# Patient Record
Sex: MALE | Race: BLACK OR AFRICAN AMERICAN | Employment: FULL TIME | URBAN - METROPOLITAN AREA
[De-identification: names, ages, dates, MRNs, and addresses within clinical notes are randomized per-mention and may not be internally consistent; named-entity substitution may affect disease eponyms.]

---

## 2017-06-14 ENCOUNTER — HOSPITAL ENCOUNTER (OUTPATIENT)
Dept: LAB | Age: 42
Discharge: HOME | End: 2017-06-14

## 2017-06-14 PROCEDURE — 99001 SPECIMEN HANDLING PT-LAB: CPT | Performed by: INTERNAL MEDICINE

## 2017-06-20 ENCOUNTER — OFFICE VISIT (OUTPATIENT)
Dept: INTERNAL MEDICINE CLINIC | Age: 42
End: 2017-06-20

## 2017-06-20 VITALS
HEIGHT: 76 IN | RESPIRATION RATE: 20 BRPM | BODY MASS INDEX: 35.92 KG/M2 | HEART RATE: 85 BPM | OXYGEN SATURATION: 97 % | DIASTOLIC BLOOD PRESSURE: 95 MMHG | TEMPERATURE: 98.7 F | WEIGHT: 295 LBS | SYSTOLIC BLOOD PRESSURE: 146 MMHG

## 2017-06-20 DIAGNOSIS — B36.0 TINEA VERSICOLOR: ICD-10-CM

## 2017-06-20 DIAGNOSIS — L21.9 SEBORRHEIC DERMATITIS: ICD-10-CM

## 2017-06-20 DIAGNOSIS — Z12.5 SCREENING PSA (PROSTATE SPECIFIC ANTIGEN): ICD-10-CM

## 2017-06-20 DIAGNOSIS — Z30.09 VASECTOMY EVALUATION: ICD-10-CM

## 2017-06-20 DIAGNOSIS — I10 ESSENTIAL HYPERTENSION: Primary | ICD-10-CM

## 2017-06-20 DIAGNOSIS — E66.09 NON MORBID OBESITY DUE TO EXCESS CALORIES: ICD-10-CM

## 2017-06-20 RX ORDER — KETOCONAZOLE 20 MG/ML
SHAMPOO TOPICAL
Qty: 120 ML | Refills: 2 | Status: SHIPPED | OUTPATIENT
Start: 2017-06-20 | End: 2020-11-04 | Stop reason: SDUPTHER

## 2017-06-20 RX ORDER — TRIAMCINOLONE ACETONIDE 1 MG/G
CREAM TOPICAL 2 TIMES DAILY
Qty: 30 G | Refills: 2 | Status: SHIPPED | OUTPATIENT
Start: 2017-06-20

## 2017-06-20 RX ORDER — PHENTERMINE HYDROCHLORIDE 37.5 MG/1
37.5 TABLET ORAL
Qty: 30 TAB | Refills: 0 | Status: SHIPPED | OUTPATIENT
Start: 2017-06-20 | End: 2017-12-20 | Stop reason: SDUPTHER

## 2017-06-20 NOTE — PATIENT INSTRUCTIONS
Advance Care Planning: Care Instructions  Your Care Instructions  It can be hard to live with an illness that cannot be cured. But if your health is getting worse, you may want to make decisions about end-of-life care. Planning for the end of your life does not mean that you are giving up. It is a way to make sure that your wishes are met. Clearly stating your wishes can make it easier for your loved ones. Making plans while you are still able may also ease your mind and make your final days less stressful and more meaningful. Follow-up care is a key part of your treatment and safety. Be sure to make and go to all appointments, and call your doctor if you are having problems. It's also a good idea to know your test results and keep a list of the medicines you take. What can you do to plan for the end of life? · You can bring these issues up with your doctor. You do not need to wait until your doctor starts the conversation. You might start with \"I would not be willing to live with . Roshni Hart \" When you complete this sentence it helps your doctor understand your wishes. · Talk openly and honestly with your doctor. This is the best way to understand the decisions you will need to make as your health changes. Know that you can always change your mind. · Ask your doctor about commonly used life-support measures. These include tube feedings, breathing machines, and fluids given through a vein (IV). Understanding these treatments will help you decide whether you want them. · You may choose to have these life-supporting treatments for a limited time. This allows a trial period to see whether they will help you. You may also decide that you want your doctor to take only certain measures to keep you alive. It is important to spell out these conditions so that your doctor and family understand them. · Talk to your doctor about how long you are likely to live.  He or she may be able to give you an idea of what usually happens with your specific illness. · Think about preparing papers that state your wishes. This way there will not be any confusion about what you want. You can change your instructions at any time. Which papers should you prepare? Advance directives are legal papers that tell doctors how you want to be cared for at the end of your life. You do not need a  to write these papers. Ask your doctor or your state health department for information on how to write your advance directives. They may have the forms for each of these types of papers. Make sure your doctor has a copy of these on file, and give a copy to a family member or close friend. · Consider a do-not-resuscitate order (DNR). This order asks that no extra treatments be done if your heart stops or you stop breathing. Extra treatments may include cardiopulmonary resuscitation (CPR), electrical shock to restart your heart, or a machine to breathe for you. If you decide to have a DNR order, ask your doctor to explain and write it. Place the order in your home where everyone can easily see it. · Consider a living will. A living will explains your wishes about life support and other treatments at the end of your life if you become unable to speak for yourself. Living garcia tell doctors to use or not use treatments that would keep you alive. You must have one or two witnesses or a notary present when you sign this form. · Consider a durable power of  for health care. This allows you to name a person to make decisions about your care if you are not able to. Most people ask a close friend or family member. Talk to this person about the kinds of treatments you want and those that you do not want. Make sure this person understands your wishes. These legal papers are simple to change. Tell your doctor what you want to change, and ask him or her to make a note in your medical file. Give your family updated copies of the papers.   Where can you learn more?  Go to http://omar-ricci.info/. Enter P184 in the search box to learn more about \"Advance Care Planning: Care Instructions. \"  Current as of: November 17, 2016  Content Version: 11.3  © 6474-9859 [employer]. Care instructions adapted under license by [employer] (which disclaims liability or warranty for this information). If you have questions about a medical condition or this instruction, always ask your healthcare professional. [address] any warranty or liability for your use of this information.

## 2017-06-20 NOTE — PROGRESS NOTES
Patient is in the office today for a referral request.  Patient would like a referral to a Urology for a Vasectomy. 1. Have you been to the ER, urgent care clinic since your last visit? Hospitalized since your last visit? No    2. Have you seen or consulted any other health care providers outside of the [address] [place] [address] since your last visit? Include any pap smears or colon screening.  No

## 2017-06-20 NOTE — MR AVS SNAPSHOT
Visit Information Date & Time Provider Department Dept. Phone Encounter #  
 6/20/2017  9:15 AM Lajean Halsted, MD [facility] [phone] Follow-up Instructions Return in about 6 months (around 12/20/2017) for labs 1 week before. Your Appointments 6/21/2017  8:30 AM  
ROUTINE CARE with Lajean Halsted, MD  
[facility] (--) Appt Note: 6 mo f/u  
 [address] [place] [address] [phone]  
  
   
 [address] [id number] Upcoming Health Maintenance Date Due DTaP/Tdap/Td series (1 - Tdap) 7/2/1994 INFLUENZA AGE 9 TO ADULT 8/1/2017 Allergies as of 6/20/2017  Review Complete On: 6/20/2017 By: Lina Arizmendi LPN No Known Allergies Current Immunizations  Reviewed on 12/28/2016 Name Date Influenza Vaccine 10/31/2016, 10/28/2015 Not reviewed this visit You Were Diagnosed With   
  
 Codes Comments Essential hypertension    -  Primary ICD-10-CM: I10 
ICD-9-CM: 401.9 Non morbid obesity due to excess calories     ICD-10-CM: E66.09 
ICD-9-CM: 278.00 Tinea versicolor     ICD-10-CM: B36.0 ICD-9-CM: 111.0 Seborrheic dermatitis     ICD-10-CM: L21.9 ICD-9-CM: 690.10 Vasectomy evaluation     ICD-10-CM: Z30.09 
ICD-9-CM: V25.09 Vitals BP Pulse Temp Resp Height(growth percentile) Weight(growth percentile) (!) 146/95 (BP 1 Location: Left arm, BP Patient Position: Sitting) 85 98.7 °F (37.1 °C) (Oral) 20 6' 3.5\" (1.918 m) 295 lb (133.8 kg) SpO2 BMI Smoking Status 97% 36.39 kg/m2 Never Smoker Vitals History BMI and BSA Data Body Mass Index Body Surface Area  
 36.39 kg/m 2 2.67 m 2 Preferred Pharmacy Pharmacy Name Phone [facility] [place] Villegas, [address] [facility] [phone] Your Updated Medication List  
  
   
 This list is accurate as of: 6/20/17  9:36 AM.  Always use your most recent med list. amLODIPine-benazepril 10-20 mg per capsule Commonly known as:  LOTREL  
take 1 capsule by mouth once daily FLUARIX QUAD [id number] (PF) Syrg injection Generic drug:  influenza vaccine 2016-17 (36mos+)(PF)  
inject 0.5 milliliter intramuscularly  
  
 ibuprofen 800 mg tablet Commonly known as:  MOTRIN Take 1 Tab by mouth every eight (8) hours as needed for Pain.  
  
 ketoconazole 2 % shampoo Commonly known as:  NIZORAL Apply quarter size daily as needed  
  
 phentermine 37.5 mg tablet Commonly known as:  ADIPEX-P Take 1 Tab by mouth every morning. Max Daily Amount: 37.5 mg.  
  
 triamcinolone acetonide 0.1 % topical cream  
Commonly known as:  KENALOG Apply  to affected area two (2) times a day. Apply dime size to affected area daily for 1 week as needed Prescriptions Printed Refills  
 phentermine (ADIPEX-P) 37.5 mg tablet 0 Sig: Take 1 Tab by mouth every morning. Max Daily Amount: 37.5 mg.  
 Class: Print Route: Oral  
 ketoconazole (NIZORAL) 2 % shampoo 2 Sig: Apply quarter size daily as needed Class: Print  
 triamcinolone acetonide (KENALOG) 0.1 % topical cream 2 Sig: Apply  to affected area two (2) times a day. Apply dime size to affected area daily for 1 week as needed Class: Print Route: Topical  
  
We Performed the Following REFERRAL TO UROLOGY [NQR902 Custom] Comments:  
 Refer to Dr Helene Treviño for vasectomy Follow-up Instructions Return in about 6 months (around 12/20/2017) for labs 1 week before. Referral Information Referral ID Referred By Referred To  
  
 [id number], [address] [facility] [address] [place] [address], [address] Phone: [phone] Fax: [phone] Visits Status Start Date End Date 1 New Request 6/20/17 6/20/18 If your referral has a status of pending review or denied, additional information will be sent to support the outcome of this decision. Patient Instructions Advance Care Planning: Care Instructions Your Care Instructions It can be hard to live with an illness that cannot be cured. But if your health is getting worse, you may want to make decisions about end-of-life care. Planning for the end of your life does not mean that you are giving up. It is a way to make sure that your wishes are met. Clearly stating your wishes can make it easier for your loved ones. Making plans while you are still able may also ease your mind and make your final days less stressful and more meaningful. Follow-up care is a key part of your treatment and safety. Be sure to make and go to all appointments, and call your doctor if you are having problems. It's also a good idea to know your test results and keep a list of the medicines you take. What can you do to plan for the end of life? · You can bring these issues up with your doctor. You do not need to wait until your doctor starts the conversation. You might start with \"I would not be willing to live with . Jia Rodriguez \" When you complete this sentence it helps your doctor understand your wishes. · Talk openly and honestly with your doctor. This is the best way to understand the decisions you will need to make as your health changes. Know that you can always change your mind. · Ask your doctor about commonly used life-support measures. These include tube feedings, breathing machines, and fluids given through a vein (IV). Understanding these treatments will help you decide whether you want them. · You may choose to have these life-supporting treatments for a limited time. This allows a trial period to see whether they will help you.  You may also decide that you want your doctor to take only certain measures to keep you alive. It is important to spell out these conditions so that your doctor and family understand them. · Talk to your doctor about how long you are likely to live. He or she may be able to give you an idea of what usually happens with your specific illness. · Think about preparing papers that state your wishes. This way there will not be any confusion about what you want. You can change your instructions at any time. Which papers should you prepare? Advance directives are legal papers that tell doctors how you want to be cared for at the end of your life. You do not need a  to write these papers. Ask your doctor or your [facility] department for information on how to write your advance directives. They may have the forms for each of these types of papers. Make sure your doctor has a copy of these on file, and give a copy to a family member or close friend. · Consider a do-not-resuscitate order (DNR). This order asks that no extra treatments be done if your heart stops or you stop breathing. Extra treatments may include cardiopulmonary resuscitation (CPR), electrical shock to restart your heart, or a machine to breathe for you. If you decide to have a DNR order, ask your doctor to explain and write it. Place the order in your home where everyone can easily see it. · Consider a living will. A living will explains your wishes about life support and other treatments at the end of your life if you become unable to speak for yourself. Living garcia tell doctors to use or not use treatments that would keep you alive. You must have one or two witnesses or a notary present when you sign this form. · Consider a durable power of  for health care. This allows you to name a person to make decisions about your care if you are not able to. Most people ask a close friend or family member. Talk to this person about the kinds of treatments you want and those that you do not want.  Make sure this person understands your wishes. These legal papers are simple to change. Tell your doctor what you want to change, and ask him or her to make a note in your medical file. Give your family updated copies of the papers. Where can you learn more? Go to http://omar-ricci.info/. Enter P184 in the search box to learn more about \"Advance Care Planning: Care Instructions. \" Current as of: November 17, 2016 Content Version: 11.3 © 7331-7969 [employer]. Care instructions adapted under license by [employer] (which disclaims liability or warranty for this information). If you have questions about a medical condition or this instruction, always ask your healthcare professional. [address] any warranty or liability for your use of this information. Introducing [facility]! Stephy So introduces [employer] patient portal. Now you can access parts of your medical record, email your doctor's office, and request medication refills online. 1. In your internet browser, go to [email or web address] 2. Click on the First Time User? Click Here link in the Sign In box. You will see the New Member Sign Up page. 3. Enter your [employer] Access Code exactly as it appears below. You will not need to use this code after youve completed the sign-up process. If you do not sign up before the expiration date, you must request a new code. · [employer] Access Code: [email or web address] Expires: 9/18/2017  9:23 AM 
 
4. Enter the last four digits of your Social Security Number (xxxx) and Date of Birth (mm/dd/yyyy) as indicated and click Submit. You will be taken to the next sign-up page. 5. Create a [employer] ID. This will be your [employer] login ID and cannot be changed, so think of one that is secure and easy to remember. 6. Create a [employer] password. You can change your password at any time. 7. Enter your Password Reset Question and Answer. This can be used at a later time if you forget your password. 8. Enter your e-mail address. You will receive e-mail notification when new information is available in [address] 9. Click Sign Up. You can now view and download portions of your medical record. 10. Click the Download Summary menu link to download a portable copy of your medical information. If you have questions, please visit the Frequently Asked Questions section of the [employer] website. Remember, [employer] is NOT to be used for urgent needs. For medical emergencies, dial 911. Now available from your iPhone and Android! Please provide this summary of care documentation to your next provider. Your primary care clinician is listed as KRISTIN ALBA. If you have any questions after today's visit, please call [phone].

## 2017-06-20 NOTE — PROGRESS NOTES
Sandy Elmore is a 35 y.o.  male and presents with Other (Referral to Urology for vasectomy); Hypertension (misses doses ); and Obesity      SUBJECTIVE:    Cardiovascular Review:  The patient has hypertension and obesity. Diet and Lifestyle: generally follows a low fat low cholesterol diet, exercises sporadically  Home BP Monitoring: is not measured at home. Pertinent ROS: not taking medications as instructed, no medication side effects noted, no TIA's, no chest pain on exertion, no dyspnea on exertion, noting swelling of ankles. Pt continues to struggle with losing weight. He has used Adipex in the past but he has not been able to keep the weight off. Contrave did not work for him. Pt would like to try Adipex again with diet and exercise. Respiratory ROS: negative for - shortness of breath  Cardiovascular ROS: negative for - chest pain    Current Outpatient Prescriptions   Medication Sig    phentermine (ADIPEX-P) 37.5 mg tablet Take 1 Tab by mouth every morning. Max Daily Amount: 37.5 mg.    ketoconazole (NIZORAL) 2 % shampoo Apply quarter size daily as needed    triamcinolone acetonide (KENALOG) 0.1 % topical cream Apply  to affected area two (2) times a day. Apply dime size to affected area daily for 1 week as needed    amLODIPine-benazepril (LOTREL) 10-20 mg per capsule take 1 capsule by mouth once daily    FLUARIX QUAD [id number], PF, syrg injection inject 0.5 milliliter intramuscularly    ibuprofen (MOTRIN) 800 mg tablet Take 1 Tab by mouth every eight (8) hours as needed for Pain. No current facility-administered medications for this visit.           OBJECTIVE:  alert, well appearing, and in no distress  Visit Vitals    BP (!) 146/95 (BP 1 Location: Left arm, BP Patient Position: Sitting)    Pulse 85    Temp 98.7 °F (37.1 °C) (Oral)    Resp 20    Ht 6' 3.5\" (1.918 m)    Wt 295 lb (133.8 kg)    SpO2 97%    BMI 36.39 kg/m2      well developed and well nourished  Chest - clear to auscultation, no wheezes, rales or rhonchi, symmetric air entry  Heart - normal rate, regular rhythm, normal S1, S2, no murmurs, rubs, clicks or gallops  Extremities - no edema     Labs: ordered     Discussed the patient's BMI with him. The BMI follow up plan is as follows: BMI is out of normal parameters and plan is as follows: I have counseled this patient on diet and exercise regimens. Assessment/Plan      ICD-10-CM ICD-9-CM    1. Essential hypertension I10 401.9 Uncontrolled due to poor compliance. Pt will try to be more compliant with Lotrel METABOLIC PANEL, COMPREHENSIVE   2. Non morbid obesity due to excess calories E66.09 278.00 Will continue phentermine (ADIPEX-P) 37.5 mg tablet along with diet and exercise. 3. Tinea versicolor B36.0 111.0 ketoconazole (NIZORAL) 2 % shampoo   4. Seborrheic dermatitis L21.9 690.10 triamcinolone acetonide (KENALOG) 0.1 % topical cream   5. Vasectomy evaluation Z30.09 V25.09 REFERRAL TO UROLOGY   6. Screening PSA (prostate specific antigen) Z12.5 V76.44 PROSTATE SPECIFIC AG (PSA)       Follow-up Disposition:  Return in about 6 months (around 12/20/2017) for labs 1 week before. Reviewed plan of care. Patient has provided input and agrees with goals.

## 2017-10-08 RX ORDER — AMLODIPINE AND BENAZEPRIL HYDROCHLORIDE 10; 20 MG/1; MG/1
CAPSULE ORAL
Qty: 30 CAP | Refills: 5 | Status: SHIPPED | OUTPATIENT
Start: 2017-10-08 | End: 2018-03-09 | Stop reason: SDUPTHER

## 2017-10-12 ENCOUNTER — TELEPHONE (OUTPATIENT)
Dept: FAMILY MEDICINE CLINIC | Age: 42
End: 2017-10-12
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
## 2017-12-15 ENCOUNTER — HOSPITAL ENCOUNTER (OUTPATIENT)
Dept: LAB | Age: 42
Discharge: HOME | End: 2017-12-15

## 2017-12-15 PROCEDURE — 99001 SPECIMEN HANDLING PT-LAB: CPT | Performed by: INTERNAL MEDICINE

## 2017-12-16 LAB
ALBUMIN SERPL-MCNC: 4.2 G/DL (ref 3.5–5.5)
ALBUMIN/GLOB SERPL: 1.4 {RATIO} (ref 1.2–2.2)
ALP SERPL-CCNC: 57 IU/L (ref 39–117)
ALT SERPL-CCNC: 27 IU/L (ref 0–44)
AST SERPL-CCNC: 17 IU/L (ref 0–40)
BILIRUB SERPL-MCNC: 0.4 MG/DL (ref 0–1.2)
BUN SERPL-MCNC: 17 MG/DL (ref 6–24)
BUN/CREAT SERPL: 18 (ref 9–20)
CALCIUM SERPL-MCNC: 9.3 MG/DL (ref 8.7–10.2)
CHLORIDE SERPL-SCNC: 104 MMOL/L (ref 96–106)
CO2 SERPL-SCNC: 23 MMOL/L (ref 18–29)
CREAT SERPL-MCNC: 0.97 MG/DL (ref 0.76–1.27)
GFR SERPLBLD CREATININE-BSD FMLA CKD-EPI: 109 ML/MIN/1.73
GFR SERPLBLD CREATININE-BSD FMLA CKD-EPI: 95 ML/MIN/1.73
GLOBULIN SER CALC-MCNC: 3 G/DL (ref 1.5–4.5)
GLUCOSE SERPL-MCNC: 91 MG/DL (ref 65–99)
POTASSIUM SERPL-SCNC: 4.4 MMOL/L (ref 3.5–5.2)
PROT SERPL-MCNC: 7.2 G/DL (ref 6–8.5)
PSA SERPL-MCNC: 0.6 NG/ML (ref 0–4)
SODIUM SERPL-SCNC: 143 MMOL/L (ref 134–144)

## 2017-12-18 DIAGNOSIS — I10 ESSENTIAL HYPERTENSION: ICD-10-CM

## 2017-12-20 ENCOUNTER — OFFICE VISIT (OUTPATIENT)
Dept: FAMILY MEDICINE CLINIC | Age: 42
End: 2017-12-20

## 2017-12-20 VITALS
SYSTOLIC BLOOD PRESSURE: 160 MMHG | BODY MASS INDEX: 35.56 KG/M2 | HEIGHT: 76 IN | OXYGEN SATURATION: 96 % | TEMPERATURE: 98.6 F | WEIGHT: 292 LBS | DIASTOLIC BLOOD PRESSURE: 89 MMHG | RESPIRATION RATE: 18 BRPM | HEART RATE: 91 BPM

## 2017-12-20 DIAGNOSIS — I10 ESSENTIAL HYPERTENSION: Primary | ICD-10-CM

## 2017-12-20 DIAGNOSIS — E66.09 NON MORBID OBESITY DUE TO EXCESS CALORIES: ICD-10-CM

## 2017-12-20 RX ORDER — PHENTERMINE HYDROCHLORIDE 37.5 MG/1
37.5 TABLET ORAL
Qty: 30 TAB | Refills: 0 | Status: SHIPPED | OUTPATIENT
Start: 2017-12-20 | End: 2018-06-01 | Stop reason: SDUPTHER

## 2017-12-20 NOTE — PROGRESS NOTES
Camille Orozco is a 38 y.o.  male and presents with Hypertension and Obesity      SUBJECTIVE:    Cardiovascular Review:  The patient has hypertension and obesity. Diet and Lifestyle: generally follows a low fat low cholesterol diet, exercises sporadically  Home BP Monitoring: is borderline at home. Pt will monitor on regular basis to see if it is controlled at home with him being compliant with his Lotrel     Pertinent ROS: taking medications as instructed, no medication side effects noted, no TIA's, no chest pain on exertion, no dyspnea on exertion, noting swelling of ankles. Pt continues to struggle with losing weight. He has used Adipex in the past but he has not been able to keep the weight off. Contrave did not work for him. Respiratory ROS: negative for - shortness of breath  Cardiovascular ROS: negative for - chest pain    Current Outpatient Prescriptions   Medication Sig    phentermine (ADIPEX-P) 37.5 mg tablet Take 1 Tab by mouth every morning. Max Daily Amount: 37.5 mg.    amLODIPine-benazepril (LOTREL) 10-20 mg per capsule take 1 capsule by mouth once daily    amLODIPine-benazepril (LOTREL) 10-20 mg per capsule     diazePAM (VALIUM) 2 mg tablet Take 2.5 Tabs by mouth every six (6) hours as needed for Anxiety. Max Daily Amount: 20 mg.    oxyCODONE-acetaminophen (PERCOCET) 5-325 mg per tablet Take 1 Tab by mouth every four (4) hours as needed for Pain. Max Daily Amount: 6 Tabs.  ketoconazole (NIZORAL) 2 % shampoo Apply quarter size daily as needed    triamcinolone acetonide (KENALOG) 0.1 % topical cream Apply  to affected area two (2) times a day. Apply dime size to affected area daily for 1 week as needed    FLUARIX QUAD [id number], PF, syrg injection inject 0.5 milliliter intramuscularly    ibuprofen (MOTRIN) 800 mg tablet Take 1 Tab by mouth every eight (8) hours as needed for Pain. No current facility-administered medications for this visit.           OBJECTIVE:  alert, well appearing, and in no distress  Visit Vitals    /89 (BP 1 Location: Left arm, BP Patient Position: Sitting)    Pulse 91    Temp 98.6 °F (37 °C) (Oral)    Resp 18    Ht 6' 4\" (1.93 m)    Wt 292 lb (132.5 kg)    SpO2 96%    BMI 35.54 kg/m2      well developed and well nourished  Chest - clear to auscultation, no wheezes, rales or rhonchi, symmetric air entry  Heart - normal rate, regular rhythm, normal S1, S2, no murmurs, rubs, clicks or gallops  Musculoskeletal - abnormal exam of left lateral ankle with pinpoint tenderness and swelling as well as worsening pain with eversion of the left foot   Extremities - no edema     Labs:   Lab Results   Component Value Date/Time    Cholesterol, total 194 06/14/2017 08:27 AM    HDL Cholesterol 45 06/14/2017 08:27 AM    LDL, calculated 123 06/14/2017 08:27 AM    Triglyceride 130 06/14/2017 08:27 AM     Lab Results   Component Value Date/Time    Prostate Specific Ag 0.6 12/15/2017 08:24 AM    Prostate Specific Ag 0.6 07/07/2016 09:23 AM    Prostate Specific Ag 0.5 12/31/2015 10:16 AM     Lab Results   Component Value Date/Time    Sodium 143 12/15/2017 08:24 AM    Potassium 4.4 12/15/2017 08:24 AM    Chloride 104 12/15/2017 08:24 AM    CO2 23 12/15/2017 08:24 AM    Glucose 91 12/15/2017 08:24 AM    BUN 17 12/15/2017 08:24 AM    Creatinine 0.97 12/15/2017 08:24 AM    BUN/Creatinine ratio 18 12/15/2017 08:24 AM    GFR est  12/15/2017 08:24 AM    GFR est non-AA 95 12/15/2017 08:24 AM    Calcium 9.3 12/15/2017 08:24 AM    Bilirubin, total 0.4 12/15/2017 08:24 AM    ALT (SGPT) 27 12/15/2017 08:24 AM    AST (SGOT) 17 12/15/2017 08:24 AM    Alk. phosphatase 57 12/15/2017 08:24 AM    Protein, total 7.2 12/15/2017 08:24 AM    Albumin 4.2 12/15/2017 08:24 AM    A-G Ratio 1.4 12/15/2017 08:24 AM          Discussed the patient's BMI with him.   The BMI follow up plan is as follows: BMI is out of normal parameters and plan is as follows: I have counseled this patient on diet and exercise regimens. Assessment/Plan      ICD-10-CM ICD-9-CM    1. Essential hypertension I10 401.9 Borderline controlled in this office. pt will monitor at home and be compliant with Lotrel METABOLIC PANEL, COMPREHENSIVE   2. Non morbid obesity due to excess calories E66.09 278.00 Pt will try to exercise and watch diet along with phentermine (ADIPEX-P) 37.5 mg tablet       Follow-up Disposition:  Return in about 6 months (around 6/20/2018) for labs 1 week before. Reviewed plan of care. Patient has provided input and agrees with goals.

## 2017-12-20 NOTE — PATIENT INSTRUCTIONS
High Blood Pressure: Care Instructions  Your Care Instructions    If your blood pressure is usually above 140/90, you have high blood pressure, or hypertension. That means the top number is 140 or higher or the bottom number is 90 or higher, or both. Despite what a lot of people think, high blood pressure usually doesn't cause headaches or make you feel dizzy or lightheaded. It usually has no symptoms. But it does increase your risk for heart attack, stroke, and kidney or eye damage. The higher your blood pressure, the more your risk increases. Your doctor will give you a goal for your blood pressure. Your goal will be based on your health and your age. An example of a goal is to keep your blood pressure below 140/90. Lifestyle changes, such as eating healthy and being active, are always important to help lower blood pressure. You might also take medicine to reach your blood pressure goal.  Follow-up care is a key part of your treatment and safety. Be sure to make and go to all appointments, and call your doctor if you are having problems. It's also a good idea to know your test results and keep a list of the medicines you take. How can you care for yourself at home? Medical treatment  · If you stop taking your medicine, your blood pressure will go back up. You may take one or more types of medicine to lower your blood pressure. Be safe with medicines. Take your medicine exactly as prescribed. Call your doctor if you think you are having a problem with your medicine. · Talk to your doctor before you start taking aspirin every day. Aspirin can help certain people lower their risk of a heart attack or stroke. But taking aspirin isn't right for everyone, because it can cause serious bleeding. · See your doctor regularly. You may need to see the doctor more often at first or until your blood pressure comes down.   · If you are taking blood pressure medicine, talk to your doctor before you take decongestants or anti-inflammatory medicine, such as ibuprofen. Some of these medicines can raise blood pressure. · Learn how to check your blood pressure at home. Lifestyle changes  · Stay at a healthy weight. This is especially important if you put on weight around the waist. Losing even 10 pounds can help you lower your blood pressure. · If your doctor recommends it, get more exercise. Walking is a good choice. Bit by bit, increase the amount you walk every day. Try for at least 30 minutes on most days of the week. You also may want to swim, bike, or do other activities. · Avoid or limit alcohol. Talk to your doctor about whether you can drink any alcohol. · Try to limit how much sodium you eat to less than 2,300 milligrams (mg) a day. Your doctor may ask you to try to eat less than 1,500 mg a day. · Eat plenty of fruits (such as bananas and oranges), vegetables, legumes, whole grains, and low-fat dairy products. · Lower the amount of saturated fat in your diet. Saturated fat is found in animal products such as milk, cheese, and meat. Limiting these foods may help you lose weight and also lower your risk for heart disease. · Do not smoke. Smoking increases your risk for heart attack and stroke. If you need help quitting, talk to your doctor about stop-smoking programs and medicines. These can increase your chances of quitting for good. When should you call for help? Call 911 anytime you think you may need emergency care. This may mean having symptoms that suggest that your blood pressure is causing a serious heart or blood vessel problem. Your blood pressure may be over 180/110. ? For example, call 911 if:  ? · You have symptoms of a heart attack. These may include:  ¨ Chest pain or pressure, or a strange feeling in the chest.  ¨ Sweating. ¨ Shortness of breath. ¨ Nausea or vomiting.   ¨ Pain, pressure, or a strange feeling in the back, neck, jaw, or upper belly or in one or both shoulders or arms.  ¨ Lightheadedness or sudden weakness. ¨ A fast or irregular heartbeat. ? · You have symptoms of a stroke. These may include:  ¨ Sudden numbness, tingling, weakness, or loss of movement in your face, arm, or leg, especially on only one side of your body. ¨ Sudden vision changes. ¨ Sudden trouble speaking. ¨ Sudden confusion or trouble understanding simple statements. ¨ Sudden problems with walking or balance. ¨ A sudden, severe headache that is different from past headaches. ? · You have severe back or belly pain. ?Do not wait until your blood pressure comes down on its own. Get help right away. ?Call your doctor now or seek immediate care if:  ? · Your blood pressure is much higher than normal (such as 180/110 or higher), but you don't have symptoms. ? · You think high blood pressure is causing symptoms, such as:  ¨ Severe headache. ¨ Blurry vision. ? Watch closely for changes in your health, and be sure to contact your doctor if:  ? · Your blood pressure measures 140/90 or higher at least 2 times. That means the top number is 140 or higher or the bottom number is 90 or higher, or both. ? · You think you may be having side effects from your blood pressure medicine. ? · Your blood pressure is usually normal, but it goes above normal at least 2 times. Where can you learn more? Go to http://omar-ricci.info/. Enter C656 in the search box to learn more about \"High Blood Pressure: Care Instructions. \"  Current as of: September 21, 2016  Content Version: 11.4  © 4579-0646 [employer]. Care instructions adapted under license by [employer] (which disclaims liability or warranty for this information). If you have questions about a medical condition or this instruction, always ask your healthcare professional. [place] [address] any warranty or liability for your use of this information.        DASH Diet: Care Instructions  Your Care Instructions    The DASH diet is an eating plan that can help lower your blood pressure. DASH stands for Dietary Approaches to Stop Hypertension. Hypertension is high blood pressure. The DASH diet focuses on eating foods that are high in calcium, potassium, and magnesium. These nutrients can lower blood pressure. The foods that are highest in these nutrients are fruits, vegetables, low-fat dairy products, nuts, seeds, and legumes. But taking calcium, potassium, and magnesium supplements instead of eating foods that are high in those nutrients does not have the same effect. The DASH diet also includes whole grains, fish, and poultry. The DASH diet is one of several lifestyle changes your doctor may recommend to lower your high blood pressure. Your doctor may also want you to decrease the amount of sodium in your diet. Lowering sodium while following the DASH diet can lower blood pressure even further than just the DASH diet alone. Follow-up care is a key part of your treatment and safety. Be sure to make and go to all appointments, and call your doctor if you are having problems. It's also a good idea to know your test results and keep a list of the medicines you take. How can you care for yourself at home? Following the DASH diet  · Eat 4 to 5 servings of fruit each day. A serving is 1 medium-sized piece of fruit, ½ cup chopped or canned fruit, 1/4 cup dried fruit, or 4 ounces (½ cup) of fruit juice. Choose fruit more often than fruit juice. · Eat 4 to 5 servings of vegetables each day. A serving is 1 cup of lettuce or raw leafy vegetables, ½ cup of chopped or cooked vegetables, or 4 ounces (½ cup) of vegetable juice. Choose vegetables more often than vegetable juice. · Get 2 to 3 servings of low-fat and fat-free dairy each day. A serving is 8 ounces of milk, 1 cup of yogurt, or 1 ½ ounces of cheese. · Eat 6 to 8 servings of grains each day.  A serving is 1 slice of bread, 1 ounce of dry cereal, or ½ cup of cooked rice, pasta, or cooked cereal. Try to choose whole-grain products as much as possible. · Limit lean meat, poultry, and fish to 2 servings each day. A serving is 3 ounces, about the size of a deck of cards. · Eat 4 to 5 servings of nuts, seeds, and legumes (cooked dried beans, lentils, and split peas) each week. A serving is 1/3 cup of nuts, 2 tablespoons of seeds, or ½ cup of cooked beans or peas. · Limit fats and oils to 2 to 3 servings each day. A serving is 1 teaspoon of vegetable oil or 2 tablespoons of salad dressing. · Limit sweets and added sugars to 5 servings or less a week. A serving is 1 tablespoon jelly or jam, ½ cup sorbet, or 1 cup of lemonade. · Eat less than 2,300 milligrams (mg) of sodium a day. If you limit your sodium to 1,500 mg a day, you can lower your blood pressure even more. Tips for success  · Start small. Do not try to make dramatic changes to your diet all at once. You might feel that you are missing out on your favorite foods and then be more likely to not follow the plan. Make small changes, and stick with them. Once those changes become habit, add a few more changes. · Try some of the following:  ¨ Make it a goal to eat a fruit or vegetable at every meal and at snacks. This will make it easy to get the recommended amount of fruits and vegetables each day. ¨ Try yogurt topped with fruit and nuts for a snack or healthy dessert. ¨ Add lettuce, tomato, cucumber, and onion to sandwiches. ¨ Combine a ready-made pizza crust with low-fat mozzarella cheese and lots of vegetable toppings. Try using tomatoes, squash, spinach, broccoli, carrots, cauliflower, and onions. ¨ Have a variety of cut-up vegetables with a low-fat dip as an appetizer instead of chips and dip. ¨ Sprinkle sunflower seeds or chopped almonds over salads. Or try adding chopped walnuts or almonds to cooked vegetables. ¨ Try some vegetarian meals using beans and peas. Add garbanzo or kidney beans to salads. Make burritos and tacos with mashed carney beans or black beans. Where can you learn more? Go to http://omar-ricci.info/. Enter D336 in the search box to learn more about \"DASH Diet: Care Instructions. \"  Current as of: September 21, 2016  Content Version: 11.4  © 7572-8885 [employer]. Care instructions adapted under license by [employer] (which disclaims liability or warranty for this information). If you have questions about a medical condition or this instruction, always ask your healthcare professional. [place] [address] any warranty or liability for your use of this information.

## 2017-12-20 NOTE — PROGRESS NOTES
Joseph Vásquez is a  38 y.o. male presents today for office visit for routine follow up. 1. Have you been to the ER, urgent care clinic or hospitalized since your last visit? NO      2. Have you seen or consulted any other health care providers outside of the [address] [place] [address] since your last visit (Include any pap smears or colon screening)?  NO

## 2017-12-20 NOTE — MR AVS SNAPSHOT
Visit Information Date & Time Provider Department Dept. Phone Encounter #  
 12/20/2017  9:15 AM Corey Ashley, [address] [place] [address] [place] [address] [facility] [id number] Follow-up Instructions Return in about 6 months (around 6/20/2018) for labs 1 week before. Upcoming Health Maintenance Date Due DTaP/Tdap/Td series (1 - Tdap) 7/2/1994 Influenza Age 5 to Adult 8/1/2017 Allergies as of 12/20/2017  Review Complete On: 12/20/2017 By: Corey Ashley MD  
 No Known Allergies Current Immunizations  Reviewed on 12/28/2016 Name Date Influenza Vaccine 10/31/2016, 10/28/2015 Not reviewed this visit You Were Diagnosed With   
  
 Codes Comments Essential hypertension    -  Primary ICD-10-CM: I10 
ICD-9-CM: 401.9 Non morbid obesity due to excess calories     ICD-10-CM: E66.09 
ICD-9-CM: 278.00 Vitals BP Pulse Temp Resp Height(growth percentile) Weight(growth percentile) 160/89 (BP 1 Location: Left arm, BP Patient Position: Sitting) 91 98.6 °F (37 °C) (Oral) 18 6' 4\" (1.93 m) 292 lb (132.5 kg) SpO2 BMI Smoking Status 96% 35.54 kg/m2 Never Smoker BMI and BSA Data Body Mass Index Body Surface Area 35.54 kg/m 2 2.67 m 2 Preferred Pharmacy Pharmacy Name Phone [id number] [address] [phone] Your Updated Medication List  
  
   
This list is accurate as of: 12/20/17 10:11 AM.  Always use your most recent med list.  
  
  
  
  
 * amLODIPine-benazepril 10-20 mg per capsule Commonly known as:  Maria C Hausen * amLODIPine-benazepril 10-20 mg per capsule Commonly known as:  LOTREL  
take 1 capsule by mouth once daily  
  
 diazePAM 2 mg tablet Commonly known as:  VALIUM Take 2.5 Tabs by mouth every six (6) hours as needed for Anxiety. Max Daily Amount: 20 mg. FLUARIX QUAD [id number] (PF) Syrg injection Generic drug:  influenza vaccine 2016-17 (36mos+)(PF)  
inject 0.5 milliliter intramuscularly  
  
 ibuprofen 800 mg tablet Commonly known as:  MOTRIN Take 1 Tab by mouth every eight (8) hours as needed for Pain.  
  
 ketoconazole 2 % shampoo Commonly known as:  NIZORAL Apply quarter size daily as needed  
  
 oxyCODONE-acetaminophen 5-325 mg per tablet Commonly known as:  PERCOCET Take 1 Tab by mouth every four (4) hours as needed for Pain. Max Daily Amount: 6 Tabs. phentermine 37.5 mg tablet Commonly known as:  ADIPEX-P Take 1 Tab by mouth every morning. Max Daily Amount: 37.5 mg.  
  
 triamcinolone acetonide 0.1 % topical cream  
Commonly known as:  KENALOG Apply  to affected area two (2) times a day. Apply dime size to affected area daily for 1 week as needed * Notice: This list has 2 medication(s) that are the same as other medications prescribed for you. Read the directions carefully, and ask your doctor or other care provider to review them with you. Prescriptions Printed Refills  
 phentermine (ADIPEX-P) 37.5 mg tablet 0 Sig: Take 1 Tab by mouth every morning. Max Daily Amount: 37.5 mg.  
 Class: Print Route: Oral  
  
Follow-up Instructions Return in about 6 months (around 6/20/2018) for labs 1 week before. Patient Instructions High Blood Pressure: Care Instructions Your Care Instructions If your blood pressure is usually above 140/90, you have high blood pressure, or hypertension. That means the top number is 140 or higher or the bottom number is 90 or higher, or both. Despite what a lot of people think, high blood pressure usually doesn't cause headaches or make you feel dizzy or lightheaded. It usually has no symptoms. But it does increase your risk for heart attack, stroke, and kidney or eye damage. The higher your blood pressure, the more your risk increases. Your doctor will give you a goal for your blood pressure. Your goal will be based on your health and your age. An example of a goal is to keep your blood pressure below 140/90. Lifestyle changes, such as eating healthy and being active, are always important to help lower blood pressure. You might also take medicine to reach your blood pressure goal. 
Follow-up care is a key part of your treatment and safety. Be sure to make and go to all appointments, and call your doctor if you are having problems. It's also a good idea to know your test results and keep a list of the medicines you take. How can you care for yourself at home? Medical treatment · If you stop taking your medicine, your blood pressure will go back up. You may take one or more types of medicine to lower your blood pressure. Be safe with medicines. Take your medicine exactly as prescribed. Call your doctor if you think you are having a problem with your medicine. · Talk to your doctor before you start taking aspirin every day. Aspirin can help certain people lower their risk of a heart attack or stroke. But taking aspirin isn't right for everyone, because it can cause serious bleeding. · See your doctor regularly. You may need to see the doctor more often at first or until your blood pressure comes down. · If you are taking blood pressure medicine, talk to your doctor before you take decongestants or anti-inflammatory medicine, such as ibuprofen. Some of these medicines can raise blood pressure. · Learn how to check your blood pressure at home. Lifestyle changes · Stay at a healthy weight. This is especially important if you put on weight around the waist. Losing even 10 pounds can help you lower your blood pressure. · If your doctor recommends it, get more exercise. Walking is a good choice. Bit by bit, increase the amount you walk every day. Try for at least 30 minutes on most days of the week.  You also may want to swim, bike, or do other activities. · Avoid or limit alcohol. Talk to your doctor about whether you can drink any alcohol. · Try to limit how much sodium you eat to less than 2,300 milligrams (mg) a day. Your doctor may ask you to try to eat less than 1,500 mg a day. · Eat plenty of fruits (such as bananas and oranges), vegetables, legumes, whole grains, and low-fat dairy products. · Lower the amount of saturated fat in your diet. Saturated fat is found in animal products such as milk, cheese, and meat. Limiting these foods may help you lose weight and also lower your risk for heart disease. · Do not smoke. Smoking increases your risk for heart attack and stroke. If you need help quitting, talk to your doctor about stop-smoking programs and medicines. These can increase your chances of quitting for good. When should you call for help? Call 911 anytime you think you may need emergency care. This may mean having symptoms that suggest that your blood pressure is causing a serious heart or blood vessel problem. Your blood pressure may be over 180/110. ? For example, call 911 if: 
? · You have symptoms of a heart attack. These may include: ¨ Chest pain or pressure, or a strange feeling in the chest. 
¨ Sweating. ¨ Shortness of breath. ¨ Nausea or vomiting. ¨ Pain, pressure, or a strange feeling in the back, neck, jaw, or upper belly or in one or both shoulders or arms. ¨ Lightheadedness or sudden weakness. ¨ A fast or irregular heartbeat. ? · You have symptoms of a stroke. These may include: 
¨ Sudden numbness, tingling, weakness, or loss of movement in your face, arm, or leg, especially on only one side of your body. ¨ Sudden vision changes. ¨ Sudden trouble speaking. ¨ Sudden confusion or trouble understanding simple statements. ¨ Sudden problems with walking or balance. ¨ A sudden, severe headache that is different from past headaches. ? · You have severe back or belly pain. ?Do not wait until your blood pressure comes down on its own. Get help right away. ?Call your doctor now or seek immediate care if: 
? · Your blood pressure is much higher than normal (such as 180/110 or higher), but you don't have symptoms. ? · You think high blood pressure is causing symptoms, such as: ¨ Severe headache. ¨ Blurry vision. ? Watch closely for changes in your health, and be sure to contact your doctor if: 
? · Your blood pressure measures 140/90 or higher at least 2 times. That means the top number is 140 or higher or the bottom number is 90 or higher, or both. ? · You think you may be having side effects from your blood pressure medicine. ? · Your blood pressure is usually normal, but it goes above normal at least 2 times. Where can you learn more? Go to http://omar-ricci.info/. Enter X105 in the search box to learn more about \"High Blood Pressure: Care Instructions. \" Current as of: September 21, 2016 Content Version: 11.4 © 5210-1166 [employer]. Care instructions adapted under license by [employer] (which disclaims liability or warranty for this information). If you have questions about a medical condition or this instruction, always ask your healthcare professional. [address] any warranty or liability for your use of this information. DASH Diet: Care Instructions Your Care Instructions The DASH diet is an eating plan that can help lower your blood pressure. DASH stands for Dietary Approaches to Stop Hypertension. Hypertension is high blood pressure. The DASH diet focuses on eating foods that are high in calcium, potassium, and magnesium. These nutrients can lower blood pressure. The foods that are highest in these nutrients are fruits, vegetables, low-fat dairy products, nuts, seeds, and legumes.  But taking calcium, potassium, and magnesium supplements instead of eating foods that are high in those nutrients does not have the same effect. The DASH diet also includes whole grains, fish, and poultry. The DASH diet is one of several lifestyle changes your doctor may recommend to lower your high blood pressure. Your doctor may also want you to decrease the amount of sodium in your diet. Lowering sodium while following the DASH diet can lower blood pressure even further than just the DASH diet alone. Follow-up care is a key part of your treatment and safety. Be sure to make and go to all appointments, and call your doctor if you are having problems. It's also a good idea to know your test results and keep a list of the medicines you take. How can you care for yourself at home? Following the DASH diet · Eat 4 to 5 servings of fruit each day. A serving is 1 medium-sized piece of fruit, ½ cup chopped or canned fruit, 1/4 cup dried fruit, or 4 ounces (½ cup) of fruit juice. Choose fruit more often than fruit juice. · Eat 4 to 5 servings of vegetables each day. A serving is 1 cup of lettuce or raw leafy vegetables, ½ cup of chopped or cooked vegetables, or 4 ounces (½ cup) of vegetable juice. Choose vegetables more often than vegetable juice. · Get 2 to 3 servings of low-fat and fat-free dairy each day. A serving is 8 ounces of milk, 1 cup of yogurt, or 1 ½ ounces of cheese. · Eat 6 to 8 servings of grains each day. A serving is 1 slice of bread, 1 ounce of dry cereal, or ½ cup of cooked rice, pasta, or cooked cereal. Try to choose whole-grain products as much as possible. · Limit lean meat, poultry, and fish to 2 servings each day. A serving is 3 ounces, about the size of a deck of cards. · Eat 4 to 5 servings of nuts, seeds, and legumes (cooked dried beans, lentils, and split peas) each week. A serving is 1/3 cup of nuts, 2 tablespoons of seeds, or ½ cup of cooked beans or peas. · Limit fats and oils to 2 to 3 servings each day. A serving is 1 teaspoon of vegetable oil or 2 tablespoons of salad dressing. · Limit sweets and added sugars to 5 servings or less a week. A serving is 1 tablespoon jelly or jam, ½ cup sorbet, or 1 cup of lemonade. · Eat less than 2,300 milligrams (mg) of sodium a day. If you limit your sodium to 1,500 mg a day, you can lower your blood pressure even more. Tips for success · Start small. Do not try to make dramatic changes to your diet all at once. You might feel that you are missing out on your favorite foods and then be more likely to not follow the plan. Make small changes, and stick with them. Once those changes become habit, add a few more changes. · Try some of the following: ¨ Make it a goal to eat a fruit or vegetable at every meal and at snacks. This will make it easy to get the recommended amount of fruits and vegetables each day. ¨ Try yogurt topped with fruit and nuts for a snack or healthy dessert. ¨ Add lettuce, tomato, cucumber, and onion to sandwiches. ¨ Combine a ready-made pizza crust with low-fat mozzarella cheese and lots of vegetable toppings. Try using tomatoes, squash, spinach, broccoli, carrots, cauliflower, and onions. ¨ Have a variety of cut-up vegetables with a low-fat dip as an appetizer instead of chips and dip. ¨ Sprinkle sunflower seeds or chopped almonds over salads. Or try adding chopped walnuts or almonds to cooked vegetables. ¨ Try some vegetarian meals using beans and peas. Add garbanzo or kidney beans to salads. Make burritos and tacos with mashed carney beans or black beans. Where can you learn more? Go to http://omar-ricci.info/. Enter F374 in the search box to learn more about \"DASH Diet: Care Instructions. \" Current as of: September 21, 2016 Content Version: 11.4 © 9460-9657 [employer]. Care instructions adapted under license by [employer] (which disclaims liability or warranty for this information).  If you have questions about a medical condition or this instruction, always ask your healthcare professional. [place] [address] any warranty or liability for your use of this information. Introducing [facility]! [employer] introduces [employer] patient portal. Now you can access parts of your medical record, email your doctor's office, and request medication refills online. 1. In your internet browser, go to [email or web address]. 2. Click on the First Time User? Click Here link in the Sign In box. You will see the New Member Sign Up page. 3. Enter your [employer] Access Code exactly as it appears below. You will not need to use this code after youve completed the sign-up process. If you do not sign up before the expiration date, you must request a new code. · [employer] Access Code: [email or web address] Expires: 3/20/2018  9:16 AM 
 
4. Enter the last four digits of your Social Security Number (xxxx) and Date of Birth (mm/dd/yyyy) as indicated and click Submit. You will be taken to the next sign-up page. 5. Create a [employer] ID. This will be your [employer] login ID and cannot be changed, so think of one that is secure and easy to remember. 6. Create a [employer] password. You can change your password at any time. 7. Enter your Password Reset Question and Answer. This can be used at a later time if you forget your password. 8. Enter your e-mail address. You will receive e-mail notification when new information is available in [address] 9. Click Sign Up. You can now view and download portions of your medical record. 10. Click the Download Summary menu link to download a portable copy of your medical information. If you have questions, please visit the Frequently Asked Questions section of the [employer] website. Remember, [employer] is NOT to be used for urgent needs. For medical emergencies, dial 911. Now available from your iPhone and Android! Please provide this summary of care documentation to your next provider. Your primary care clinician is listed as KRISTIN ALBA. If you have any questions after today's visit, please call [phone].

## 2018-01-15 DIAGNOSIS — L21.9 SEBORRHEIC DERMATITIS: ICD-10-CM

## 2018-01-15 RX ORDER — TRIAMCINOLONE ACETONIDE 1 MG/G
CREAM TOPICAL
Qty: 30 G | Refills: 2 | Status: SHIPPED | OUTPATIENT
Start: 2018-01-15 | End: 2018-06-01 | Stop reason: SDUPTHER

## 2018-03-09 RX ORDER — AMLODIPINE AND BENAZEPRIL HYDROCHLORIDE 10; 20 MG/1; MG/1
CAPSULE ORAL
Qty: 30 CAP | Refills: 5 | Status: SHIPPED | OUTPATIENT
Start: 2018-03-09 | End: 2018-05-22 | Stop reason: SDUPTHER

## 2018-05-22 RX ORDER — AMLODIPINE AND BENAZEPRIL HYDROCHLORIDE 10; 20 MG/1; MG/1
CAPSULE ORAL
Qty: 90 CAP | Refills: 2 | Status: SHIPPED | OUTPATIENT
Start: 2018-05-22 | End: 2018-06-01 | Stop reason: ALTCHOICE

## 2018-05-31 ENCOUNTER — HOSPITAL ENCOUNTER (OUTPATIENT)
Dept: LAB | Age: 43
Discharge: HOME | End: 2018-05-31

## 2018-05-31 PROCEDURE — 99001 SPECIMEN HANDLING PT-LAB: CPT | Performed by: INTERNAL MEDICINE

## 2018-06-01 ENCOUNTER — OFFICE VISIT (OUTPATIENT)
Dept: FAMILY MEDICINE CLINIC | Age: 43
End: 2018-06-01

## 2018-06-01 VITALS
HEART RATE: 89 BPM | TEMPERATURE: 98.2 F | WEIGHT: 298 LBS | RESPIRATION RATE: 20 BRPM | HEIGHT: 76 IN | DIASTOLIC BLOOD PRESSURE: 97 MMHG | OXYGEN SATURATION: 95 % | SYSTOLIC BLOOD PRESSURE: 148 MMHG | BODY MASS INDEX: 36.29 KG/M2

## 2018-06-01 DIAGNOSIS — L21.9 SEBORRHEIC DERMATITIS: ICD-10-CM

## 2018-06-01 DIAGNOSIS — N50.89 TESTICLE SWELLING: ICD-10-CM

## 2018-06-01 DIAGNOSIS — Z12.5 SCREENING PSA (PROSTATE SPECIFIC ANTIGEN): ICD-10-CM

## 2018-06-01 DIAGNOSIS — I10 ESSENTIAL HYPERTENSION: Primary | ICD-10-CM

## 2018-06-01 DIAGNOSIS — E66.01 CLASS 2 SEVERE OBESITY DUE TO EXCESS CALORIES WITH SERIOUS COMORBIDITY AND BODY MASS INDEX (BMI) OF 36.0 TO 36.9 IN ADULT (HCC): ICD-10-CM

## 2018-06-01 RX ORDER — AMLODIPINE AND BENAZEPRIL HYDROCHLORIDE 10; 20 MG/1; MG/1
CAPSULE ORAL
COMMUNITY
End: 2018-06-01 | Stop reason: ALTCHOICE

## 2018-06-01 RX ORDER — AMLODIPINE AND VALSARTAN 10; 320 MG/1; MG/1
1 TABLET ORAL DAILY
Qty: 90 TAB | Refills: 2 | Status: SHIPPED | OUTPATIENT
Start: 2018-06-01 | End: 2019-03-18 | Stop reason: SDUPTHER

## 2018-06-01 RX ORDER — IBUPROFEN 800 MG/1
800 TABLET ORAL
COMMUNITY
Start: 2010-10-22 | End: 2021-06-21

## 2018-06-01 RX ORDER — TRIAMCINOLONE ACETONIDE 1 MG/G
CREAM TOPICAL
Qty: 30 G | Refills: 2 | Status: SHIPPED | OUTPATIENT
Start: 2018-06-01 | End: 2019-11-08 | Stop reason: SDUPTHER

## 2018-06-01 RX ORDER — PHENTERMINE HYDROCHLORIDE 37.5 MG/1
37.5 TABLET ORAL
Qty: 30 TAB | Refills: 0 | Status: SHIPPED | OUTPATIENT
Start: 2018-06-01 | End: 2020-03-09 | Stop reason: SDUPTHER

## 2018-06-01 NOTE — PATIENT INSTRUCTIONS
High Blood Pressure: Care Instructions  Your Care Instructions    If your blood pressure is usually above 140/90, you have high blood pressure, or hypertension. That means the top number is 140 or higher or the bottom number is 90 or higher, or both. Despite what a lot of people think, high blood pressure usually doesn't cause headaches or make you feel dizzy or lightheaded. It usually has no symptoms. But it does increase your risk for heart attack, stroke, and kidney or eye damage. The higher your blood pressure, the more your risk increases. Your doctor will give you a goal for your blood pressure. Your goal will be based on your health and your age. An example of a goal is to keep your blood pressure below 140/90. Lifestyle changes, such as eating healthy and being active, are always important to help lower blood pressure. You might also take medicine to reach your blood pressure goal.  Follow-up care is a key part of your treatment and safety. Be sure to make and go to all appointments, and call your doctor if you are having problems. It's also a good idea to know your test results and keep a list of the medicines you take. How can you care for yourself at home? Medical treatment  · If you stop taking your medicine, your blood pressure will go back up. You may take one or more types of medicine to lower your blood pressure. Be safe with medicines. Take your medicine exactly as prescribed. Call your doctor if you think you are having a problem with your medicine. · Talk to your doctor before you start taking aspirin every day. Aspirin can help certain people lower their risk of a heart attack or stroke. But taking aspirin isn't right for everyone, because it can cause serious bleeding. · See your doctor regularly. You may need to see the doctor more often at first or until your blood pressure comes down.   · If you are taking blood pressure medicine, talk to your doctor before you take decongestants or anti-inflammatory medicine, such as ibuprofen. Some of these medicines can raise blood pressure. · Learn how to check your blood pressure at home. Lifestyle changes  · Stay at a healthy weight. This is especially important if you put on weight around the waist. Losing even 10 pounds can help you lower your blood pressure. · If your doctor recommends it, get more exercise. Walking is a good choice. Bit by bit, increase the amount you walk every day. Try for at least 30 minutes on most days of the week. You also may want to swim, bike, or do other activities. · Avoid or limit alcohol. Talk to your doctor about whether you can drink any alcohol. · Try to limit how much sodium you eat to less than 2,300 milligrams (mg) a day. Your doctor may ask you to try to eat less than 1,500 mg a day. · Eat plenty of fruits (such as bananas and oranges), vegetables, legumes, whole grains, and low-fat dairy products. · Lower the amount of saturated fat in your diet. Saturated fat is found in animal products such as milk, cheese, and meat. Limiting these foods may help you lose weight and also lower your risk for heart disease. · Do not smoke. Smoking increases your risk for heart attack and stroke. If you need help quitting, talk to your doctor about stop-smoking programs and medicines. These can increase your chances of quitting for good. When should you call for help? Call 911 anytime you think you may need emergency care. This may mean having symptoms that suggest that your blood pressure is causing a serious heart or blood vessel problem. Your blood pressure may be over 180/110. ? For example, call 911 if:  ? · You have symptoms of a heart attack. These may include:  ¨ Chest pain or pressure, or a strange feeling in the chest.  ¨ Sweating. ¨ Shortness of breath. ¨ Nausea or vomiting.   ¨ Pain, pressure, or a strange feeling in the back, neck, jaw, or upper belly or in one or both shoulders or arms.  ¨ Lightheadedness or sudden weakness. ¨ A fast or irregular heartbeat. ? · You have symptoms of a stroke. These may include:  ¨ Sudden numbness, tingling, weakness, or loss of movement in your face, arm, or leg, especially on only one side of your body. ¨ Sudden vision changes. ¨ Sudden trouble speaking. ¨ Sudden confusion or trouble understanding simple statements. ¨ Sudden problems with walking or balance. ¨ A sudden, severe headache that is different from past headaches. ? · You have severe back or belly pain. ?Do not wait until your blood pressure comes down on its own. Get help right away. ?Call your doctor now or seek immediate care if:  ? · Your blood pressure is much higher than normal (such as 180/110 or higher), but you don't have symptoms. ? · You think high blood pressure is causing symptoms, such as:  ¨ Severe headache. ¨ Blurry vision. ? Watch closely for changes in your health, and be sure to contact your doctor if:  ? · Your blood pressure measures 140/90 or higher at least 2 times. That means the top number is 140 or higher or the bottom number is 90 or higher, or both. ? · You think you may be having side effects from your blood pressure medicine. ? · Your blood pressure is usually normal, but it goes above normal at least 2 times. Where can you learn more? Go to http://omar-ricci.info/. Enter X012 in the search box to learn more about \"High Blood Pressure: Care Instructions. \"  Current as of: September 21, 2016  Content Version: 11.4  © 5861-3274 [employer]. Care instructions adapted under license by [employer] (which disclaims liability or warranty for this information). If you have questions about a medical condition or this instruction, always ask your healthcare professional. [place] [address] any warranty or liability for your use of this information.

## 2018-06-01 NOTE — PROGRESS NOTES
Patient is in the office today for  HTN follow up. 1. Have you been to the ER, urgent care clinic since your last visit? Hospitalized since your last visit? No    2. Have you seen or consulted any other health care providers outside of the [address] [place] [address] since your last visit? Include any pap smears or colon screening.  No

## 2018-06-01 NOTE — MR AVS SNAPSHOT
[facility] 
 
 
 [address] [address] [place] [address] 
[phone] Patient: Cherylene Abu MRN: [id number] YXV:[birth year] Visit Information Date & Time Provider Department Dept. Phone Encounter #  
 6/1/2018  9:15 AM Satnam Martin, [address] [place] [address] [phone] [id number] Follow-up Instructions Return in about 6 weeks (around 7/13/2018) for labs 1 week before, BP check. Upcoming Health Maintenance Date Due DTaP/Tdap/Td series (1 - Tdap) 7/2/1994 Influenza Age 5 to Adult 8/1/2018 Allergies as of 6/1/2018  Review Complete On: 6/1/2018 By: Satnam Martin MD  
 No Known Allergies Current Immunizations  Reviewed on 12/28/2016 Name Date Influenza Vaccine 10/31/2016, 10/28/2015 Not reviewed this visit You Were Diagnosed With   
  
 Codes Comments Essential hypertension    -  Primary ICD-10-CM: I10 
ICD-9-CM: 401.9 Testicle swelling     ICD-10-CM: N50.89 ICD-9-CM: 610.38 Class 2 severe obesity due to excess calories with serious comorbidity and body mass index (BMI) of 36.0 to 36.9 in adult [facility])     ICD-10-CM: E66.01, Z68.36 
ICD-9-CM: 278.01, V85.36 Non morbid obesity due to excess calories     ICD-10-CM: E66.09 
ICD-9-CM: 278.00 Vitals BP Pulse Temp Resp Height(growth percentile) Weight(growth percentile) (!) 148/97 (BP 1 Location: Left arm, BP Patient Position: Sitting) 89 98.2 °F (36.8 °C) (Oral) 20 6' 4\" (1.93 m) 298 lb (135.2 kg) SpO2 BMI Smoking Status 95% 36.27 kg/m2 Never Smoker BMI and BSA Data Body Mass Index Body Surface Area  
 36.27 kg/m 2 2.69 m 2 Preferred Pharmacy Pharmacy Name Phone [id number] [place] [address] [facility] [phone] Your Updated Medication List  
  
   
This list is accurate as of 6/1/18  9:45 AM.  Always use your most recent med list.  
  
  
  
  
 amLODIPine-valsartan  mg per tablet Commonly known as:  [place] Tianna Take 1 Tab by mouth daily. diazePAM 2 mg tablet Commonly known as:  VALIUM Take 2.5 Tabs by mouth every six (6) hours as needed for Anxiety. Max Daily Amount: 20 mg. FLUARIX QUAD [id number] (PF) Syrg injection Generic drug:  influenza vaccine 2016-17 (36mos+)(PF)  
inject 0.5 milliliter intramuscularly * ibuprofen 800 mg tablet Commonly known as:  MOTRIN  
800 mg.  
  
 * ibuprofen 800 mg tablet Commonly known as:  MOTRIN Take 1 Tab by mouth every eight (8) hours as needed for Pain.  
  
 ketoconazole 2 % shampoo Commonly known as:  NIZORAL Apply quarter size daily as needed  
  
 oxyCODONE-acetaminophen 5-325 mg per tablet Commonly known as:  PERCOCET Take 1 Tab by mouth every four (4) hours as needed for Pain. Max Daily Amount: 6 Tabs. phentermine 37.5 mg tablet Commonly known as:  ADIPEX-P Take 1 Tab by mouth every morning. Max Daily Amount: 37.5 mg.  
  
 * triamcinolone acetonide 0.1 % topical cream  
Commonly known as:  KENALOG Apply  to affected area two (2) times a day. Apply dime size to affected area daily for 1 week as needed * triamcinolone acetonide 0.1 % topical cream  
Commonly known as:  KENALOG  
apply A DIME SIZED AMOUNT to affected area twice a day for 1 WEEK AS NEEDED * Notice: This list has 4 medication(s) that are the same as other medications prescribed for you. Read the directions carefully, and ask your doctor or other care provider to review them with you. Prescriptions Printed Refills  
 phentermine (ADIPEX-P) 37.5 mg tablet 0 Sig: Take 1 Tab by mouth every morning. Max Daily Amount: 37.5 mg.  
 Class: Print Route: Oral  
  
Prescriptions Sent to Pharmacy Refills  
 amLODIPine-valsartan (EXFORGE)  mg per tablet 2 Sig: Take 1 Tab by mouth daily.   
 Class: Normal  
 [facility]  #: [phone] Route: Oral  
  
We Performed the Following REFERRAL TO UROLOGY [XFW863 Custom] Comments:  
 Refer for testicular swelling Follow-up Instructions Return in about 6 weeks (around 7/13/2018) for labs 1 week before, BP check. Referral Information Referral ID Referred By Referred To  
  
 [id number] [place], [address] Phone: [phone] Fax: [phone] Visits Status Start Date End Date 1 New Request 6/1/18 6/1/19 If your referral has a status of pending review or denied, additional information will be sent to support the outcome of this decision. Patient Instructions High Blood Pressure: Care Instructions Your Care Instructions If your blood pressure is usually above 140/90, you have high blood pressure, or hypertension. That means the top number is 140 or higher or the bottom number is 90 or higher, or both. Despite what a lot of people think, high blood pressure usually doesn't cause headaches or make you feel dizzy or lightheaded. It usually has no symptoms. But it does increase your risk for heart attack, stroke, and kidney or eye damage. The higher your blood pressure, the more your risk increases. Your doctor will give you a goal for your blood pressure. Your goal will be based on your health and your age. An example of a goal is to keep your blood pressure below 140/90. Lifestyle changes, such as eating healthy and being active, are always important to help lower blood pressure. You might also take medicine to reach your blood pressure goal. 
Follow-up care is a key part of your treatment and safety. Be sure to make and go to all appointments, and call your doctor if you are having problems. It's also a good idea to know your test results and keep a list of the medicines you take. How can you care for yourself at home? Medical treatment · If you stop taking your medicine, your blood pressure will go back up. You may take one or more types of medicine to lower your blood pressure. Be safe with medicines. Take your medicine exactly as prescribed. Call your doctor if you think you are having a problem with your medicine. · Talk to your doctor before you start taking aspirin every day. Aspirin can help certain people lower their risk of a heart attack or stroke. But taking aspirin isn't right for everyone, because it can cause serious bleeding. · See your doctor regularly. You may need to see the doctor more often at first or until your blood pressure comes down. · If you are taking blood pressure medicine, talk to your doctor before you take decongestants or anti-inflammatory medicine, such as ibuprofen. Some of these medicines can raise blood pressure. · Learn how to check your blood pressure at home. Lifestyle changes · Stay at a healthy weight. This is especially important if you put on weight around the waist. Losing even 10 pounds can help you lower your blood pressure. · If your doctor recommends it, get more exercise. Walking is a good choice. Bit by bit, increase the amount you walk every day. Try for at least 30 minutes on most days of the week. You also may want to swim, bike, or do other activities. · Avoid or limit alcohol. Talk to your doctor about whether you can drink any alcohol. · Try to limit how much sodium you eat to less than 2,300 milligrams (mg) a day. Your doctor may ask you to try to eat less than 1,500 mg a day. · Eat plenty of fruits (such as bananas and oranges), vegetables, legumes, whole grains, and low-fat dairy products. · Lower the amount of saturated fat in your diet. Saturated fat is found in animal products such as milk, cheese, and meat. Limiting these foods may help you lose weight and also lower your risk for heart disease. · Do not smoke. Smoking increases your risk for heart attack and stroke. If you need help quitting, talk to your doctor about stop-smoking programs and medicines. These can increase your chances of quitting for good. When should you call for help? Call 911 anytime you think you may need emergency care. This may mean having symptoms that suggest that your blood pressure is causing a serious heart or blood vessel problem. Your blood pressure may be over 180/110. ? For example, call 911 if: 
? · You have symptoms of a heart attack. These may include: ¨ Chest pain or pressure, or a strange feeling in the chest. 
¨ Sweating. ¨ Shortness of breath. ¨ Nausea or vomiting. ¨ Pain, pressure, or a strange feeling in the back, neck, jaw, or upper belly or in one or both shoulders or arms. ¨ Lightheadedness or sudden weakness. ¨ A fast or irregular heartbeat. ? · You have symptoms of a stroke. These may include: 
¨ Sudden numbness, tingling, weakness, or loss of movement in your face, arm, or leg, especially on only one side of your body. ¨ Sudden vision changes. ¨ Sudden trouble speaking. ¨ Sudden confusion or trouble understanding simple statements. ¨ Sudden problems with walking or balance. ¨ A sudden, severe headache that is different from past headaches. ? · You have severe back or belly pain. ?Do not wait until your blood pressure comes down on its own. Get help right away. ?Call your doctor now or seek immediate care if: 
? · Your blood pressure is much higher than normal (such as 180/110 or higher), but you don't have symptoms. ? · You think high blood pressure is causing symptoms, such as: ¨ Severe headache. ¨ Blurry vision. ? Watch closely for changes in your health, and be sure to contact your doctor if: 
? · Your blood pressure measures 140/90 or higher at least 2 times. That means the top number is 140 or higher or the bottom number is 90 or higher, or both. ? · You think you may be having side effects from your blood pressure medicine. ? · Your blood pressure is usually normal, but it goes above normal at least 2 times. Where can you learn more? Go to http://omar-ricci.info/. Enter D772 in the search box to learn more about \"High Blood Pressure: Care Instructions. \" Current as of: September 21, 2016 Content Version: 11.4 © 0838-8160 [employer]. Care instructions adapted under license by [employer] (which disclaims liability or warranty for this information). If you have questions about a medical condition or this instruction, always ask your healthcare professional. [address] any warranty or liability for your use of this information. Introducing [facility] & HEALTH SERVICES! María Elena Weinstein introduces [employer] patient portal. Now you can access parts of your medical record, email your doctor's office, and request medication refills online. 1. In your internet browser, go to [email or web address] 2. Click on the First Time User? Click Here link in the Sign In box. You will see the New Member Sign Up page. 3. Enter your [employer] Access Code exactly as it appears below. You will not need to use this code after youve completed the sign-up process. If you do not sign up before the expiration date, you must request a new code. · [employer] Access Code: [email or web address] Expires: 8/30/2018  9:45 AM 
 
4. Enter the last four digits of your Social Security Number (xxxx) and Date of Birth (mm/dd/yyyy) as indicated and click Submit. You will be taken to the next sign-up page. 5. Create a [employer] ID. This will be your [employer] login ID and cannot be changed, so think of one that is secure and easy to remember. 6. Create a [employer] password. You can change your password at any time. 7. Enter your Password Reset Question and Answer.  This can be used at a later time if you forget your password. 8. Enter your e-mail address. You will receive e-mail notification when new information is available in [address] 9. Click Sign Up. You can now view and download portions of your medical record. 10. Click the Download Summary menu link to download a portable copy of your medical information. If you have questions, please visit the Frequently Asked Questions section of the [employer] website. Remember, [employer] is NOT to be used for urgent needs. For medical emergencies, dial 911. Now available from your iPhone and Android! Please provide this summary of care documentation to your next provider. Your primary care clinician is listed as KRISTIN ALBA. If you have any questions after today's visit, please call [phone].

## 2018-06-01 NOTE — PROGRESS NOTES
Catracho Mon is a 38 y.o.  male and presents with Hypertension (f/u); Obesity; and Testicle Swelling      SUBJECTIVE:    Cardiovascular Review:  The patient has hypertension and obesity. Diet and Lifestyle: generally follows a low fat low cholesterol diet, exercises sporadically  Home BP Monitoring: is borderline at home. Pt will monitor on regular basis to see if it is controlled at home with him being compliant with his Lotrel     Pertinent ROS: taking medications as instructed, no medication side effects noted, no TIA's, no chest pain on exertion, no dyspnea on exertion, noting swelling of ankles. Pt continues to struggle with losing weight. He has used Adipex in the past but he has not been able to keep the weight off. Contrave did not work for him. Patient is complaining of testicle swelling. Patient has a history of vasectomy. Will refer back to urology to evaluate for hydrocele. Respiratory ROS: negative for - shortness of breath  Cardiovascular ROS: negative for - chest pain    Current Outpatient Prescriptions   Medication Sig    amLODIPine-valsartan (EXFORGE)  mg per tablet Take 1 Tab by mouth daily.  phentermine (ADIPEX-P) 37.5 mg tablet Take 1 Tab by mouth every morning. Max Daily Amount: 37.5 mg.    triamcinolone acetonide (KENALOG) 0.1 % topical cream apply A DIME SIZED AMOUNT to affected area twice a day for 1 WEEK AS NEEDED    triamcinolone acetonide (KENALOG) 0.1 % topical cream Apply  to affected area two (2) times a day. Apply dime size to affected area daily for 1 week as needed    ibuprofen (MOTRIN) 800 mg tablet 800 mg.  ketoconazole (NIZORAL) 2 % shampoo Apply quarter size daily as needed     No current facility-administered medications for this visit.           OBJECTIVE:  alert, well appearing, and in no distress  Visit Vitals    BP (!) 148/97 (BP 1 Location: Left arm, BP Patient Position: Sitting)    Pulse 89    Temp 98.2 °F (36.8 °C) (Oral)    Resp 20  Ht 6' 4\" (1.93 m)    Wt 298 lb (135.2 kg)    SpO2 95%    BMI 36.27 kg/m2      well developed and well nourished  Chest - clear to auscultation, no wheezes, rales or rhonchi, symmetric air entry  Heart - normal rate, regular rhythm, normal S1, S2, no murmurs, rubs, clicks or gallops  Musculoskeletal - abnormal exam of left lateral ankle with pinpoint tenderness and swelling as well as worsening pain with eversion of the left foot   Extremities - no edema     Labs:   Lab Results   Component Value Date/Time    Cholesterol, total 194 06/14/2017 08:27 AM    HDL Cholesterol 45 06/14/2017 08:27 AM    LDL, calculated 123 (H) 06/14/2017 08:27 AM    Triglyceride 130 06/14/2017 08:27 AM     Lab Results   Component Value Date/Time    Prostate Specific Ag 0.6 12/15/2017 08:24 AM    Prostate Specific Ag 0.6 07/07/2016 09:23 AM    Prostate Specific Ag 0.5 12/31/2015 10:16 AM     Lab Results   Component Value Date/Time    Sodium 140 05/31/2018 09:35 AM    Potassium 4.5 05/31/2018 09:35 AM    Chloride 105 05/31/2018 09:35 AM    CO2 18 05/31/2018 09:35 AM    Glucose 98 05/31/2018 09:35 AM    BUN 15 05/31/2018 09:35 AM    Creatinine 0.97 05/31/2018 09:35 AM    BUN/Creatinine ratio 15 05/31/2018 09:35 AM    GFR est  05/31/2018 09:35 AM    GFR est non-AA 95 05/31/2018 09:35 AM    Calcium 9.7 05/31/2018 09:35 AM    Bilirubin, total 0.5 05/31/2018 09:35 AM    ALT (SGPT) 27 05/31/2018 09:35 AM    AST (SGOT) 23 05/31/2018 09:35 AM    Alk. phosphatase 52 05/31/2018 09:35 AM    Protein, total 7.4 05/31/2018 09:35 AM    Albumin 4.5 05/31/2018 09:35 AM    A-G Ratio 1.6 05/31/2018 09:35 AM          Discussed the patient's BMI with him. The BMI follow up plan is as follows: BMI is out of normal parameters and plan is as follows: I have counseled this patient on diet and exercise regimens. Assessment/Plan      ICD-10-CM ICD-9-CM    1. Essential hypertension I10 401.9  uncontrolled in the office.   Patient says that he is compliant with Lotrel. 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 METABOLIC PANEL, COMPREHENSIVE      LIPID PANEL   2. Class 2 severe obesity due to excess calories with serious comorbidity and body mass index (BMI) of 36.0 to 36.9 in adult [facility]) E66.01 278.01  will continue diet and exercise along with phentermine (ADIPEX-P) 37.5 mg tablet    Z68.36 V85.36    3. Testicle swelling N50.89 608.86 REFERRAL TO UROLOGY   4. Seborrheic dermatitis L21.9 690.10 triamcinolone acetonide (KENALOG) 0.1 % topical cream   5. Screening PSA (prostate specific antigen) Z12.5 V76.44 PSA, DIAGNOSTIC (PROSTATE SPECIFIC AG)       Follow-up Disposition:  Return in about 6 weeks (around 7/13/2018) for labs 1 week before, BP check. Reviewed plan of care. Patient has provided input and agrees with goals.

## 2018-06-06 PROBLEM — E66.01 SEVERE OBESITY (BMI 35.0-39.9): Status: ACTIVE | Noted: 2018-06-06

## 2018-06-07 DIAGNOSIS — Z12.5 SCREENING PSA (PROSTATE SPECIFIC ANTIGEN): ICD-10-CM

## 2018-08-16 ENCOUNTER — OFFICE VISIT (OUTPATIENT)
Dept: FAMILY MEDICINE CLINIC | Age: 43
End: 2018-08-16

## 2018-08-16 VITALS
HEIGHT: 76 IN | OXYGEN SATURATION: 96 % | BODY MASS INDEX: 34.83 KG/M2 | TEMPERATURE: 98 F | WEIGHT: 286 LBS | DIASTOLIC BLOOD PRESSURE: 81 MMHG | SYSTOLIC BLOOD PRESSURE: 128 MMHG | RESPIRATION RATE: 16 BRPM | HEART RATE: 79 BPM

## 2018-08-16 DIAGNOSIS — J02.0 STREP PHARYNGITIS: Primary | ICD-10-CM

## 2018-08-16 DIAGNOSIS — J02.9 SORE THROAT: ICD-10-CM

## 2018-08-16 LAB
S PYO AG THROAT QL: POSITIVE
VALID INTERNAL CONTROL?: YES

## 2018-08-16 RX ORDER — AMOXICILLIN 875 MG/1
875 TABLET, FILM COATED ORAL 2 TIMES DAILY
Qty: 20 TAB | Refills: 0 | Status: SHIPPED | OUTPATIENT
Start: 2018-08-16 | End: 2018-08-26

## 2018-08-16 NOTE — PROGRESS NOTES
Estelita Rucker is a 37 y.o. male presented in clinic today with   Chief Complaint   Patient presents with    Sore Throat     Patient stated he had a sore throat since 08/14/18   1. Have you been to the ER, urgent care clinic since your last visit? Hospitalized since your last visit? No    2. Have you seen or consulted any other health care providers outside of the [address] [place] [address] since your last visit? Include any pap smears or colon screening.  No

## 2018-08-16 NOTE — PROGRESS NOTES
HISTORY OF PRESENT ILLNESS  Lorene Guzman is a 37 y.o. male. Sore Throat    The history is provided by the patient. This is a new problem. The current episode started more than 2 days ago. Associated symptoms include trouble swallowing and cough. Treatments tried: NYquil. The treatment provided no relief. Review of Systems   Constitutional: Positive for chills and fever. HENT: Positive for sore throat and trouble swallowing. Respiratory: Positive for cough. Musculoskeletal: Positive for myalgias. Physical Exam   Constitutional: He appears well-developed and well-nourished. HENT:   Nose: Nose normal.   Mouth/Throat: Posterior oropharyngeal erythema present. Cardiovascular: Normal rate, regular rhythm and normal heart sounds. Pulmonary/Chest: Effort normal and breath sounds normal.   Vitals reviewed. ASSESSMENT and PLAN    ICD-10-CM ICD-9-CM    1. Strep pharyngitis J02.0 034.0 Will treat with amoxicillin (AMOXIL) 875 mg tablet BID for 10 days    2. Sore throat J02.9 462 AMB POC RAPID STREP A positive      Reviewed plan of care. Patient has provided input and agrees with goals.

## 2018-08-16 NOTE — MR AVS SNAPSHOT
[facility] 
 
 
 [address] [address] [address] [place] [address] 
[phone] Patient: Korey Osborn MRN: [id number] Visit Information Date & Time Provider Department Dept. Phone Encounter #  
 8/16/2018  2:45 PM [address] [phone] [id number] Follow-up Instructions Return if symptoms worsen or fail to improve. Upcoming Health Maintenance Date Due DTaP/Tdap/Td series (1 - Tdap) 7/2/1994 Influenza Age 5 to Adult 8/1/2018 Allergies as of 8/16/2018  Review Complete On: 8/16/2018 By: Bogdan Arthur No Known Allergies Current Immunizations  Reviewed on 12/28/2016 Name Date Influenza Vaccine 10/31/2016, 10/28/2015 Not reviewed this visit You Were Diagnosed With   
  
 Codes Comments Strep pharyngitis    -  Primary ICD-10-CM: J02.0 ICD-9-CM: 034.0 Sore throat     ICD-10-CM: J02.9 ICD-9-CM: 098 Vitals BP Pulse Temp Resp Height(growth percentile) Weight(growth percentile) 128/81 (BP 1 Location: Right arm, BP Patient Position: Sitting) 79 98 °F (36.7 °C) (Oral) 16 6' 4\" (1.93 m) 286 lb (129.7 kg) SpO2 BMI Smoking Status 96% 34.81 kg/m2 Never Smoker BMI and BSA Data Body Mass Index Body Surface Area 34.81 kg/m 2 2.64 m 2 Preferred Pharmacy Pharmacy Name Phone [id number] [place] [address] [place] [phone] Your Updated Medication List  
  
   
This list is accurate as of 8/16/18  3:28 PM.  Always use your most recent med list. amLODIPine-valsartan  mg per tablet Commonly known as:  Bettina Moapa Take 1 Tab by mouth daily. amoxicillin 875 mg tablet Commonly known as:  AMOXIL Take 1 Tab by mouth two (2) times a day for 10 days. ibuprofen 800 mg tablet Commonly known as:  MOTRIN  
800 mg.  
  
 ketoconazole 2 % shampoo Commonly known as:  NIZORAL  
 Apply quarter size daily as needed  
  
 phentermine 37.5 mg tablet Commonly known as:  ADIPEX-P Take 1 Tab by mouth every morning. Max Daily Amount: 37.5 mg.  
  
 * triamcinolone acetonide 0.1 % topical cream  
Commonly known as:  KENALOG Apply  to affected area two (2) times a day. Apply dime size to affected area daily for 1 week as needed * triamcinolone acetonide 0.1 % topical cream  
Commonly known as:  KENALOG  
apply A DIME SIZED AMOUNT to affected area twice a day for 1 WEEK AS NEEDED * Notice: This list has 2 medication(s) that are the same as other medications prescribed for you. Read the directions carefully, and ask your doctor or other care provider to review them with you. Prescriptions Printed Refills  
 amoxicillin (AMOXIL) 875 mg tablet 0 Sig: Take 1 Tab by mouth two (2) times a day for 10 days. Class: Print Route: Oral  
  
We Performed the Following AMB POC RAPID STREP A [54104 CPT(R)] Follow-up Instructions Return if symptoms worsen or fail to improve. Introducing [facility] & HEALTH SERVICES! [employer] introduces [employer] patient portal. Now you can access parts of your medical record, email your doctor's office, and request medication refills online. 1. In your internet browser, go to [email or web address] 2. Click on the First Time User? Click Here link in the Sign In box. You will see the New Member Sign Up page. 3. Enter your [employer] Access Code exactly as it appears below. You will not need to use this code after youve completed the sign-up process. If you do not sign up before the expiration date, you must request a new code. · [employer] Access Code: [email or web address] Expires: 8/30/2018  9:45 AM 
 
4. Enter the last four digits of your Social Security Number (xxxx) and Date of Birth (mm/dd/yyyy) as indicated and click Submit. You will be taken to the next sign-up page. 5. Create a [employer] ID. This will be your [employer] login ID and cannot be changed, so think of one that is secure and easy to remember. 6. Create a [employer] password. You can change your password at any time. 7. Enter your Password Reset Question and Answer. This can be used at a later time if you forget your password. 8. Enter your e-mail address. You will receive e-mail notification when new information is available in [address] 9. Click Sign Up. You can now view and download portions of your medical record. 10. Click the Download Summary menu link to download a portable copy of your medical information. If you have questions, please visit the Frequently Asked Questions section of the [employer] website. Remember, [employer] is NOT to be used for urgent needs. For medical emergencies, dial 911. Now available from your iPhone and Android! Please provide this summary of care documentation to your next provider. Your primary care clinician is listed as KRISTIN ALBA. If you have any questions after today's visit, please call [phone].

## 2018-08-16 NOTE — LETTER
NOTIFICATION RETURN TO WORK / SCHOOL 
 
8/16/2018 3:31 PM 
 
Mr. Bradley Hansen 
[address] [facility] [address] To Whom It May Concern: 
 
Bradley Hansen is [facility] the [facility] He will return to work/school on: 8/19/18 If there are questions or concerns please have the patient contact our office. Sincerely, Yu Howard MD

## 2018-12-05 DIAGNOSIS — I10 ESSENTIAL HYPERTENSION: ICD-10-CM

## 2019-03-18 RX ORDER — AMLODIPINE AND VALSARTAN 10; 320 MG/1; MG/1
TABLET ORAL
Qty: 90 TAB | Refills: 0 | Status: SHIPPED | OUTPATIENT
Start: 2019-03-18 | End: 2019-07-04 | Stop reason: SDUPTHER

## 2019-03-29 ENCOUNTER — HOSPITAL ENCOUNTER (OUTPATIENT)
Dept: ULTRASOUND IMAGING | Age: 44
Discharge: HOME | End: 2019-03-29
Attending: INTERNAL MEDICINE
Payer: COMMERCIAL

## 2019-03-29 ENCOUNTER — OFFICE VISIT (OUTPATIENT)
Dept: FAMILY MEDICINE CLINIC | Age: 44
End: 2019-03-29

## 2019-03-29 VITALS
HEART RATE: 82 BPM | OXYGEN SATURATION: 96 % | HEIGHT: 76 IN | SYSTOLIC BLOOD PRESSURE: 154 MMHG | BODY MASS INDEX: 36.04 KG/M2 | DIASTOLIC BLOOD PRESSURE: 98 MMHG | RESPIRATION RATE: 20 BRPM | TEMPERATURE: 97.7 F | WEIGHT: 296 LBS

## 2019-03-29 DIAGNOSIS — E66.01 SEVERE OBESITY WITH BODY MASS INDEX (BMI) OF 35.0 TO 39.9 WITH SERIOUS COMORBIDITY (HCC): ICD-10-CM

## 2019-03-29 DIAGNOSIS — Z12.5 SCREENING PSA (PROSTATE SPECIFIC ANTIGEN): ICD-10-CM

## 2019-03-29 DIAGNOSIS — M19.90 ARTHRITIS: ICD-10-CM

## 2019-03-29 DIAGNOSIS — N50.89 SCROTAL SWELLING: ICD-10-CM

## 2019-03-29 DIAGNOSIS — R53.82 CHRONIC FATIGUE: ICD-10-CM

## 2019-03-29 DIAGNOSIS — I10 ESSENTIAL HYPERTENSION: Primary | ICD-10-CM

## 2019-03-29 PROCEDURE — 76870 US EXAM SCROTUM: CPT

## 2019-03-29 RX ORDER — SPIRONOLACTONE 25 MG/1
25 TABLET ORAL DAILY
Qty: 90 TAB | Refills: 3 | Status: SHIPPED | OUTPATIENT
Start: 2019-03-29 | End: 2020-03-09

## 2019-03-29 NOTE — PATIENT INSTRUCTIONS
High Blood Pressure: Care Instructions Overview It's normal for blood pressure to go up and down throughout the day. But if it stays up, you have high blood pressure. Another name for high blood pressure is hypertension. Despite what a lot of people think, high blood pressure usually doesn't cause headaches or make you feel dizzy or lightheaded. It usually has no symptoms. But it does increase your risk of stroke, heart attack, and other problems. You and your doctor will talk about your risks of these problems based on your blood pressure. Your doctor will give you a goal for your blood pressure. Your goal will be based on your health and your age. Lifestyle changes, such as eating healthy and being active, are always important to help lower blood pressure. You might also take medicine to reach your blood pressure goal. 
Follow-up care is a key part of your treatment and safety. Be sure to make and go to all appointments, and call your doctor if you are having problems. It's also a good idea to know your test results and keep a list of the medicines you take. How can you care for yourself at home? Medical treatment · If you stop taking your medicine, your blood pressure will go back up. You may take one or more types of medicine to lower your blood pressure. Be safe with medicines. Take your medicine exactly as prescribed. Call your doctor if you think you are having a problem with your medicine. · Talk to your doctor before you start taking aspirin every day. Aspirin can help certain people lower their risk of a heart attack or stroke. But taking aspirin isn't right for everyone, because it can cause serious bleeding. · See your doctor regularly. You may need to see the doctor more often at first or until your blood pressure comes down. · If you are taking blood pressure medicine, talk to your doctor before you take decongestants or anti-inflammatory medicine, such as ibuprofen. Some of these medicines can raise blood pressure. · Learn how to check your blood pressure at home. Lifestyle changes · Stay at a healthy weight. This is especially important if you put on weight around the waist. Losing even 10 pounds can help you lower your blood pressure. · If your doctor recommends it, get more exercise. Walking is a good choice. Bit by bit, increase the amount you walk every day. Try for at least 30 minutes on most days of the week. You also may want to swim, bike, or do other activities. · Avoid or limit alcohol. Talk to your doctor about whether you can drink any alcohol. · Try to limit how much sodium you eat to less than 2,300 milligrams (mg) a day. Your doctor may ask you to try to eat less than 1,500 mg a day. · Eat plenty of fruits (such as bananas and oranges), vegetables, legumes, whole grains, and low-fat dairy products. · Lower the amount of saturated fat in your diet. Saturated fat is found in animal products such as milk, cheese, and meat. Limiting these foods may help you lose weight and also lower your risk for heart disease. · Do not smoke. Smoking increases your risk for heart attack and stroke. If you need help quitting, talk to your doctor about stop-smoking programs and medicines. These can increase your chances of quitting for good. When should you call for help? Call 911 anytime you think you may need emergency care. This may mean having symptoms that suggest that your blood pressure is causing a serious heart or blood vessel problem. Your blood pressure may be over 180/120. 
 For example, call 911 if: 
  · You have symptoms of a heart attack. These may include: 
? Chest pain or pressure, or a strange feeling in the chest. 
? Sweating. ? Shortness of breath. ? Nausea or vomiting. ? Pain, pressure, or a strange feeling in the back, neck, jaw, or upper belly or in one or both shoulders or arms. ? Lightheadedness or sudden weakness. ? A fast or irregular heartbeat.  
  · You have symptoms of a stroke. These may include: 
? Sudden numbness, tingling, weakness, or loss of movement in your face, arm, or leg, especially on only one side of your body. ? Sudden vision changes. ? Sudden trouble speaking. ? Sudden confusion or trouble understanding simple statements. ? Sudden problems with walking or balance. ? A sudden, severe headache that is different from past headaches.  
  · You have severe back or belly pain.  
 Do not wait until your blood pressure comes down on its own. Get help right away. 
 Call your doctor now or seek immediate care if: 
  · Your blood pressure is much higher than normal (such as 180/120 or higher), but you don't have symptoms.  
  · You think high blood pressure is causing symptoms, such as: 
? Severe headache. 
? Blurry vision.  
 Watch closely for changes in your health, and be sure to contact your doctor if: 
  · Your blood pressure measures higher than your doctor recommends at least 2 times. That means the top number is higher or the bottom number is higher, or both.  
  · You think you may be having side effects from your blood pressure medicine. Where can you learn more? Go to http://omar-ricci.info/. Enter T522 in the search box to learn more about \"High Blood Pressure: Care Instructions. \" Current as of: July 22, 2018 Content Version: 11.9 © 2877-7346 [employer]. Care instructions adapted under license by [employer] (which disclaims liability or warranty for this information). If you have questions about a medical condition or this instruction, always ask your healthcare professional. [place] [address] any warranty or liability for your use of this information.

## 2019-03-29 NOTE — PROGRESS NOTES
Julieth Bravo is a 37 y.o.  male and presents with Hypertension; Obesity; Sleep Problem; and Testicle Swelling SUBJECTIVE: 
 
Cardiovascular Review: 
The patient has hypertension and obesity. Diet and Lifestyle: not attempting to follow a low fat, low cholesterol diet, exercises sporadically Home BP Monitoring: is poorly controlled at home. Pt says he is compliant with taking his BP meds Pertinent ROS: taking medications as instructed, no medication side effects noted, no TIA's, no chest pain on exertion, no dyspnea on exertion, no swelling of ankles. Pt continues to struggle with losing weight. He has used Adipex in the past but he has not been able to keep the weight off. Contrave did not work for him. Pt was made aware of intermittent fasting and see if it is an option for him. Pt also feels tired in the AM and does not rest well at night. He has been told he snores and his neck size is 18. Pt is a good candidate for sleep apnea evaluation. Untreated sleep apnea could also be affecting his BP. Pt has also noticed a lot of joint pains in his knees and hips. Will check for any metabolic cause such as RA. Pt also has had left scrotal swelling for the last  several months with it slowly increasing over the last few months. Respiratory ROS: negative for - shortness of breath Cardiovascular ROS: negative for - chest pain Current Outpatient Medications Medication Sig  
 spironolactone (ALDACTONE) 25 mg tablet Take 1 Tab by mouth daily.  amLODIPine-valsartan (EXFORGE)  mg per tablet TAKE 1 TABLET BY MOUTH EVERY DAY  ibuprofen (MOTRIN) 800 mg tablet 800 mg.  triamcinolone acetonide (KENALOG) 0.1 % topical cream apply A DIME SIZED AMOUNT to affected area twice a day for 1 WEEK AS NEEDED  
 ketoconazole (NIZORAL) 2 % shampoo Apply quarter size daily as needed  triamcinolone acetonide (KENALOG) 0.1 % topical cream Apply  to affected area two (2) times a day. Apply dime size to affected area daily for 1 week as needed  phentermine (ADIPEX-P) 37.5 mg tablet Take 1 Tab by mouth every morning. Max Daily Amount: 37.5 mg. No current facility-administered medications for this visit. OBJECTIVE: 
alert, well appearing, and in no distress Visit Vitals BP (!) 154/98 (BP 1 Location: Left arm, BP Patient Position: Sitting) Pulse 82 Temp 97.7 °F (36.5 °C) (Oral) Resp 20 Ht 6' 4\" (1.93 m) Wt 296 lb (134.3 kg) SpO2 96% BMI 36.03 kg/m²  
  
well developed and well nourished Chest - clear to auscultation, no wheezes, rales or rhonchi, symmetric air entry Heart - normal rate, regular rhythm, normal S1, S2, no murmurs, rubs, clicks or gallops Extremities - no edema Labs:  
Lab Results Component Value Date/Time Cholesterol, total 194 06/14/2017 08:27 AM  
 HDL Cholesterol 45 06/14/2017 08:27 AM  
 LDL, calculated 123 (H) 06/14/2017 08:27 AM  
 Triglyceride 130 06/14/2017 08:27 AM  
 
Lab Results Component Value Date/Time  
 Prostate Specific Ag 0.6 12/15/2017 08:24 AM  
 Prostate Specific Ag 0.6 07/07/2016 09:23 AM  
 Prostate Specific Ag 0.5 12/31/2015 10:16 AM  
 
Lab Results Component Value Date/Time Sodium 140 05/31/2018 09:35 AM  
 Potassium 4.5 05/31/2018 09:35 AM  
 Chloride 105 05/31/2018 09:35 AM  
 CO2 18 05/31/2018 09:35 AM  
 Glucose 98 05/31/2018 09:35 AM  
 BUN 15 05/31/2018 09:35 AM  
 Creatinine 0.97 05/31/2018 09:35 AM  
 BUN/Creatinine ratio 15 05/31/2018 09:35 AM  
 GFR est  05/31/2018 09:35 AM  
 GFR est non-AA 95 05/31/2018 09:35 AM  
 Calcium 9.7 05/31/2018 09:35 AM  
 Bilirubin, total 0.5 05/31/2018 09:35 AM  
 ALT (SGPT) 27 05/31/2018 09:35 AM  
 AST (SGOT) 23 05/31/2018 09:35 AM  
 Alk.  phosphatase 52 05/31/2018 09:35 AM  
 Protein, total 7.4 05/31/2018 09:35 AM  
 Albumin 4.5 05/31/2018 09:35 AM  
 A-G Ratio 1.6 05/31/2018 09:35 AM  
  
 
 
 Discussed the patient's BMI with him. The BMI follow up plan is as follows: BMI is out of normal parameters and plan is as follows: I have counseled this patient on diet and exercise regimens. Assessment/Plan ICD-10-CM ICD-9-CM 1. Essential hypertension I10 401.9 Uncontrolled on Exforge 10/320 mg. will add  spironolactone (ALDACTONE) 25 mg tablet LIPID PANEL  
   METABOLIC PANEL, COMPREHENSIVE 2. Severe obesity with body mass index (BMI) of 35.0 to 39.9 with serious comorbidity (HCC) E66.01 278.01 Pt will try to lose weight with diet and exercise and try  intermittent fasting if he thinks it is an option for him. 3. Chronic fatigue R53.82 780.79 REFERRAL TO PULMONARY DISEASE for sleep apnea evaluation. 4. Screening PSA (prostate specific antigen) Z12.5 V76.44 PSA, DIAGNOSTIC (PROSTATE SPECIFIC AG) 5. Scrotal swelling N50.89 608.86 US SCROTUM/TESTICLES  
   SED RATE (ESR) 6. Arthritis M19.90 716.90 Will check SED RATE (ESR)  
   RA + CCP ABS  
   [facility] COMPREHENSIVE PANEL Follow-up and Dispositions · Return in about 3 months (around 6/29/2019) for BP check. Reviewed plan of care. Patient has provided input and agrees with goals.

## 2019-03-29 NOTE — PROGRESS NOTES
Patient is in the office today for 6 week follow up. 1. Have you been to the ER, urgent care clinic since your last visit? Hospitalized since your last visit? No 
 
2. Have you seen or consulted any other health care providers outside of the [address] [place] [address] since your last visit? Include any pap smears or colon screening.  No

## 2019-04-01 LAB
ALBUMIN SERPL-MCNC: 4.2 G/DL (ref 3.5–5.5)
ALBUMIN/GLOB SERPL: 1.3 {RATIO} (ref 1.2–2.2)
ALP SERPL-CCNC: 60 IU/L (ref 39–117)
ALT SERPL-CCNC: 25 IU/L (ref 0–44)
AST SERPL-CCNC: 17 IU/L (ref 0–40)
BILIRUB SERPL-MCNC: 0.5 MG/DL (ref 0–1.2)
BUN SERPL-MCNC: 14 MG/DL (ref 6–24)
BUN/CREAT SERPL: 14 (ref 9–20)
CALCIUM SERPL-MCNC: 9.5 MG/DL (ref 8.7–10.2)
CCP IGA+IGG SERPL IA-ACNC: 7 UNITS (ref 0–19)
CENTROMERE B AB SER-ACNC: 0.3 AI (ref 0–0.9)
CHLORIDE SERPL-SCNC: 105 MMOL/L (ref 96–106)
CHOLEST SERPL-MCNC: 195 MG/DL (ref 100–199)
CHROMATIN AB SERPL-ACNC: 0.2 AI (ref 0–0.9)
CO2 SERPL-SCNC: 22 MMOL/L (ref 20–29)
CREAT SERPL-MCNC: 0.99 MG/DL (ref 0.76–1.27)
DSDNA AB SER-ACNC: <1 IU/ML (ref 0–9)
ENA JO1 AB SER-ACNC: <0.2 AI (ref 0–0.9)
ENA RNP AB SER-ACNC: <0.2 AI (ref 0–0.9)
ENA SCL70 AB SER-ACNC: <0.2 AI (ref 0–0.9)
ENA SM AB SER-ACNC: <0.2 AI (ref 0–0.9)
ENA SS-A AB SER-ACNC: <0.2 AI (ref 0–0.9)
ENA SS-B AB SER-ACNC: <0.2 AI (ref 0–0.9)
ERYTHROCYTE [SEDIMENTATION RATE] IN BLOOD BY WESTERGREN METHOD: 24 MM/HR (ref 0–15)
GLOBULIN SER CALC-MCNC: 3.3 G/DL (ref 1.5–4.5)
GLUCOSE SERPL-MCNC: 93 MG/DL (ref 65–99)
HDLC SERPL-MCNC: 43 MG/DL
LDLC SERPL CALC-MCNC: 131 MG/DL (ref 0–99)
POTASSIUM SERPL-SCNC: 4.5 MMOL/L (ref 3.5–5.2)
PROT SERPL-MCNC: 7.5 G/DL (ref 6–8.5)
PSA SERPL-MCNC: 0.6 NG/ML (ref 0–4)
RHEUMATOID FACT SERPL-ACNC: <10 IU/ML (ref 0–13.9)
SEE BELOW, 164869: NORMAL
SODIUM SERPL-SCNC: 142 MMOL/L (ref 134–144)
TRIGL SERPL-MCNC: 107 MG/DL (ref 0–149)
VLDLC SERPL CALC-MCNC: 21 MG/DL (ref 5–40)

## 2019-04-04 ENCOUNTER — TELEPHONE (OUTPATIENT)
Dept: FAMILY MEDICINE CLINIC | Age: 44
End: 2019-04-04

## 2019-04-04 DIAGNOSIS — N43.2 OTHER HYDROCELE: Primary | ICD-10-CM

## 2019-04-04 NOTE — TELEPHONE ENCOUNTER
Tell pt his left testicle has fluid in it. No cancer. Will refer to Urologist to talk about removing the fluid.  Rest of blood work is good

## 2019-04-05 NOTE — TELEPHONE ENCOUNTER
Patient is aware fluid in testicle, no cancer. Patient will be referred to Urology to discuss having fluid removed. Rest of blood work good. Patient verbalizes understanding.

## 2019-10-04 ENCOUNTER — TELEPHONE (OUTPATIENT)
Dept: FAMILY MEDICINE CLINIC | Age: 44
End: 2019-10-04

## 2019-10-04 NOTE — TELEPHONE ENCOUNTER
Please advise if patient needs form faxed to [phone] (Mariusz Huitron) or was patient going to be referred.

## 2019-10-04 NOTE — TELEPHONE ENCOUNTER
Patient is aware of the following appointment:    Dr. Rachel Hamm [phone]  Oct. 31, 2019 at 11:00 am check in at 10:30 am  [address]      Patient verbalizes understanding.

## 2019-10-04 NOTE — TELEPHONE ENCOUNTER
Patient called and said that  was going to place a referral for him to have sleep study but no one has contacted him . I didn't see a order or referral placed in his chart. Can a referral/order for a sleep study be placed for the patient?

## 2019-10-31 ENCOUNTER — OFFICE VISIT (OUTPATIENT)
Dept: PULMONOLOGY | Age: 44
End: 2019-10-31

## 2019-10-31 VITALS
SYSTOLIC BLOOD PRESSURE: 148 MMHG | RESPIRATION RATE: 20 BRPM | BODY MASS INDEX: 36.7 KG/M2 | HEART RATE: 83 BPM | WEIGHT: 301.4 LBS | DIASTOLIC BLOOD PRESSURE: 98 MMHG | HEIGHT: 76 IN | OXYGEN SATURATION: 96 % | TEMPERATURE: 97.5 F

## 2019-10-31 DIAGNOSIS — E66.9 OBESITY (BMI 30-39.9): ICD-10-CM

## 2019-10-31 DIAGNOSIS — I10 ESSENTIAL HYPERTENSION: ICD-10-CM

## 2019-10-31 DIAGNOSIS — G47.19 EXCESSIVE DAYTIME SLEEPINESS: ICD-10-CM

## 2019-10-31 DIAGNOSIS — R06.83 SNORING: ICD-10-CM

## 2019-10-31 DIAGNOSIS — Z23 ENCOUNTER FOR IMMUNIZATION: Primary | ICD-10-CM

## 2019-10-31 NOTE — PROGRESS NOTES
Ana Nguyen is a 53 y.o. male who presents for routine immunizations. He denies any symptoms , reactions or allergies that would exclude them from being immunized today. Risks and adverse reactions were discussed and the VIS was given to them. All questions were addressed. He was observed for 10 min post injection. There were no reactions observed.     Frank Harrison LPN

## 2019-10-31 NOTE — PROGRESS NOTES
Tiara Grider presents today for   Chief Complaint   Patient presents with   Farshad Marcus Referral / Consult     referred by Dr. Ramón Gamez for sleep evaluation d/t chronic fatigue       Is someone accompanying this pt? No    Is the patient using any DME equipment during OV? No    -DME Company N/A    Depression Screening:  3 most recent PHQ Screens 10/31/2019   Little interest or pleasure in doing things Not at all   Feeling down, depressed, irritable, or hopeless Not at all   Total Score PHQ 2 0       Learning Assessment:  Learning Assessment 10/31/2019   PRIMARY LEARNER Patient   HIGHEST LEVEL OF EDUCATION - PRIMARY LEARNER  -   BARRIERS PRIMARY LEARNER -   CO-LEARNER CAREGIVER -   PRIMARY LANGUAGE ENGLISH   LEARNER PREFERENCE PRIMARY READING   ANSWERED BY Patient   RELATIONSHIP SELF       Abuse Screening:  Abuse Screening Questionnaire 3/29/2019   Do you ever feel afraid of your partner? N   Are you in a relationship with someone who physically or mentally threatens you? N   Is it safe for you to go home? Y       Fall Risk  No flowsheet data found. Coordination of Care:  1. Have you been to the ER, urgent care clinic since your last visit? Hospitalized since your last visit? No    2. Have you seen or consulted any other health care providers outside of the [address] [place] [address] since your last visit? Include any pap smears or colon screening.  No

## 2019-10-31 NOTE — PROGRESS NOTES
[facility]  Pulmonary, Critical Care, and Sleep Medicine    Office Progress Note- Initial Evaluation      Primary Care Physician: Natali Wilkerson MD     Reason for Visit:  Evaluation for possible CATHY    Assessment:  1. Snoring: Patient has symptoms and exam findings highly suggestive of a sleep breathing disorder, such as CATHY. Given severity of symptoms and comorbidities additional in-lab sleep testing is indicated. 2. Excessive Daytime Sleepiness (EDS)  3. Hypertension- BP elevated today, had not taken his medications- patient went to his car to take meds. Improved but still high  4. Obesity: Body mass index is 36.69 kg/m². Plan:  ·   · Schedule patient for home sleep study for further evaluation. · Potential consequences of untreated sleep apnea, and/or excessive daytime sleepiness were discussed with the patient. · Educational materials provided. · Treatment options including CPAP, dental appliance, weight reduction measures, positional therapy, surgeries etc were discussed. · Healthy lifestyle changes to include weight loss and exercise discussed. · Healthy sleep habits were reviewed and encouraged. ·  and workplace safety reviewed and discussed as appropriate. Drowsy and/or inattentive driving should be avoided. - Patient denies drowsy driving  · Follow-up in after sleep testing, sooner should new symptoms or problems arise. History of Present Illness: Mr. Ana Nguyen is a 53 y.o. male patient who presents upon the encouragement of his co-workers who have seen him sleep. The history was provided by the patient. Occupation: - Drives [employer]- [employer]                        Work Schedule: 24 hr Shifts 10 days/month  Shift work: Yes    Driving:  yes  Drowsy Driving: is not reported. Motor vehicle accident(s) associated with drowsy driving have not occurred. Snoring: This is a Chronic problem which has been ongoing for years.  Patient is not aware that he snores. Witnessed apneas are not reported. Fatigue: This is a Chronic problem which has been ongoing for years. Dental: Teeth clenching or grinding is not reported. Naps: are reported. Most naps are spontaneous between [id number]    Leg Symptoms/Pain: He does not have unpleasant or crawling sensation in legs or strong urge to move when inactive. Does have chronic, intermittent knee pain. GERD: is reported. Varies with diet- especially with tomatoes. Treated with OTC TUMS    Mood: Happy. Sleep-Wake History:     Estimates sleeping approximately 7-8 hours per night/day. Reports sleeping in a Bed with 2 pillows. He gets into bed at approximately [phone] when at home and 2300 when working at [employer]. Once in bed, he falls asleep easily. Reports waking up to use the bathroom 2 times nightly. Pain, typically does disturb their sleep due to knee pain. He will toss and turn until he finds a comfortable spot. Vivid dreams are reported about 2 times monthly. He normally awakens without an alarm to start their day at 0630. He  typically gets out of bed 0630 . Waking up with a morning headache is not reported. Awakening with a dry mouth is not  reported. Symptom(s) suggestive of cataplexy are not reported. Sleep paralysis is not  reported. Hypnagogic and/or hypnopompic hallucinations are not reported. Sleep walking is not  reported. Sleep talking is not  reported. Other unusual and/or parasomnia behaviors are not reported. Family Sleep History:  None known        Stop Levonne Pleasant 10/31/2019   Does the patient snore loudly (louder than talking or loud enough to be heard through closed doors)? 1   Does the patient often feel tired, fatigued, or sleepy during the daytime, even after a \"good\" night's sleep?  1   Has anyone ever observed the patient stop breathing during their sleep?  0   Does the patient have or are they being treated for high blood pressure? 1   Is the patient's BMI greater than 35? 1   Is your neck circumference greater than 17 inches (Male) or 16 inches (Female)? 1   Is the patient older than 48? 0   Is the patient male? 1   CATHY Score 6       3 most recent PHQ Screens 10/31/2019   Little interest or pleasure in doing things Not at all   Feeling down, depressed, irritable, or hopeless Not at all   Total Score PHQ 2 0       Hoagland Scale 10/31/2019   Sitting and Reading 1   Watching TV 3   Sitting, inactive in a public place (e.g. a movie theater or meeting) 1   As a passenger in a car for an hour, without a break 1   Lying down to rest in the afternoon, when circumstances permit 3   Sitting and talking to someone 1   Sitting quietly after lunch without alcohol 2   In a car, while stopped for a few minutes in traffic 0   Hoagland Sleepiness Score 12        Neck circ.  in \"inches\": 19.5         Past Medical History:  Past Medical History:   Diagnosis Date    ED (erectile dysfunction)     Hypertension     Obesity 5/25/2010    Sinuitis        Past Surgical History:  Past Surgical History:   Procedure Laterality Date    HX APPENDECTOMY  2005       Family History:  Family History   Problem Relation Age of Onset    Hypertension Father        Social History:  Social History     Tobacco Use    Smoking status: Never Smoker    Smokeless tobacco: Never Used   Substance Use Topics    Alcohol use: Yes     Comment: rarely 1 q wk    Drug use: Never        Caffeine Amount Time of last Intake Comments   Coffee 1 C/am     Soda None     Tea 2/day  Sweet Tea   Energy Drinks 1/week  Kick Start- [employer]   Over- the - counter stimulant pills None     Other Substances      Alcohol Social  Whiskey and Ginger Ale   Tobacco None     Drugs CBD- Hemp For leg pain sublingual   Other: None         Medications:  Current Outpatient Medications on File Prior to Visit   Medication Sig Dispense Refill    amLODIPine-valsartan (EXFORGE)  mg per tablet TAKE 1 TABLET BY MOUTH EVERY DAY 90 Tab 1    ibuprofen (MOTRIN) 800 mg tablet Take 800 mg by mouth every eight (8) hours as needed.  ketoconazole (NIZORAL) 2 % shampoo Apply quarter size daily as needed 120 mL 2    triamcinolone acetonide (KENALOG) 0.1 % topical cream Apply  to affected area two (2) times a day. Apply dime size to affected area daily for 1 week as needed 30 g 2    spironolactone (ALDACTONE) 25 mg tablet Take 1 Tab by mouth daily. 90 Tab 3    phentermine (ADIPEX-P) 37.5 mg tablet Take 1 Tab by mouth every morning. Max Daily Amount: 37.5 mg. 30 Tab 0    triamcinolone acetonide (KENALOG) 0.1 % topical cream apply A DIME SIZED AMOUNT to affected area twice a day for 1 WEEK AS NEEDED 30 g 2     No current facility-administered medications on file prior to visit.          Allergy:  No Known Allergies    Review of Systems  General ROS: positive for  - fatigue, sleep disturbance and weight gain  negative for - chills, fever, hot flashes, malaise or night sweats  ENT ROS: positive for - nasal congestion  negative for - epistaxis, nasal discharge, nasal polyps, oral lesions, sinus pain, sneezing, sore throat, tinnitus, vertigo, visual changes or vocal changes  Hematological and Lymphatic ROS: negative for - bleeding problems, blood clots, bruising, jaundice, pallor or swollen lymph nodes  Endocrine ROS: negative for - polydipsia/polyuria, skin changes, temperature intolerance or unexpected weight changes  Respiratory ROS: no cough, shortness of breath, or wheezing  Cardiovascular ROS: no chest pain or dyspnea on exertion  Gastrointestinal ROS: no abdominal pain, change in bowel habits, or black or bloody stools  Genito-Urinary ROS: no dysuria, trouble voiding, or hematuria  Musculoskeletal ROS: chronic knee pain - and as noted above  Neurological ROS: no TIA or stroke symptoms  Dermatological ROS: negative for - pruritus, rash or skin lesion changes   Psychological ROS: as noted above   Otherwise negative and per HPI      Physical Exam:  Blood pressure (!) 154/106, pulse 83, temperature 97.5 °F (36.4 °C), temperature source Oral, resp. rate 20, height 6' 4\" (1.93 m), weight 136.7 kg (301 lb 6.4 oz), SpO2 96 %. on RA, Body mass index is 36.69 kg/m². General: No distress, acyanotic, appears stated age, cooperative, pleasant  HEENT: PERRL, EOMI, throat without erythema or exudate, Tongue- large with dental indention on tongue, Mallampati's score 3+, Uvula- midline, Tonsils- 2+ with clefts, Tympanic membranes are  Neck: Supple, short no abnormally enlarged lymph nodes, thyroid is not enlarged, non-tender, No JVD, No carotid bruit  Chest: Normal.  Lungs: Moderate air entry, clear to auscultation bilaterally,   Heart: Regular rate and rhythm, S1S2 present, without murmur. Abdomen: Obese, bowel sounds normoactive, abdomen is soft without significant tenderness, or guarding. Extremity: Negative for cyanosis, edema, or clubbing. Skin: Skin color, texture, turgor normal. No rashes or lesions. Neurological: CN 2-12 grossly intact, normal muscle tone.     Data Reviewed:  CBC:   Lab Results   Component Value Date/Time    WBC 3.0 (L) 12/31/2015 10:16 AM    HGB 14.8 12/31/2015 10:16 AM    HCT 43.6 12/31/2015 10:16 AM    PLATELET 541 30/75/8129 10:16 AM    MCV 83 12/31/2015 10:16 AM       BMP:   Lab Results   Component Value Date/Time    Sodium 142 03/29/2019 09:40 AM    Potassium 4.5 03/29/2019 09:40 AM    Chloride 105 03/29/2019 09:40 AM    CO2 22 03/29/2019 09:40 AM    Glucose 93 03/29/2019 09:40 AM    BUN 14 03/29/2019 09:40 AM    Creatinine 0.99 03/29/2019 09:40 AM    BUN/Creatinine ratio 14 03/29/2019 09:40 AM    GFR est  03/29/2019 09:40 AM    GFR est non-AA 92 03/29/2019 09:40 AM    Calcium 9.5 03/29/2019 09:40 AM        TSH:  No results found for: TSH, TSH2, TSH3, TSHP, TSHELE, TSHEXT     No results found for: HBA1C, HGBE8, JEE2KBGG, OCK8DBVB      Imaging:  [x]I have personally reviewed the patients radiographs section   Results from Hospital Encounter encounter on 02/14/12   XR SACRUM AND COCCYX    Narrative Ordering MD: Zelda Marie MD  Signed By: Krista Reina MD  ** FINAL **  ---------------------------------------------------------------------  Procedure Date:  02/14/2012   Accession Number:  [id number]           Order No:   [id number]         Procedure:   [facility] - SACRUM AND COCCYX        CPT Code:   62303      Admit Diag:   MYALGIA & MYOSITIS NOS              Reason:   TAILBONE PAIN  INTERPRETATION:  HISTORY: Coccygeal pain. Exam: Sacrum and coccyx. Technique: 4 views of sacrum and coccyx were obtained. Study was   correlated 01/28/2000 a exam.  FINDINGS: No acute fracture, dislocation or radiopaque foreign   bodies are seen. Marked osteoarthritis of bilateral hip joints right   more than left is redemonstrated. Soft tissues are unremarkable. IMPRESSION:  1. No acute fracture-dislocation. 2. Marked osteoarthritis of bilateral hip joints right more than   left. No results found for this or any previous visit. Historical Sleep Testing Data: No Testing To Date.           Marquita Wilson DO, [facility]  Pulmonary, Sleep and Critical Care Medicine

## 2019-10-31 NOTE — LETTER
10/31/19 Patient: Claire Smith YOB: 1975 Date of Visit: 10/31/2019 Kerry Mosley MD 
[address] [id number] VIA In Basket Dear Kerry Mosley MD, Thank you for referring Mr. Felicia Wing to [address] [place] [address] for evaluation. My notes for this consultation are attached. If you have questions, please do not hesitate to call me. I look forward to following your patient along with you.  
 
 
Sincerely, 
 
Linus Mann DO

## 2019-11-08 DIAGNOSIS — L21.9 SEBORRHEIC DERMATITIS: ICD-10-CM

## 2019-11-09 RX ORDER — TRIAMCINOLONE ACETONIDE 1 MG/G
CREAM TOPICAL
Qty: 30 G | Refills: 2 | Status: SHIPPED | OUTPATIENT
Start: 2019-11-09 | End: 2021-09-07 | Stop reason: SDUPTHER

## 2019-12-03 DIAGNOSIS — I10 ESSENTIAL HYPERTENSION: ICD-10-CM

## 2019-12-03 DIAGNOSIS — Z23 ENCOUNTER FOR IMMUNIZATION: ICD-10-CM

## 2019-12-03 DIAGNOSIS — R06.83 SNORING: ICD-10-CM

## 2019-12-03 DIAGNOSIS — E66.9 OBESITY (BMI 30-39.9): ICD-10-CM

## 2019-12-03 DIAGNOSIS — G47.19 EXCESSIVE DAYTIME SLEEPINESS: ICD-10-CM

## 2020-01-29 DIAGNOSIS — G47.33 OSA (OBSTRUCTIVE SLEEP APNEA): Primary | ICD-10-CM

## 2020-01-29 NOTE — PROGRESS NOTES
Patient underwent a Novasom study ,notable for severe CATHY will place order for PAP therapy APAP 8/20

## 2020-02-05 ENCOUNTER — TELEPHONE (OUTPATIENT)
Dept: PULMONOLOGY | Age: 45
End: 2020-02-05

## 2020-02-25 RX ORDER — AMLODIPINE AND VALSARTAN 10; 320 MG/1; MG/1
1 TABLET ORAL DAILY
Qty: 30 TAB | Refills: 0 | Status: SHIPPED | OUTPATIENT
Start: 2020-02-25 | End: 2020-02-25 | Stop reason: SDUPTHER

## 2020-02-25 RX ORDER — AMLODIPINE AND VALSARTAN 10; 320 MG/1; MG/1
1 TABLET ORAL DAILY
Qty: 30 TAB | Refills: 0 | Status: SHIPPED | OUTPATIENT
Start: 2020-02-25 | End: 2020-03-09 | Stop reason: SDUPTHER

## 2020-02-25 NOTE — TELEPHONE ENCOUNTER
Pended a 30 ds until next appointment. Please sign if appropriate. Requested Prescriptions     Pending Prescriptions Disp Refills    amLODIPine-valsartan (EXFORGE)  mg per tablet [Pharmacy Med Name: AMLODIPINE-VALSARTAN  MG] 30 Tab 0     Sig: Take 1 Tab by mouth daily.

## 2020-03-09 ENCOUNTER — OFFICE VISIT (OUTPATIENT)
Dept: FAMILY MEDICINE CLINIC | Age: 45
End: 2020-03-09

## 2020-03-09 VITALS
RESPIRATION RATE: 20 BRPM | OXYGEN SATURATION: 98 % | HEIGHT: 76 IN | WEIGHT: 303 LBS | SYSTOLIC BLOOD PRESSURE: 154 MMHG | BODY MASS INDEX: 36.9 KG/M2 | HEART RATE: 81 BPM | DIASTOLIC BLOOD PRESSURE: 91 MMHG | TEMPERATURE: 97.9 F

## 2020-03-09 DIAGNOSIS — G47.33 OBSTRUCTIVE SLEEP APNEA SYNDROME: ICD-10-CM

## 2020-03-09 DIAGNOSIS — I10 ESSENTIAL HYPERTENSION: Primary | ICD-10-CM

## 2020-03-09 DIAGNOSIS — Z12.5 PROSTATE CANCER SCREENING: ICD-10-CM

## 2020-03-09 DIAGNOSIS — E66.01 CLASS 2 SEVERE OBESITY DUE TO EXCESS CALORIES WITH SERIOUS COMORBIDITY AND BODY MASS INDEX (BMI) OF 36.0 TO 36.9 IN ADULT (HCC): ICD-10-CM

## 2020-03-09 RX ORDER — PHENTERMINE HYDROCHLORIDE 37.5 MG/1
37.5 TABLET ORAL
Qty: 30 TAB | Refills: 0 | Status: SHIPPED | OUTPATIENT
Start: 2020-03-09 | End: 2020-10-27 | Stop reason: SDUPTHER

## 2020-03-09 RX ORDER — AMLODIPINE AND VALSARTAN 10; 320 MG/1; MG/1
1 TABLET ORAL DAILY
Qty: 90 TAB | Refills: 2 | Status: SHIPPED | OUTPATIENT
Start: 2020-03-09 | End: 2020-11-04 | Stop reason: SDUPTHER

## 2020-03-09 NOTE — PROGRESS NOTES
Mikki Bboby is a 53 y.o.  male and presents with Medication Refill; Hypertension; Sleep Apnea (on CPAP); and Obesity      SUBJECTIVE:    Cardiovascular Review:  The patient has hypertension and obesity. Diet and Lifestyle: not attempting to follow a low fat, low cholesterol diet, exercises sporadically  Home BP Monitoring: is poorly controlled at home. Pt says he is compliant with taking his BP meds    Pertinent ROS: taking medications as instructed, no medication side effects noted, no TIA's, no chest pain on exertion, no dyspnea on exertion, no swelling of ankles. Pt continues to struggle with losing weight. He has used Adipex in the past but he has not been able to keep the weight off. Contrave did not work for him. Pt was made aware of intermittent fasting and see if it is an option for him. Patient was diagnosed with sleep apnea and just started using a CPAP in the last week. Hopefully this will help with bringing his blood pressure down as well as helping him with weight loss since he will be able to get better sleep. Respiratory ROS: negative for - shortness of breath  Cardiovascular ROS: negative for - chest pain    Current Outpatient Medications   Medication Sig    phentermine (ADIPEX-P) 37.5 mg tablet Take 1 Tab by mouth every morning. Max Daily Amount: 37.5 mg.    amLODIPine-valsartan (EXFORGE)  mg per tablet Take 1 Tab by mouth daily.  triamcinolone acetonide (KENALOG) 0.1 % topical cream APPLY A DIME SIZED AMOUNT TO AFFECTED AREA TWICE A DAY FOR 1 WEEK AS NEEDED    ibuprofen (MOTRIN) 800 mg tablet Take 800 mg by mouth every eight (8) hours as needed.  ketoconazole (NIZORAL) 2 % shampoo Apply quarter size daily as needed    triamcinolone acetonide (KENALOG) 0.1 % topical cream Apply  to affected area two (2) times a day. Apply dime size to affected area daily for 1 week as needed     No current facility-administered medications for this visit. OBJECTIVE:  alert, well appearing, and in no distress  Visit Vitals  BP (!) 154/91 (BP 1 Location: Left arm, BP Patient Position: Sitting)   Pulse 81   Temp 97.9 °F (36.6 °C) (Oral)   Resp 20   Ht 6' 4\" (1.93 m)   Wt 303 lb (137.4 kg)   SpO2 98%   BMI 36.88 kg/m²      well developed and well nourished  Chest - clear to auscultation, no wheezes, rales or rhonchi, symmetric air entry  Heart - normal rate, regular rhythm, normal S1, S2, no murmurs, rubs, clicks or gallops  Extremities - no edema     Labs:   Lab Results   Component Value Date/Time    Cholesterol, total 187 03/09/2020 04:03 AM    HDL Cholesterol 37 (L) 03/09/2020 04:03 AM    LDL, calculated 111 (H) 03/09/2020 04:03 AM    Triglyceride 195 (H) 03/09/2020 04:03 AM     Lab Results   Component Value Date/Time    Prostate Specific Ag 0.4 03/09/2020 04:03 AM    Prostate Specific Ag 0.6 03/29/2019 09:40 AM    Prostate Specific Ag 0.6 12/15/2017 08:24 AM     Lab Results   Component Value Date/Time    Sodium 142 03/09/2020 04:03 AM    Potassium 4.0 03/09/2020 04:03 AM    Chloride 105 03/09/2020 04:03 AM    CO2 22 03/09/2020 04:03 AM    Glucose 86 03/09/2020 04:03 AM    BUN 13 03/09/2020 04:03 AM    Creatinine 1.01 03/09/2020 04:03 AM    BUN/Creatinine ratio 13 03/09/2020 04:03 AM    GFR est  03/09/2020 04:03 AM    GFR est non-AA 89 03/09/2020 04:03 AM    Calcium 9.3 03/09/2020 04:03 AM    Bilirubin, total 0.4 03/09/2020 04:03 AM    ALT (SGPT) 28 03/09/2020 04:03 AM    AST (SGOT) 19 03/09/2020 04:03 AM    Alk. phosphatase 63 03/09/2020 04:03 AM    Protein, total 7.3 03/09/2020 04:03 AM    Albumin 4.5 03/09/2020 04:03 AM    A-G Ratio 1.6 03/09/2020 04:03 AM          Discussed the patient's BMI with him. The BMI follow up plan is as follows: BMI is out of normal parameters and plan is as follows: I have counseled this patient on diet and exercise regimens. Assessment/Plan      ICD-10-CM ICD-9-CM    1.  Essential hypertension I10 401.9 Uncontrolled on Exforge 10/320. Pt will work on weight loss to improve further and hopefully treating his sleep apnea will also help control his BP METABOLIC PANEL, COMPREHENSIVE      LIPID PANEL      MICROALBUMIN, UR, RAND W/ MICROALB/CREAT RATIO   2. Class 2 severe obesity due to excess calories with serious comorbidity and body mass index (BMI) of 36.0 to 36.9 in adult [facility]) E66.01 278.01 Pt will continue to work on diet and exercise  along with phentermine (ADIPEX-P) 37.5 mg tablet to lose weight     Z68.36 V85.36    3. Obstructive sleep apnea syndrome G47.33 327.23  patient just started using CPAP for his sleep apnea and will follow-up with a sleep specialist   4. Prostate cancer screening Z12.5 V76.44 PSA, DIAGNOSTIC (PROSTATE SPECIFIC AG)         Follow-up and Dispositions    · Return in about 3 months (around 6/9/2020) for BP check. Reviewed plan of care. Patient has provided input and agrees with goals.

## 2020-03-09 NOTE — PATIENT INSTRUCTIONS
High Blood Pressure: Care Instructions Overview It's normal for blood pressure to go up and down throughout the day. But if it stays up, you have high blood pressure. Another name for high blood pressure is hypertension. Despite what a lot of people think, high blood pressure usually doesn't cause headaches or make you feel dizzy or lightheaded. It usually has no symptoms. But it does increase your risk of stroke, heart attack, and other problems. You and your doctor will talk about your risks of these problems based on your blood pressure. Your doctor will give you a goal for your blood pressure. Your goal will be based on your health and your age. Lifestyle changes, such as eating healthy and being active, are always important to help lower blood pressure. You might also take medicine to reach your blood pressure goal. 
Follow-up care is a key part of your treatment and safety. Be sure to make and go to all appointments, and call your doctor if you are having problems. It's also a good idea to know your test results and keep a list of the medicines you take. How can you care for yourself at home? Medical treatment · If you stop taking your medicine, your blood pressure will go back up. You may take one or more types of medicine to lower your blood pressure. Be safe with medicines. Take your medicine exactly as prescribed. Call your doctor if you think you are having a problem with your medicine. · Talk to your doctor before you start taking aspirin every day. Aspirin can help certain people lower their risk of a heart attack or stroke. But taking aspirin isn't right for everyone, because it can cause serious bleeding. · See your doctor regularly. You may need to see the doctor more often at first or until your blood pressure comes down. · If you are taking blood pressure medicine, talk to your doctor before you take decongestants or anti-inflammatory medicine, such as ibuprofen. Some of these medicines can raise blood pressure. · Learn how to check your blood pressure at home. Lifestyle changes · Stay at a healthy weight. This is especially important if you put on weight around the waist. Losing even 10 pounds can help you lower your blood pressure. · If your doctor recommends it, get more exercise. Walking is a good choice. Bit by bit, increase the amount you walk every day. Try for at least 30 minutes on most days of the week. You also may want to swim, bike, or do other activities. · Avoid or limit alcohol. Talk to your doctor about whether you can drink any alcohol. · Try to limit how much sodium you eat to less than 2,300 milligrams (mg) a day. Your doctor may ask you to try to eat less than 1,500 mg a day. · Eat plenty of fruits (such as bananas and oranges), vegetables, legumes, whole grains, and low-fat dairy products. · Lower the amount of saturated fat in your diet. Saturated fat is found in animal products such as milk, cheese, and meat. Limiting these foods may help you lose weight and also lower your risk for heart disease. · Do not smoke. Smoking increases your risk for heart attack and stroke. If you need help quitting, talk to your doctor about stop-smoking programs and medicines. These can increase your chances of quitting for good. When should you call for help? Call  911 anytime you think you may need emergency care. This may mean having symptoms that suggest that your blood pressure is causing a serious heart or blood vessel problem. Your blood pressure may be over 180/120. 
 For example, call  911 if: 
  · You have symptoms of a heart attack. These may include: 
? Chest pain or pressure, or a strange feeling in the chest. 
? Sweating. ? Shortness of breath. ? Nausea or vomiting. ? Pain, pressure, or a strange feeling in the back, neck, jaw, or upper belly or in one or both shoulders or arms. ? Lightheadedness or sudden weakness. ? A fast or irregular heartbeat.  
  · You have symptoms of a stroke. These may include: 
? Sudden numbness, tingling, weakness, or loss of movement in your face, arm, or leg, especially on only one side of your body. ? Sudden vision changes. ? Sudden trouble speaking. ? Sudden confusion or trouble understanding simple statements. ? Sudden problems with walking or balance. ? A sudden, severe headache that is different from past headaches.  
  · You have severe back or belly pain.  
 Do not wait until your blood pressure comes down on its own. Get help right away. 
 Call your doctor now or seek immediate care if: 
  · Your blood pressure is much higher than normal (such as 180/120 or higher), but you don't have symptoms.  
  · You think high blood pressure is causing symptoms, such as: 
? Severe headache. 
? Blurry vision.  
 Watch closely for changes in your health, and be sure to contact your doctor if: 
  · Your blood pressure measures higher than your doctor recommends at least 2 times. That means the top number is higher or the bottom number is higher, or both.  
  · You think you may be having side effects from your blood pressure medicine. Where can you learn more? Go to http://omar-ricci.info/. Enter S287 in the search box to learn more about \"High Blood Pressure: Care Instructions. \" Current as of: April 9, 2019 Content Version: 12.2 © 9116-2571 [employer]. Care instructions adapted under license by [employer] ([place]) (which disclaims liability or warranty for this information). If you have questions about a medical condition or this instruction, always ask your healthcare professional. [place] [address] any warranty or liability for your use of this information.

## 2020-03-09 NOTE — PROGRESS NOTES
Patient is in the office today for a medication refill. Patient states he has bilateral knee pain 3/10 and right hip pain 5/10. Patient is requesting a prescription for Phentermine. 1. Have you been to the ER, urgent care clinic since your last visit? Hospitalized since your last visit? No    2. Have you seen or consulted any other health care providers outside of the [facility] since your last visit? Include any pap smears or colon screening.  No

## 2020-03-11 LAB
ALBUMIN SERPL-MCNC: 4.5 G/DL (ref 4–5)
ALBUMIN/CREAT UR: 7 MG/G CREAT (ref 0–29)
ALBUMIN/GLOB SERPL: 1.6 {RATIO} (ref 1.2–2.2)
ALP SERPL-CCNC: 63 IU/L (ref 39–117)
ALT SERPL-CCNC: 28 IU/L (ref 0–44)
AST SERPL-CCNC: 19 IU/L (ref 0–40)
BILIRUB SERPL-MCNC: 0.4 MG/DL (ref 0–1.2)
BUN SERPL-MCNC: 13 MG/DL (ref 6–24)
BUN/CREAT SERPL: 13 (ref 9–20)
CALCIUM SERPL-MCNC: 9.3 MG/DL (ref 8.7–10.2)
CHLORIDE SERPL-SCNC: 105 MMOL/L (ref 96–106)
CHOLEST SERPL-MCNC: 187 MG/DL (ref 100–199)
CO2 SERPL-SCNC: 22 MMOL/L (ref 20–29)
CREAT SERPL-MCNC: 1.01 MG/DL (ref 0.76–1.27)
CREAT UR-MCNC: 287.7 MG/DL
GLOBULIN SER CALC-MCNC: 2.8 G/DL (ref 1.5–4.5)
GLUCOSE SERPL-MCNC: 86 MG/DL (ref 65–99)
HDLC SERPL-MCNC: 37 MG/DL
LDLC SERPL CALC-MCNC: 111 MG/DL (ref 0–99)
MICROALBUMIN UR-MCNC: 21 UG/ML
POTASSIUM SERPL-SCNC: 4 MMOL/L (ref 3.5–5.2)
PROT SERPL-MCNC: 7.3 G/DL (ref 6–8.5)
PSA SERPL-MCNC: 0.4 NG/ML (ref 0–4)
SODIUM SERPL-SCNC: 142 MMOL/L (ref 134–144)
TRIGL SERPL-MCNC: 195 MG/DL (ref 0–149)
VLDLC SERPL CALC-MCNC: 39 MG/DL (ref 5–40)

## 2020-05-05 ENCOUNTER — VIRTUAL VISIT (OUTPATIENT)
Dept: PULMONOLOGY | Age: 45
End: 2020-05-05

## 2020-05-05 DIAGNOSIS — I10 ESSENTIAL HYPERTENSION: ICD-10-CM

## 2020-05-05 DIAGNOSIS — G47.33 OSA ON CPAP: Primary | ICD-10-CM

## 2020-05-05 DIAGNOSIS — Z99.89 OSA ON CPAP: Primary | ICD-10-CM

## 2020-05-05 DIAGNOSIS — E66.9 OBESITY (BMI 30-39.9): ICD-10-CM

## 2020-05-05 NOTE — PROGRESS NOTES
The pt. States he notes that awakens a lot less at night with the CPap use. He states that he notes a slight leak of air around his nose piece sides. Sleepiness during the day has also subsided. He doesn't have a POX and doesn't weight himself. Flowsheets are completed.

## 2020-05-05 NOTE — PROGRESS NOTES
Consent: Heri Mccullough, who was seen by synchronous (real-time) audio-video technology, and/or his healthcare decision maker, is aware that this patient-initiated, Telehealth encounter on 5/5/2020 is a billable service, with coverage as determined by his insurance carrier. He is aware that he may receive a bill and has provided verbal consent to proceed: Yes. Assessment & Plan:      Assessment:  1. Obstructive Sleep Apnea (CATHY): Severe (AHI: 31.2)- Currently receiving clinical benefit and has excellent compliance. AHI is a little high and is more likely due to the P-low on APAP  2. Excessive Daytime Sleepiness (EDS)  3. Hypertension  4. Obesity: Body mass index is 36.69 kg/m². Plan:   Continue APAP but increase from 8/20 to 10/20 cwp and monitor for response. Patient encouraged to call our office if he has any difficulties with new pressure changes   COVID-19: general and PAP precautions discussed- Patient should use PAP device in separate room away from other individuals until we have more information about COVID-19   Avoid Motrin for now if possible   Proper PAP hygiene and compliance requirements reviewed and discussed   Healthy weight and sleep habits encouraged- Continue with bike riding    safety reviewed   Follow up with Primary Care Provider (PCP) as directed and for routine health care maintenance.  Patient is encouraged to contact our clinic if patient is experiencing in difficulties with using PAP device.  Follow up in our clinic in 6 months; sooner if needed.  Further recommendations pending clinical course.             I spent at least 10 minutes with this established patient, and >50% of the time was spent counseling and/or coordinating care regarding CATHY and PAP therapy     Total Video Time: 13:50  172  Subjective:   Heri Mccullough is a 53 y.o. male who was seen for Sleep Apnea (F/U to 10/31)    · Since last visit, the patient underwent home sleep testing (HST) via [facility]. He was noted to have severe CATHY. The patient was subsequently started on APAP therapy at 8/20. · Overall, the patient reports that he is very happy with therapy. He does not get up to use the bathroom several times per night as he deed prior to starting PAP therapy. His sleep is more consolidated and he feels more rested and/or refreshed during the day. · No Skin irritation from the mask  · No bloating  · He is happy with support from DME  · He cleans his device by hand  · He is going to work but today is his day off  · He awakens to start his day at 0630  · He starting riding a bike around the neighborhood for exercise  · He reports wearing a mask in public, Hand hygiene reviewed  · I discussed CPAP use and concern for potential aerosols in regards to COVID-10 19  · He is taking PRN Motrin.   I discussed possible concern for Motrin use for COVID-19 but that there is no definitive data on the use of Motrin and COVID-19     Positive Airway Pressure (PAP) Compliance  Report & Summary Trends  DME: [facility]    Date >4 hr use % Time  (Total Time%) AHI PAP Rx Pressure @ 90% Average Use Time Large Leak  Time Notes   2/28/20-5/2/20 89% 5.8 APAP 8/20 11.5 06:16:26 00:00:00                                    Sleep Testing:  - HST: 12/3/19: Novasom: 1/22/20: Hypopnea-3%: 3737, Hypopnea-4%: 31.2    Tasley Scale 5/5/2020 10/31/2019   Sitting and Reading 1 1   Watching TV 1 3   Sitting, inactive in a public place (e.g. a movie theater or meeting) 0 1   As a passenger in a car for an hour, without a break 1 1   Lying down to rest in the afternoon, when circumstances permit 2 3   Sitting and talking to someone 0 1   Sitting quietly after lunch without alcohol 0 2   In a car, while stopped for a few minutes in traffic 0 0   Tasley Sleepiness Score 5 12     3 most recent PHQ Screens 5/5/2020   Little interest or pleasure in doing things Not at all   Feeling down, depressed, irritable, or hopeless Not at all   Total Score PHQ 2 0         Prior to Admission medications    Medication Sig Start Date End Date Taking? Authorizing Provider   cpap machine kit by Does Not Apply route nightly. [address]   Yes Provider, Historical   amLODIPine-valsartan (EXFORGE)  mg per tablet Take 1 Tab by mouth daily. 3/9/20  Yes Elle Zarate MD   triamcinolone acetonide (KENALOG) 0.1 % topical cream APPLY A DIME SIZED AMOUNT TO AFFECTED AREA TWICE A DAY FOR 1 WEEK AS NEEDED 11/9/19  Yes Elle Zarate MD   ibuprofen (MOTRIN) 800 mg tablet Take 800 mg by mouth every eight (8) hours as needed. 10/22/10  Yes Provider, Historical   ketoconazole (NIZORAL) 2 % shampoo Apply quarter size daily as needed 6/20/17  Yes Elle Zarate MD   triamcinolone acetonide (KENALOG) 0.1 % topical cream Apply  to affected area two (2) times a day. Apply dime size to affected area daily for 1 week as needed 6/20/17  Yes Elle Zarate MD   phentermine (ADIPEX-P) 37.5 mg tablet Take 1 Tab by mouth every morning. Max Daily Amount: 37.5 mg. 3/9/20   Jennifer Schulte MD     No Known Allergies    No Known Allergies  Past Medical History:   Diagnosis Date    ED (erectile dysfunction)     Hypertension     Obesity 5/25/2010    Sinuitis      Past Surgical History:   Procedure Laterality Date    HX APPENDECTOMY  2005     Family History   Problem Relation Age of Onset    Hypertension Father      Social History     Tobacco Use    Smoking status: Never Smoker    Smokeless tobacco: Never Used   Substance Use Topics    Alcohol use: Yes     Comment: rarely 1 q wk       ROS- Per HPI and Arthralgias from chronic and distant injuries        Objective:   Vital Signs: (As obtained by patient/caregiver at home)  There were no vitals taken for this visit.           Constitutional: [x] Appears well-developed and well-nourished [x] No apparent distress , wearing glasses      Mental status: [x] Alert and awake  [x] Oriented to person/place/time [x] Able to follow commands    [] Abnormal -     Eyes:   EOM    [x]  Normal       Sclera  [x]  Normal               Discharge [x]  None visible        HENT: [x] Normocephalic, atraumatic      [x] Mouth/Throat: Mucous membranes are moist    External Ears [x] Normal       Neck: [x] No visualized mass [] Abnormal -     Pulmonary/Chest: [x] Respiratory effort normal   [x] No visualized signs of difficulty breathing or respiratory distress- no audible stridor or wheezing, no visual restractions, breathing appears non- labored            Musculoskeletal:           [x] Normal range of motion of neck             Neurological:        [x] No Facial Asymmetry (Cranial nerve 7 motor function) (limited exam due to video visit)          [x] No gaze palsy              Skin:        [x] No significant exanthematous lesions or discoloration noted on facial skin       Psychiatric:       [x] Normal Affect  - engages in appropriate conversation and appears with normal thought content                 We discussed the expected course, resolution and complications of the diagnosis(es) in detail. Medication risks, benefits, costs, interactions, and alternatives were discussed as indicated. I advised him to contact the office if his condition worsens, changes or fails to improve as anticipated. He expressed understanding with the diagnosis(es) and plan. Nichol Rainey is a 53 y.o. male being evaluated by a video visit encounter for concerns as above. A caregiver was present when appropriate. Due to this being a TeleHealth encounter (During CADUP-56 public health emergency), evaluation of the following organ systems was limited: Vitals/Constitutional/EENT/Resp/CV/GI//MS/Neuro/Skin/Heme-Lymph-Imm.   Pursuant to the emergency declaration under the [facility]5 waiver authority and the [employer] General Act, this Virtual  Visit was conducted, with patient's (and/or legal guardian's) consent, to reduce the patient's risk of exposure to COVID-19 and provide necessary medical care. Services were provided through a video synchronous discussion virtually to substitute for in-person clinic visit.          Cuca Beach DO, [facility]    [facility]  Pulmonary, Critical Care, and Sleep Medicine

## 2020-08-28 ENCOUNTER — TELEPHONE (OUTPATIENT)
Dept: FAMILY MEDICINE CLINIC | Age: 45
End: 2020-08-28

## 2020-08-28 NOTE — TELEPHONE ENCOUNTER
Saul referral approved:    Referral # [id number]  Unlimited Visits x 1 year  Referral starts 8/7/2020  Expires 8/6/2021

## 2020-10-27 DIAGNOSIS — E66.01 CLASS 2 SEVERE OBESITY DUE TO EXCESS CALORIES WITH SERIOUS COMORBIDITY AND BODY MASS INDEX (BMI) OF 36.0 TO 36.9 IN ADULT (HCC): ICD-10-CM

## 2020-10-27 RX ORDER — PHENTERMINE HYDROCHLORIDE 37.5 MG/1
37.5 TABLET ORAL
Qty: 30 TAB | Refills: 0 | Status: SHIPPED | OUTPATIENT
Start: 2020-10-27 | End: 2021-03-04 | Stop reason: SDUPTHER

## 2020-10-27 NOTE — TELEPHONE ENCOUNTER
Last Visit: 3/9/2020 with MD Zarate  Next Appointment: 11/04/2020 with MD Zarate  Previous Refill Encounter(s): 3/9/2020 per MD Zarate #[id number]    Requested Prescriptions     Pending Prescriptions Disp Refills    phentermine (ADIPEX-P) 37.5 mg tablet 30 Tab 0     Sig: Take 1 Tab by mouth every morning.  Max Daily Amount: 37.5 mg.

## 2020-10-29 ENCOUNTER — TELEPHONE (OUTPATIENT)
Dept: INTERNAL MEDICINE CLINIC | Age: 45
End: 2020-10-29

## 2020-10-29 DIAGNOSIS — R21 SKIN RASH: Primary | ICD-10-CM

## 2020-10-29 NOTE — TELEPHONE ENCOUNTER
----- Message from Cayetano Pearson sent at 10/27/2020 11:24 AM EDT -----  Regarding: referral  Pt needs a referral for keyon

## 2020-10-29 NOTE — TELEPHONE ENCOUNTER
Pt request to be referred to dermatology for dry patches on face off and on for a few months.      Stated Dr Felipe Landa had prescribed a topical cream but it hasn't helped and he has a new patch that has appeared     Stated he would like to be referred to:     Dr Pillo Cunningham, NPI [id number]   [facility]   [address], [address] [place] [address] [phone]  Fax [phone]

## 2020-11-04 ENCOUNTER — OFFICE VISIT (OUTPATIENT)
Dept: INTERNAL MEDICINE CLINIC | Age: 45
End: 2020-11-04
Payer: COMMERCIAL

## 2020-11-04 VITALS
HEIGHT: 75 IN | TEMPERATURE: 97.9 F | DIASTOLIC BLOOD PRESSURE: 94 MMHG | RESPIRATION RATE: 14 BRPM | OXYGEN SATURATION: 98 % | BODY MASS INDEX: 36.48 KG/M2 | SYSTOLIC BLOOD PRESSURE: 152 MMHG | WEIGHT: 293.4 LBS | HEART RATE: 83 BPM

## 2020-11-04 DIAGNOSIS — Z12.5 PROSTATE CANCER SCREENING: ICD-10-CM

## 2020-11-04 DIAGNOSIS — E66.01 CLASS 2 SEVERE OBESITY DUE TO EXCESS CALORIES WITH SERIOUS COMORBIDITY AND BODY MASS INDEX (BMI) OF 36.0 TO 36.9 IN ADULT (HCC): ICD-10-CM

## 2020-11-04 DIAGNOSIS — M25.552 BILATERAL HIP PAIN: ICD-10-CM

## 2020-11-04 DIAGNOSIS — I10 ESSENTIAL HYPERTENSION: Primary | ICD-10-CM

## 2020-11-04 DIAGNOSIS — B36.0 TINEA VERSICOLOR: ICD-10-CM

## 2020-11-04 DIAGNOSIS — M25.551 BILATERAL HIP PAIN: ICD-10-CM

## 2020-11-04 PROCEDURE — 99214 OFFICE O/P EST MOD 30 MIN: CPT | Performed by: INTERNAL MEDICINE

## 2020-11-04 RX ORDER — AMLODIPINE AND VALSARTAN 10; 320 MG/1; MG/1
1 TABLET ORAL DAILY
Qty: 90 TAB | Refills: 2 | Status: SHIPPED | OUTPATIENT
Start: 2020-11-04 | End: 2020-12-15

## 2020-11-04 RX ORDER — KETOCONAZOLE 20 MG/ML
SHAMPOO TOPICAL
Qty: 120 ML | Refills: 2 | Status: SHIPPED | OUTPATIENT
Start: 2020-11-04 | End: 2021-09-07 | Stop reason: SDUPTHER

## 2020-11-04 NOTE — PROGRESS NOTES
David Garcia is a 50 y.o.  male and presents with Medication Refill (Patient here for medication refills.); Hip Pain; Hypertension; and Obesity      SUBJECTIVE:    Cardiovascular Review:  The patient has hypertension and obesity. Diet and Lifestyle: not attempting to follow a low fat, low cholesterol diet, exercises sporadically  Home BP Monitoring: is poorly controlled at home with -150. Pt says he is compliant with taking his BP meds  Pertinent ROS: taking medications as instructed, no medication side effects noted, no TIA's, no chest pain on exertion, no dyspnea on exertion, no swelling of ankles. Pt continues to struggle with losing weight. He has used Adipex in the past but he has not been able to keep the weight off. Contrave did not work for him. Pt advised to cut back starches and sugar and increase protein in his diet to try and lose 25 -50 lbs. Patient was diagnosed with sleep apnea and should be using using a CPAP. Hip Pain  Patient complains of bilaterally hip pain. Onset of the symptoms was 2005 . 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 Current symptoms include bilateral hip pain. Severity = fairly severe  worse with weight bearing  aggravated by walking. Associated symptoms: none. Aggravating symptoms: weight gain. Patient's overall course: gradually worsening. Patient has had prior hip problems. Previous visits for this problem: none. Evaluation to date: none. Treatment to date: OTC analgesics PRN: not very effective. Respiratory ROS: negative for - shortness of breath  Cardiovascular ROS: negative for - chest pain    Current Outpatient Medications   Medication Sig    ketoconazole (NIZORAL) 2 % shampoo Apply quarter size daily as needed    amLODIPine-valsartan (EXFORGE)  mg per tablet Take 1 Tab by mouth daily.  phentermine (ADIPEX-P) 37.5 mg tablet Take 1 Tab by mouth every morning.  Max Daily Amount: 37.5 mg.    multivitamin (ONE A DAY) tablet Take 1 Tab by mouth daily.  cpap machine kit by Does Not Apply route nightly. [facility]    triamcinolone acetonide (KENALOG) 0.1 % topical cream APPLY A DIME SIZED AMOUNT TO AFFECTED AREA TWICE A DAY FOR 1 WEEK AS NEEDED    ibuprofen (MOTRIN) 800 mg tablet Take 800 mg by mouth every eight (8) hours as needed.  triamcinolone acetonide (KENALOG) 0.1 % topical cream Apply  to affected area two (2) times a day. Apply dime size to affected area daily for 1 week as needed     No current facility-administered medications for this visit.           OBJECTIVE:  alert, well appearing, and in no distress  Visit Vitals  BP (!) 152/94 (BP 1 Location: Left arm, BP Patient Position: Sitting)   Pulse 83   Temp 97.9 °F (36.6 °C) (Oral)   Resp 14   Ht 6' 3\" (1.905 m)   Wt 293 lb 6.4 oz (133.1 kg)   SpO2 98%   BMI 36.67 kg/m²      well developed and well nourished  Chest - clear to auscultation, no wheezes, rales or rhonchi, symmetric air entry  Heart - normal rate, regular rhythm, normal S1, S2, no murmurs, rubs, clicks or gallops  Extremities - no edema     Labs:   Lab Results   Component Value Date/Time    Cholesterol, total 187 03/09/2020 04:03 AM    HDL Cholesterol 37 (L) 03/09/2020 04:03 AM    LDL, calculated 111 (H) 03/09/2020 04:03 AM    Triglyceride 195 (H) 03/09/2020 04:03 AM     Lab Results   Component Value Date/Time    Prostate Specific Ag 0.4 03/09/2020 04:03 AM    Prostate Specific Ag 0.6 03/29/2019 09:40 AM    Prostate Specific Ag 0.6 12/15/2017 08:24 AM     Lab Results   Component Value Date/Time    Sodium 142 03/09/2020 04:03 AM    Potassium 4.0 03/09/2020 04:03 AM    Chloride 105 03/09/2020 04:03 AM    CO2 22 03/09/2020 04:03 AM    Glucose 86 03/09/2020 04:03 AM    BUN 13 03/09/2020 04:03 AM    Creatinine 1.01 03/09/2020 04:03 AM    BUN/Creatinine ratio 13 03/09/2020 04:03 AM    GFR est  03/09/2020 04:03 AM    GFR est non-AA 89 03/09/2020 04:03 AM    Calcium 9.3 03/09/2020 04:03 AM Bilirubin, total 0.4 03/09/2020 04:03 AM    ALT (SGPT) 28 03/09/2020 04:03 AM    Alk. phosphatase 63 03/09/2020 04:03 AM    Protein, total 7.3 03/09/2020 04:03 AM    Albumin 4.5 03/09/2020 04:03 AM    A-G Ratio 1.6 03/09/2020 04:03 AM          Discussed the patient's BMI with him. The BMI follow up plan is as follows: BMI is out of normal parameters and plan is as follows: I have counseled this patient on diet and exercise regimens. Assessment/Plan      ICD-10-CM ICD-9-CM    1. Essential hypertension  I10 401.9 Uncontrolled on Exforge 10/320. If unable to improve with weight loss would add aldactone. LIPID PANEL      METABOLIC PANEL, COMPREHENSIVE   2. Class 2 severe obesity due to excess calories with serious comorbidity and body mass index (BMI) of 36.0 to 36.9 in adult [facility])  E66.01 278.01 Pt will continue to work on cutting back starches and sweets to lose weight     Z68.36 V85.36    3. Bilateral hip pain  M25.551 719.45 REFERRAL TO ORTHOPEDIC SURGERY for further evaluation     M25.552     4. Tinea versicolor  B36.0 111.0 ketoconazole (NIZORAL) 2 % shampoo   5. Prostate cancer screening  Z12.5 V76.44 PSA, DIAGNOSTIC (PROSTATE SPECIFIC AG)         Follow-up and Dispositions    · Return in about 4 months (around 3/4/2021) for labs 1 week before. Reviewed plan of care. Patient has provided input and agrees with goals.

## 2020-12-15 RX ORDER — AMLODIPINE AND VALSARTAN 10; 320 MG/1; MG/1
TABLET ORAL
Qty: 90 TAB | Refills: 2 | Status: SHIPPED | OUTPATIENT
Start: 2020-12-15 | End: 2021-03-04 | Stop reason: SDUPTHER

## 2021-02-25 ENCOUNTER — APPOINTMENT (OUTPATIENT)
Dept: INTERNAL MEDICINE CLINIC | Age: 46
End: 2021-02-25

## 2021-02-25 DIAGNOSIS — I10 ESSENTIAL HYPERTENSION: ICD-10-CM

## 2021-02-26 LAB
A-G RATIO,AGRAT: 1.6 RATIO (ref 1.1–2.6)
ALBUMIN SERPL-MCNC: 4.5 G/DL (ref 3.5–5)
ALP SERPL-CCNC: 67 U/L (ref 25–115)
ALT SERPL-CCNC: 27 U/L (ref 5–40)
ANION GAP SERPL CALC-SCNC: 10 MMOL/L (ref 3–15)
AST SERPL W P-5'-P-CCNC: 17 U/L (ref 10–37)
BILIRUB SERPL-MCNC: 0.4 MG/DL (ref 0.2–1.2)
BUN SERPL-MCNC: 12 MG/DL (ref 6–22)
CALCIUM SERPL-MCNC: 9.6 MG/DL (ref 8.4–10.5)
CHLORIDE SERPL-SCNC: 105 MMOL/L (ref 98–110)
CHOLEST SERPL-MCNC: 186 MG/DL (ref 110–200)
CO2 SERPL-SCNC: 26 MMOL/L (ref 20–32)
CREAT SERPL-MCNC: 1 MG/DL (ref 0.5–1.2)
CREATININE, URINE: 142 MG/DL
GFRAA, 66117: >60
GFRNA, 66118: >60
GLOBULIN,GLOB: 2.9 G/DL (ref 2–4)
GLUCOSE SERPL-MCNC: 93 MG/DL (ref 70–99)
HDLC SERPL-MCNC: 4.3 MG/DL (ref 0–5)
HDLC SERPL-MCNC: 43 MG/DL
LDL/HDL RATIO,LDHD: 2.9
LDLC SERPL CALC-MCNC: 124 MG/DL (ref 50–99)
MICROALB/CREAT RATIO, 140286: 14.8 (ref 0–30)
MICROALBUMIN,URINE RANDOM 140054: 21 MG/L (ref 0.1–17)
NON-HDL CHOLESTEROL, 011976: 143 MG/DL
POTASSIUM SERPL-SCNC: 4.3 MMOL/L (ref 3.5–5.5)
PROT SERPL-MCNC: 7.4 G/DL (ref 6.4–8.3)
SODIUM SERPL-SCNC: 141 MMOL/L (ref 133–145)
TRIGL SERPL-MCNC: 96 MG/DL (ref 40–149)
VLDLC SERPL CALC-MCNC: 19 MG/DL (ref 8–30)

## 2021-03-04 ENCOUNTER — OFFICE VISIT (OUTPATIENT)
Dept: INTERNAL MEDICINE CLINIC | Age: 46
End: 2021-03-04
Payer: COMMERCIAL

## 2021-03-04 VITALS
WEIGHT: 295 LBS | RESPIRATION RATE: 20 BRPM | DIASTOLIC BLOOD PRESSURE: 95 MMHG | SYSTOLIC BLOOD PRESSURE: 142 MMHG | BODY MASS INDEX: 36.68 KG/M2 | HEART RATE: 77 BPM | TEMPERATURE: 97.8 F | HEIGHT: 75 IN | OXYGEN SATURATION: 98 %

## 2021-03-04 DIAGNOSIS — E78.9 BORDERLINE HIGH CHOLESTEROL: ICD-10-CM

## 2021-03-04 DIAGNOSIS — E66.01 CLASS 2 SEVERE OBESITY DUE TO EXCESS CALORIES WITH SERIOUS COMORBIDITY AND BODY MASS INDEX (BMI) OF 36.0 TO 36.9 IN ADULT (HCC): ICD-10-CM

## 2021-03-04 DIAGNOSIS — I10 ESSENTIAL HYPERTENSION: Primary | ICD-10-CM

## 2021-03-04 DIAGNOSIS — I10 ESSENTIAL HYPERTENSION: ICD-10-CM

## 2021-03-04 DIAGNOSIS — Z12.5 PROSTATE CANCER SCREENING: ICD-10-CM

## 2021-03-04 DIAGNOSIS — M25.551 RIGHT HIP PAIN: ICD-10-CM

## 2021-03-04 PROCEDURE — 99214 OFFICE O/P EST MOD 30 MIN: CPT | Performed by: INTERNAL MEDICINE

## 2021-03-04 RX ORDER — AMLODIPINE AND VALSARTAN 10; 320 MG/1; MG/1
TABLET ORAL
Qty: 90 TAB | Refills: 2 | Status: SHIPPED | OUTPATIENT
Start: 2021-03-04 | End: 2021-11-17 | Stop reason: SDUPTHER

## 2021-03-04 RX ORDER — PHENTERMINE HYDROCHLORIDE 37.5 MG/1
37.5 TABLET ORAL
Qty: 30 TAB | Refills: 0 | Status: SHIPPED | OUTPATIENT
Start: 2021-03-04 | End: 2021-06-08

## 2021-03-04 NOTE — PATIENT INSTRUCTIONS
High Blood Pressure: Care Instructions Overview It's normal for blood pressure to go up and down throughout the day. But if it stays up, you have high blood pressure. Another name for high blood pressure is hypertension. Despite what a lot of people think, high blood pressure usually doesn't cause headaches or make you feel dizzy or lightheaded. It usually has no symptoms. But it does increase your risk of stroke, heart attack, and other problems. You and your doctor will talk about your risks of these problems based on your blood pressure. Your doctor will give you a goal for your blood pressure. Your goal will be based on your health and your age. Lifestyle changes, such as eating healthy and being active, are always important to help lower blood pressure. You might also take medicine to reach your blood pressure goal. 
Follow-up care is a key part of your treatment and safety. Be sure to make and go to all appointments, and call your doctor if you are having problems. It's also a good idea to know your test results and keep a list of the medicines you take. How can you care for yourself at home? Medical treatment · If you stop taking your medicine, your blood pressure will go back up. You may take one or more types of medicine to lower your blood pressure. Be safe with medicines. Take your medicine exactly as prescribed. Call your doctor if you think you are having a problem with your medicine. · Talk to your doctor before you start taking aspirin every day. Aspirin can help certain people lower their risk of a heart attack or stroke. But taking aspirin isn't right for everyone, because it can cause serious bleeding. · See your doctor regularly. You may need to see the doctor more often at first or until your blood pressure comes down. · If you are taking blood pressure medicine, talk to your doctor before you take decongestants or anti-inflammatory medicine, such as ibuprofen.  Some of these medicines can raise blood pressure. · Learn how to check your blood pressure at home. Lifestyle changes · Stay at a healthy weight. This is especially important if you put on weight around the waist. Losing even 10 pounds can help you lower your blood pressure. · If your doctor recommends it, get more exercise. Walking is a good choice. Bit by bit, increase the amount you walk every day. Try for at least 30 minutes on most days of the week. You also may want to swim, bike, or do other activities. · Avoid or limit alcohol. Talk to your doctor about whether you can drink any alcohol. · Try to limit how much sodium you eat to less than 2,300 milligrams (mg) a day. Your doctor may ask you to try to eat less than 1,500 mg a day. · Eat plenty of fruits (such as bananas and oranges), vegetables, legumes, whole grains, and low-fat dairy products. · Lower the amount of saturated fat in your diet. Saturated fat is found in animal products such as milk, cheese, and meat. Limiting these foods may help you lose weight and also lower your risk for heart disease. · Do not smoke. Smoking increases your risk for heart attack and stroke. If you need help quitting, talk to your doctor about stop-smoking programs and medicines. These can increase your chances of quitting for good. When should you call for help? Call  911 anytime you think you may need emergency care. This may mean having symptoms that suggest that your blood pressure is causing a serious heart or blood vessel problem. Your blood pressure may be over 180/120. For example, call 911 if: 
  · You have symptoms of a heart attack. These may include: 
? Chest pain or pressure, or a strange feeling in the chest. 
? Sweating. ? Shortness of breath. ? Nausea or vomiting. ? Pain, pressure, or a strange feeling in the back, neck, jaw, or upper belly or in one or both shoulders or arms. ? Lightheadedness or sudden weakness.  
? A fast or irregular heartbeat.  
  · You have symptoms of a stroke. These may include: 
? Sudden numbness, tingling, weakness, or loss of movement in your face, arm, or leg, especially on only one side of your body. ? Sudden vision changes. ? Sudden trouble speaking. ? Sudden confusion or trouble understanding simple statements. ? Sudden problems with walking or balance. ? A sudden, severe headache that is different from past headaches.  
  · You have severe back or belly pain. Do not wait until your blood pressure comes down on its own. Get help right away. Call your doctor now or seek immediate care if: 
  · Your blood pressure is much higher than normal (such as 180/120 or higher), but you don't have symptoms.  
  · You think high blood pressure is causing symptoms, such as: 
? Severe headache. 
? Blurry vision. Watch closely for changes in your health, and be sure to contact your doctor if: 
  · Your blood pressure measures higher than your doctor recommends at least 2 times. That means the top number is higher or the bottom number is higher, or both.  
  · You think you may be having side effects from your blood pressure medicine. Where can you learn more? Go to http://www.gray.com/ Enter J902 in the search box to learn more about \"High Blood Pressure: Care Instructions. \" Current as of: December 16, 2019               Content Version: 12.6 © 8463-6615 [employer]. Care instructions adapted under license by [employer] (which disclaims liability or warranty for this information). If you have questions about a medical condition or this instruction, always ask your healthcare professional. [place] [address] any warranty or liability for your use of this information.

## 2021-03-04 NOTE — PROGRESS NOTES
Patient is in the office today for a 4 month follow up. Do you have an Advance Directive : no  Do you want more information : information given    1. Have you been to the ER, urgent care clinic since your last visit? Hospitalized since your last visit? No    2. Have you seen or consulted any other health care providers outside of the [address] [place] [address] since your last visit? Include any pap smears or colon screening.  No

## 2021-03-04 NOTE — PROGRESS NOTES
Nakia Brown is a 50 y.o.  male and presents with Hypertension, Cholesterol Problem, and Obesity      SUBJECTIVE:    Cardiovascular Review:  The patient has hypertension and obesity. Diet and Lifestyle: not attempting to follow a low fat, low cholesterol diet, exercises sporadically  Home BP Monitoring: is poorly controlled at home with -150. Pt says he is compliant with taking his BP meds  Pertinent ROS: taking medications as instructed, no medication side effects noted, no TIA's, no chest pain on exertion, no dyspnea on exertion, no swelling of ankles. Pt continues to struggle with losing weight. He has used Adipex in the past but he has not been able to keep the weight off. Contrave did not work for him. Pt advised to cut back starches and sugar and increase protein in his diet to try and lose 25 -50 lbs. Patient was diagnosed with sleep apnea and should be using using a CPAP. Hip Pain  Patient complains of bilaterally hip pain. Onset of the symptoms was 2005 . 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 Current symptoms include bilateral hip pain. Severity = fairly severe  worse with weight bearing  aggravated by walking. Associated symptoms: none. Aggravating symptoms: weight gain. Patient's overall course: gradually worsening. Patient has had prior hip problems. Previous visits for this problem: yes, last seen a few months ago by me. Evaluation to date: Patient being evaluated by [employer]. orthopedic surgeon       Respiratory ROS: negative for - shortness of breath  Cardiovascular ROS: negative for - chest pain    Current Outpatient Medications   Medication Sig    amLODIPine-valsartan (EXFORGE)  mg per tablet TAKE 1 TABLET BY MOUTH EVERY DAY    phentermine (ADIPEX-P) 37.5 mg tablet Take 1 Tab by mouth every morning.  Max Daily Amount: 37.5 mg.    ketoconazole (NIZORAL) 2 % shampoo Apply quarter size daily as needed    multivitamin (ONE A DAY) tablet Take 1 Tab by mouth daily.  cpap machine kit by Does Not Apply route nightly. [facility]    triamcinolone acetonide (KENALOG) 0.1 % topical cream APPLY A DIME SIZED AMOUNT TO AFFECTED AREA TWICE A DAY FOR 1 WEEK AS NEEDED    ibuprofen (MOTRIN) 800 mg tablet Take 800 mg by mouth every eight (8) hours as needed.  triamcinolone acetonide (KENALOG) 0.1 % topical cream Apply  to affected area two (2) times a day. Apply dime size to affected area daily for 1 week as needed     No current facility-administered medications for this visit.           OBJECTIVE:  alert, well appearing, and in no distress  Visit Vitals  BP (!) 142/95 (BP 1 Location: Left arm, BP Patient Position: Sitting, BP Cuff Size: Adult)   Pulse 77   Temp 97.8 °F (36.6 °C) (Temporal)   Resp 20   Ht 6' 3\" (1.905 m)   Wt 295 lb (133.8 kg)   SpO2 98%   BMI 36.87 kg/m²      well developed and well nourished  Chest - clear to auscultation, no wheezes, rales or rhonchi, symmetric air entry  Heart - normal rate, regular rhythm, normal S1, S2, no murmurs, rubs, clicks or gallops  Extremities - no edema     Labs:   Lab Results   Component Value Date/Time    Cholesterol, total 186 02/25/2021 09:22 AM    HDL Cholesterol 43 02/25/2021 09:22 AM    LDL, calculated 124 (H) 02/25/2021 09:22 AM    Triglyceride 96 02/25/2021 09:22 AM     Lab Results   Component Value Date/Time    Prostate Specific Ag 0.4 03/09/2020 04:03 AM    Prostate Specific Ag 0.6 03/29/2019 09:40 AM    Prostate Specific Ag 0.6 12/15/2017 08:24 AM     Lab Results   Component Value Date/Time    Sodium 141 02/25/2021 09:22 AM    Potassium 4.3 02/25/2021 09:22 AM    Chloride 105 02/25/2021 09:22 AM    CO2 26 02/25/2021 09:22 AM    Anion gap 10.0 02/25/2021 09:22 AM    Glucose 93 02/25/2021 09:22 AM    BUN 12 02/25/2021 09:22 AM    Creatinine 1.0 02/25/2021 09:22 AM    BUN/Creatinine ratio 13 03/09/2020 04:03 AM    GFR est  03/09/2020 04:03 AM    GFR est non-AA 89 03/09/2020 04:03 AM    Calcium 9.6 02/25/2021 09:22 AM    Bilirubin, total 0.4 02/25/2021 09:22 AM    ALT (SGPT) 27 02/25/2021 09:22 AM    Alk. phosphatase 67 02/25/2021 09:22 AM    Protein, total 7.4 02/25/2021 09:22 AM    Albumin 4.5 02/25/2021 09:22 AM    Globulin 2.9 02/25/2021 09:22 AM    A-G Ratio 1.6 02/25/2021 09:22 AM          Discussed the patient's BMI with him. The BMI follow up plan is as follows: BMI is out of normal parameters and plan is as follows: I have counseled this patient on diet and exercise regimens. Assessment/Plan      ICD-10-CM ICD-9-CM    1. Essential hypertension  I10 401.9  borderline controlled on Exforge 10/320. Patient will try monitor his blood pressure and if it is elevated at home would consider adding HCTZ. METABOLIC PANEL, COMPREHENSIVE   2. Borderline high cholesterol  E78.9 272.9  patient will continue to work on trying to improve this by cutting back starches and sweets in his diet LIPID PANEL   3. Right hip pain  M25.551 719.45  patient to follow-up with orthopedics through [employer]. 4. Class 2 severe obesity due to excess calories with serious comorbidity and body mass index (BMI) of 36.0 to 36.9 in adult [facility])  E66.01 278.01  will try phentermine (ADIPEX-P) 37.5 mg tablet along with diet and exercise    Z68.36 V85.36    5. Prostate cancer screening  Z12.5 V76.44 PSA, DIAGNOSTIC (PROSTATE SPECIFIC AG)         Follow-up and Dispositions    · Return in about 6 months (around 9/4/2021) for labs 1 week before. Reviewed plan of care. Patient has provided input and agrees with goals.

## 2021-05-25 ENCOUNTER — HOSPITAL ENCOUNTER (OUTPATIENT)
Dept: PREADMISSION TESTING | Age: 46
Discharge: HOME | End: 2021-05-25
Payer: COMMERCIAL

## 2021-05-25 ENCOUNTER — TRANSCRIBE ORDER (OUTPATIENT)
Dept: REGISTRATION | Age: 46
End: 2021-05-25

## 2021-05-25 ENCOUNTER — HOSPITAL ENCOUNTER (OUTPATIENT)
Dept: LAB | Age: 46
Discharge: HOME | End: 2021-05-25

## 2021-05-25 DIAGNOSIS — M16.11 PRIMARY OSTEOARTHRITIS OF RIGHT HIP: ICD-10-CM

## 2021-05-25 DIAGNOSIS — M16.11 PRIMARY OSTEOARTHRITIS OF RIGHT HIP: Primary | ICD-10-CM

## 2021-05-25 LAB
ATRIAL RATE: 73 BPM
CALCULATED P AXIS, ECG09: 54 DEGREES
CALCULATED R AXIS, ECG10: 81 DEGREES
CALCULATED T AXIS, ECG11: 39 DEGREES
DIAGNOSIS, 93000: NORMAL
P-R INTERVAL, ECG05: 148 MS
Q-T INTERVAL, ECG07: 400 MS
QRS DURATION, ECG06: 80 MS
QTC CALCULATION (BEZET), ECG08: 440 MS
SENTARA SPECIMEN COL,SENBCF: NORMAL
VENTRICULAR RATE, ECG03: 73 BPM

## 2021-05-25 PROCEDURE — 93005 ELECTROCARDIOGRAM TRACING: CPT

## 2021-05-25 PROCEDURE — 99001 SPECIMEN HANDLING PT-LAB: CPT

## 2021-06-07 NOTE — NURSE NAVIGATOR
Roxane Lara watched the joint seminar online and received a preoperative education booklet in anticipation of joint replacement surgery.      Orthopaedic Nurse Navigator

## 2021-06-09 ENCOUNTER — OFFICE VISIT (OUTPATIENT)
Dept: INTERNAL MEDICINE CLINIC | Age: 46
End: 2021-06-09
Payer: COMMERCIAL

## 2021-06-09 VITALS
BODY MASS INDEX: 35.19 KG/M2 | WEIGHT: 289 LBS | DIASTOLIC BLOOD PRESSURE: 96 MMHG | TEMPERATURE: 97.6 F | HEART RATE: 82 BPM | RESPIRATION RATE: 16 BRPM | OXYGEN SATURATION: 99 % | HEIGHT: 76 IN | SYSTOLIC BLOOD PRESSURE: 146 MMHG

## 2021-06-09 DIAGNOSIS — G47.33 OBSTRUCTIVE SLEEP APNEA SYNDROME: ICD-10-CM

## 2021-06-09 DIAGNOSIS — M16.11 PRIMARY OSTEOARTHRITIS OF RIGHT HIP: ICD-10-CM

## 2021-06-09 DIAGNOSIS — I10 ESSENTIAL HYPERTENSION: Primary | ICD-10-CM

## 2021-06-09 DIAGNOSIS — E66.01 CLASS 2 SEVERE OBESITY DUE TO EXCESS CALORIES WITH SERIOUS COMORBIDITY AND BODY MASS INDEX (BMI) OF 36.0 TO 36.9 IN ADULT (HCC): ICD-10-CM

## 2021-06-09 PROCEDURE — 99214 OFFICE O/P EST MOD 30 MIN: CPT | Performed by: INTERNAL MEDICINE

## 2021-06-09 RX ORDER — PHENTERMINE HYDROCHLORIDE 37.5 MG/1
37.5 TABLET ORAL
Qty: 30 TABLET | Refills: 0 | Status: SHIPPED | OUTPATIENT
Start: 2021-06-09 | End: 2021-08-20 | Stop reason: SDUPTHER

## 2021-06-09 RX ORDER — SPIRONOLACTONE 25 MG/1
25 TABLET ORAL DAILY
Qty: 90 TABLET | Refills: 2 | Status: SHIPPED | OUTPATIENT
Start: 2021-06-09 | End: 2022-01-11 | Stop reason: SDUPTHER

## 2021-06-09 NOTE — PROGRESS NOTES
Preoperative Evaluation    Date of Exam: 2021    Cristina Jiang is a 50 y.o. male (:1975) who presents for preoperative evaluation. Procedure/Surgery: Right total hip replacement anterior approach  Date of Procedure/Surgery: 2021  Surgeon: Dr. Vivienne Kim: [facility]    Primary Physician: Gabrielle Ortiz MD    HPI: Patient presents for medical clearance for surgery above. Patient has a history of hypertension that has been difficult to control because of his BMI of 35. He is currently on Exforge 10/320 and we will go ahead and add Aldactone 25 mg for better blood pressure control before surgery. Patient has sleep apnea and is using his CPAP on a regular basis. Patient otherwise is in good health. His preop testing was reviewed and he is medically cleared to proceed with surgery. Medical History:     Past Medical History:   Diagnosis Date    Arthritis     Right hip    ED (erectile dysfunction)     Hypertension     Obesity 2010    Sinuitis     Sleep apnea      Allergies:   No Known Allergies   Medications:     Current Outpatient Medications   Medication Sig    spironolactone (ALDACTONE) 25 mg tablet Take 1 Tablet by mouth daily.  phentermine (ADIPEX-P) 37.5 mg tablet Take 1 Tablet by mouth every morning. Max Daily Amount: 37.5 mg.    amLODIPine-valsartan (EXFORGE)  mg per tablet TAKE 1 TABLET BY MOUTH EVERY DAY    ketoconazole (NIZORAL) 2 % shampoo Apply quarter size daily as needed    multivitamin (ONE A DAY) tablet Take 1 Tab by mouth daily.  cpap machine kit by Does Not Apply route nightly. [facility]    triamcinolone acetonide (KENALOG) 0.1 % topical cream APPLY A DIME SIZED AMOUNT TO AFFECTED AREA TWICE A DAY FOR 1 WEEK AS NEEDED    ibuprofen (MOTRIN) 800 mg tablet Take 800 mg by mouth every eight (8) hours as needed.     triamcinolone acetonide (KENALOG) 0.1 % topical cream Apply  to affected area two (2) times a day. Apply dime size to affected area daily for 1 week as needed     No current facility-administered medications for this visit. Surgical History:     Past Surgical History:   Procedure Laterality Date    HX APPENDECTOMY  1987    HX ORTHOPAEDIC Right 1992    knee     Social History:     Social History     Socioeconomic History    Marital status:      Spouse name: Not on file    Number of children: Not on file    Years of education: Not on file    Highest education level: Not on file   Tobacco Use    Smoking status: Current Some Day Smoker    Smokeless tobacco: Never Used    Tobacco comment: cigar   Vaping Use    Vaping Use: Never used   Substance and Sexual Activity    Alcohol use: Yes     Comment: occasionally    Drug use: Never   Social History Narrative    ** Merged History Encounter **          Social Determinants of Health     Financial Resource Strain:     Difficulty of Paying Living Expenses:    Food Insecurity:     Worried About Running Out of Food in the Last Year:     Ran Out of Food in the Last Year:    Transportation Needs:     Lack of Transportation (Medical):      Lack of Transportation (Non-Medical):    Physical Activity:     Days of Exercise per Week:     Minutes of Exercise per Session:    Stress:     Feeling of Stress :    Social Connections:     Frequency of Communication with Friends and Family:     Frequency of Social Gatherings with Friends and Family:     Attends [employer] Services:     Active Member of Clubs or Organizations:     Attends Club or Organization Meetings:     Marital Status:        Recent use of: No recent use of aspirin (ASA), NSAIDS or steroids    Anesthesia Complications: None  History of abnormal bleeding : None      REVIEW OF SYSTEMS:  Respiratory: negative for dyspnea on exertion  Cardiovascular: negative for chest pain    EXAM:   Visit Vitals  BP (!) 146/96 (BP 1 Location: Left arm, BP Patient Position: Sitting, BP Cuff Size: Adult)   Pulse 82   Temp 97.6 °F (36.4 °C) (Temporal)   Resp 16   Ht 6' 4\" (1.93 m)   Wt 289 lb (131.1 kg)   SpO2 99%   BMI 35.18 kg/m²     Eyes - pupils equal and reactive, extraocular eye movements intact  Chest - clear to auscultation, no wheezes, rales or rhonchi, symmetric air entry  Heart - normal rate, regular rhythm, normal S1, S2, no murmurs, rubs, clicks or gallops  Extremities - peripheral pulses normal, no pedal edema, no clubbing or cyanosis      DIAGNOSTICS:   1. EKG: EKG FINDINGS - normal sinus rhythm, nonspecific ST and T waves changes  Results for orders placed or performed in visit on 05/25/21   MRSA SCREENING CULTURE   Result Value Ref Range    MRSA Screening Culture       No Methicillin Resistant Staphylococcus Aureus (MRSA) isolated   PROTHROMBIN TIME + INR   Result Value Ref Range    Prothrombin time 10.6 9.0 - 13.0 sec    INR 0.96 2.67 - 0.08   METABOLIC PANEL, COMPREHENSIVE   Result Value Ref Range    Glucose 97 70 - 99 mg/dL    BUN 17 6 - 22 mg/dL    Creatinine 0.9 0.5 - 1.2 mg/dL    Sodium 142 133 - 145 mmol/L    Potassium 4.2 3.5 - 5.5 mmol/L    Chloride 102 98 - 110 mmol/L    CO2 26 20 - 32 mmol/L    AST (SGOT) 19 10 - 37 U/L    ALT (SGPT) 29 5 - 40 U/L    Alk.  phosphatase 67 25 - 115 U/L    Bilirubin, total 0.5 0.2 - 1.2 mg/dL    Calcium 9.2 8.4 - 10.5 mg/dL    Protein, total 7.1 6.4 - 8.3 g/dL    Albumin 4.4 3.5 - 5.0 g/dL    A-G Ratio 1.6 1.1 - 2.6 ratio    Globulin 2.7 2.0 - 4.0 g/dL    Anion gap 14.5 3.0 - 15.0 mmol/L    GFRAA >60.0 >60.0    GFRNA >60.0 >60.0   CBC WITH AUTOMATED DIFF   Result Value Ref Range    WBC 4.6 4.0 - 11.0 K/uL    RBC 4.71 3.80 - 5.80 M/uL    HGB 13.1 13.1 - 17.2 g/dL    HCT 41.0 39.3 - 51.6 %    MCV 87 80 - 95 fL    MCH 28 26 - 34 pg    MCHC 32 31 - 36 g/dL    RDW 14.2 10.0 - 15.5 %    PLATELET 046 834 - 926 K/uL    MPV 11.5 9.0 - 13.0 fL    NEUTROPHILS 41 40 - 75 %    Lymphocytes 41 20 - 45 %    MONOCYTES 15 (H) 3 - 12 %    EOSINOPHILS 3 0 - 6 % BASOPHILS 1 0 - 2 %    ABS. NEUTROPHILS 1.9 1.8 - 7.7 K/uL    ABSOLUTE LYMPHOCYTE COUNT 1.9 1.0 - 4.8 K/uL    ABS. MONOCYTES 0.7 0.1 - 1.0 K/uL    ABS. EOSINOPHILS 0.1 0.0 - 0.5 K/uL    ABS. BASOPHILS 0.0 0.0 - 0.2 K/uL   HEMOGLOBIN A1C W/O EAG   Result Value Ref Range    Hemoglobin A1c 5.7 (H) 4.8 - 5.6 %    AVG  91 - 123 mg/dL   SED RATE (ESR)   Result Value Ref Range    Sed rate (ESR) 10 0 - 15 mm/hr   URINALYSIS W/ RFLX MICROSCOPIC   Result Value Ref Range    Color Yellow Straw, Lig    CLARITY Clear Clear, Sli    Specific Gravity 1.026 1.005 - 1.03    pH (UA) 6.0 5.0 - 8.0 pH    Protein Negative Negative, mg/dL    Glucose Negative Negative mg/dL    Ketone Negative Negative, mg/dL    Bilirubin Negative Negative    Blood Negative Negative    Nitrites Negative Negative    Leukocyte Esterase Negative Negative    Urobilinogen <2.0 <2.0 mg/dL    URINE ASCORBIC ACID Negative Negative mg/dL   PTT   Result Value Ref Range    aPTT 23 22 - 36 sec           IMPRESSION:       ICD-10-CM ICD-9-CM    1. Essential hypertension  I10 401.9  uncontrolled will add spironolactone (ALDACTONE) 25 mg tablet to Exforge 10/320. Patient to take both tabs a.m. of surgery with sip of water   2. Obstructive sleep apnea syndrome  G47.33 327.23  patient doing well on CPAP   3. Class 2 severe obesity due to excess calories with serious comorbidity and body mass index (BMI) of 36.0 to 36.9 in adult [facility])  E66.01 278.01  patient will continue to try and work on weight loss with cutting back starches and sweets in his diet and can add phentermine (ADIPEX-P) 37.5 mg tablet once he is recovered from his hip surgery. Z68.36 V85.36    4.  Primary osteoarthritis of right hip  M16.11 715.15  patient medically cleared for surgery above     Emilia Gilbert MD   6/9/2021

## 2021-06-09 NOTE — PROGRESS NOTES
Patient is in the office today for a pre op clearance with Dr. Manasa Nieves for a right hip replacement. 1. Have you been to the ER, urgent care clinic since your last visit? Hospitalized since your last visit? No    2. Have you seen or consulted any other health care providers outside of the [address] [place] [address] since your last visit? Include any pap smears or colon screening. yes, Dr. Manasa Nieves ortho .        Surgeon : Dr. Manasa Nieves  Procedure : Right hip replacement  Location : [facility]   Surgery Date: June 21,2021

## 2021-06-09 NOTE — PATIENT INSTRUCTIONS
High Blood Pressure: Care Instructions Overview It's normal for blood pressure to go up and down throughout the day. But if it stays up, you have high blood pressure. Another name for high blood pressure is hypertension. Despite what a lot of people think, high blood pressure usually doesn't cause headaches or make you feel dizzy or lightheaded. It usually has no symptoms. But it does increase your risk of stroke, heart attack, and other problems. You and your doctor will talk about your risks of these problems based on your blood pressure. Your doctor will give you a goal for your blood pressure. Your goal will be based on your health and your age. Lifestyle changes, such as eating healthy and being active, are always important to help lower blood pressure. You might also take medicine to reach your blood pressure goal. 
Follow-up care is a key part of your treatment and safety. Be sure to make and go to all appointments, and call your doctor if you are having problems. It's also a good idea to know your test results and keep a list of the medicines you take. How can you care for yourself at home? Medical treatment · If you stop taking your medicine, your blood pressure will go back up. You may take one or more types of medicine to lower your blood pressure. Be safe with medicines. Take your medicine exactly as prescribed. Call your doctor if you think you are having a problem with your medicine. · Talk to your doctor before you start taking aspirin every day. Aspirin can help certain people lower their risk of a heart attack or stroke. But taking aspirin isn't right for everyone, because it can cause serious bleeding. · See your doctor regularly. You may need to see the doctor more often at first or until your blood pressure comes down. · If you are taking blood pressure medicine, talk to your doctor before you take decongestants or anti-inflammatory medicine, such as ibuprofen.  Some of these medicines can raise blood pressure. · Learn how to check your blood pressure at home. Lifestyle changes · Stay at a healthy weight. This is especially important if you put on weight around the waist. Losing even 10 pounds can help you lower your blood pressure. · If your doctor recommends it, get more exercise. Walking is a good choice. Bit by bit, increase the amount you walk every day. Try for at least 30 minutes on most days of the week. You also may want to swim, bike, or do other activities. · Avoid or limit alcohol. Talk to your doctor about whether you can drink any alcohol. · Try to limit how much sodium you eat to less than 2,300 milligrams (mg) a day. Your doctor may ask you to try to eat less than 1,500 mg a day. · Eat plenty of fruits (such as bananas and oranges), vegetables, legumes, whole grains, and low-fat dairy products. · Lower the amount of saturated fat in your diet. Saturated fat is found in animal products such as milk, cheese, and meat. Limiting these foods may help you lose weight and also lower your risk for heart disease. · Do not smoke. Smoking increases your risk for heart attack and stroke. If you need help quitting, talk to your doctor about stop-smoking programs and medicines. These can increase your chances of quitting for good. When should you call for help? Call  911 anytime you think you may need emergency care. This may mean having symptoms that suggest that your blood pressure is causing a serious heart or blood vessel problem. Your blood pressure may be over 180/120. For example, call 911 if: 
  · You have symptoms of a heart attack. These may include: 
? Chest pain or pressure, or a strange feeling in the chest. 
? Sweating. ? Shortness of breath. ? Nausea or vomiting. ? Pain, pressure, or a strange feeling in the back, neck, jaw, or upper belly or in one or both shoulders or arms. ? Lightheadedness or sudden weakness.  
? A fast or irregular heartbeat.  
  · You have symptoms of a stroke. These may include: 
? Sudden numbness, tingling, weakness, or loss of movement in your face, arm, or leg, especially on only one side of your body. ? Sudden vision changes. ? Sudden trouble speaking. ? Sudden confusion or trouble understanding simple statements. ? Sudden problems with walking or balance. ? A sudden, severe headache that is different from past headaches.  
  · You have severe back or belly pain. Do not wait until your blood pressure comes down on its own. Get help right away. Call your doctor now or seek immediate care if: 
  · Your blood pressure is much higher than normal (such as 180/120 or higher), but you don't have symptoms.  
  · You think high blood pressure is causing symptoms, such as: 
? Severe headache. 
? Blurry vision. Watch closely for changes in your health, and be sure to contact your doctor if: 
  · Your blood pressure measures higher than your doctor recommends at least 2 times. That means the top number is higher or the bottom number is higher, or both.  
  · You think you may be having side effects from your blood pressure medicine. Where can you learn more? Go to http://www.gray.com/ Enter O234 in the search box to learn more about \"High Blood Pressure: Care Instructions. \" Current as of: August 31, 2020               Content Version: 12.8 © 2006-2021 [employer]. Care instructions adapted under license by [employer] (which disclaims liability or warranty for this information). If you have questions about a medical condition or this instruction, always ask your healthcare professional. [place] [address] any warranty or liability for your use of this information.

## 2021-06-19 ENCOUNTER — ANESTHESIA EVENT (OUTPATIENT)
Dept: SURGERY | Age: 46
End: 2021-06-19
Payer: COMMERCIAL

## 2021-06-20 PROBLEM — M16.11 OSTEOARTHRITIS OF RIGHT HIP: Chronic | Status: ACTIVE | Noted: 2021-06-20

## 2021-06-20 NOTE — H&P
[address] [facility] [address]  History and Physical Exam    Patient: Danette Forbes MRN: [id number]  SSN: [id number]    YOB: 1975  Age: 50 y.o. Sex: male      Subjective:      Chief Complaint: right hip pain    History of Present Illness:  Patient complains of right hip pain and difficulty ambulating, which has progressed over the past several months. X-rays showed osteoarthritis of the joint. The patient's pain has persisted and progressed despite conservative treatments and therapies. The patient has been previously treated with medications and/or injections. The patient has at this time opted for surgical intervention. Past Medical History:   Diagnosis Date    Arthritis     Right hip    ED (erectile dysfunction)     Hypertension     Obesity 5/25/2010    Osteoarthritis of right hip 6/20/2021    Sinuitis     Sleep apnea      Past Surgical History:   Procedure Laterality Date    HX APPENDECTOMY  1987    HX ORTHOPAEDIC Right 1992    knee     Social History     Occupational History    Not on file   Tobacco Use    Smoking status: Current Some Day Smoker    Smokeless tobacco: Never Used    Tobacco comment: cigar   Vaping Use    Vaping Use: Never used   Substance and Sexual Activity    Alcohol use: Yes     Comment: occasionally    Drug use: Never    Sexual activity: Not on file     Prior to Admission medications    Medication Sig Start Date End Date Taking? Authorizing Provider   spironolactone (ALDACTONE) 25 mg tablet Take 1 Tablet by mouth daily. 6/9/21   Shabbir Ho MD   phentermine (ADIPEX-P) 37.5 mg tablet Take 1 Tablet by mouth every morning.  Max Daily Amount: 37.5 mg. 6/9/21   Garland Zarate MD   amLODIPine-valsartan (EXFORGE)  mg per tablet TAKE 1 TABLET BY MOUTH EVERY DAY 3/4/21   Garland Zarate MD   ketoconazole (NIZORAL) 2 % shampoo Apply quarter size daily as needed 11/4/20   Garland Zarate MD   multivitamin (ONE A DAY) tablet Take 1 Tab by mouth daily. Provider, Historical   cpap machine kit by Does Not Apply route nightly. [facility].    Provider, Historical   triamcinolone acetonide (KENALOG) 0.1 % topical cream APPLY A DIME SIZED AMOUNT TO AFFECTED AREA TWICE A DAY FOR 1 WEEK AS NEEDED 11/9/19   Keila Zarate MD   ibuprofen (MOTRIN) 800 mg tablet Take 800 mg by mouth every eight (8) hours as needed. 10/22/10   Provider, Historical   triamcinolone acetonide (KENALOG) 0.1 % topical cream Apply  to affected area two (2) times a day. Apply dime size to affected area daily for 1 week as needed 6/20/17   Padmini Borjas MD       Allergies: No Known Allergies     Review of Systems:  Pertinent items are noted in the History of Present Illness. Objective:       Physical Exam:  HEENT: Normocephalic, atraumatic  Lungs:  Clear to auscultation  Heart:   Regular rate and rhythm  Abdomen: Soft  Extremities:  Pain with range of motion of the right hip. Passive flexion  degrees,                       passive internal rotation 0-10 degrees, with pain throughout ROM,                        passive external rotation 10-20 degrees with pain at the arc of motion. Antalgic gait noted. Assessment:      Arthritis of the right hip. Plan:       Proceed with scheduled RIGHT TOTAL HIP ARTHROPLASTY. The various methods of treatment have been discussed with the patient and family. After consideration of risks, benefits, and other options for treatment, the patient has consented to surgical interventions. Questions were answered and preoperative teaching was done by Dr Alicia Gerber.      Signed By: Sidney Fountainma     June 20, 2021

## 2021-06-21 ENCOUNTER — APPOINTMENT (OUTPATIENT)
Dept: GENERAL RADIOLOGY | Age: 46
End: 2021-06-21
Attending: PHYSICIAN ASSISTANT
Payer: COMMERCIAL

## 2021-06-21 ENCOUNTER — ANESTHESIA (OUTPATIENT)
Dept: SURGERY | Age: 46
End: 2021-06-21
Payer: COMMERCIAL

## 2021-06-21 ENCOUNTER — HOSPITAL ENCOUNTER (OUTPATIENT)
Age: 46
Discharge: HOME | End: 2021-06-21
Attending: ORTHOPAEDIC SURGERY | Admitting: ORTHOPAEDIC SURGERY
Payer: COMMERCIAL

## 2021-06-21 ENCOUNTER — APPOINTMENT (OUTPATIENT)
Dept: GENERAL RADIOLOGY | Age: 46
End: 2021-06-21
Attending: ORTHOPAEDIC SURGERY
Payer: COMMERCIAL

## 2021-06-21 ENCOUNTER — HOME HEALTH ADMISSION (OUTPATIENT)
Dept: HOME HEALTH SERVICES | Facility: HOME HEALTH | Age: 46
End: 2021-06-21
Payer: COMMERCIAL

## 2021-06-21 VITALS
TEMPERATURE: 97.8 F | BODY MASS INDEX: 34 KG/M2 | SYSTOLIC BLOOD PRESSURE: 104 MMHG | HEART RATE: 66 BPM | DIASTOLIC BLOOD PRESSURE: 71 MMHG | HEIGHT: 76 IN | OXYGEN SATURATION: 99 % | WEIGHT: 279.2 LBS | RESPIRATION RATE: 16 BRPM

## 2021-06-21 DIAGNOSIS — M16.11 PRIMARY OSTEOARTHRITIS OF RIGHT HIP: Primary | ICD-10-CM

## 2021-06-21 LAB
ABO + RH BLD: NORMAL
BLOOD GROUP ANTIBODIES SERPL: NORMAL
GLUCOSE BLD STRIP.AUTO-MCNC: 102 MG/DL (ref 70–110)
SPECIMEN EXP DATE BLD: NORMAL

## 2021-06-21 PROCEDURE — 97165 OT EVAL LOW COMPLEX 30 MIN: CPT

## 2021-06-21 PROCEDURE — 97535 SELF CARE MNGMENT TRAINING: CPT

## 2021-06-21 PROCEDURE — C1776 JOINT DEVICE (IMPLANTABLE): HCPCS | Performed by: ORTHOPAEDIC SURGERY

## 2021-06-21 PROCEDURE — 74011250636 HC RX REV CODE- 250/636: Performed by: ORTHOPAEDIC SURGERY

## 2021-06-21 PROCEDURE — 76942 ECHO GUIDE FOR BIOPSY: CPT | Performed by: ORTHOPAEDIC SURGERY

## 2021-06-21 PROCEDURE — 76210000023 HC REC RM PH II 2 TO 2.5 HR

## 2021-06-21 PROCEDURE — 77030027138 HC INCENT SPIROMETER -A: Performed by: ORTHOPAEDIC SURGERY

## 2021-06-21 PROCEDURE — 74011250636 HC RX REV CODE- 250/636: Performed by: ANESTHESIOLOGY

## 2021-06-21 PROCEDURE — 74011250636 HC RX REV CODE- 250/636: Performed by: NURSE ANESTHETIST, CERTIFIED REGISTERED

## 2021-06-21 PROCEDURE — 76210000016 HC OR PH I REC 1 TO 1.5 HR: Performed by: ORTHOPAEDIC SURGERY

## 2021-06-21 PROCEDURE — 77030020782 HC GWN BAIR PAWS FLX 3M -B: Performed by: ORTHOPAEDIC SURGERY

## 2021-06-21 PROCEDURE — 77030022295 HC SEAL BPLR VSL DISP MEDT -F: Performed by: ORTHOPAEDIC SURGERY

## 2021-06-21 PROCEDURE — 74011250637 HC RX REV CODE- 250/637: Performed by: PHYSICIAN ASSISTANT

## 2021-06-21 PROCEDURE — 64450 NJX AA&/STRD OTHER PN/BRANCH: CPT | Performed by: ANESTHESIOLOGY

## 2021-06-21 PROCEDURE — 86901 BLOOD TYPING SEROLOGIC RH(D): CPT

## 2021-06-21 PROCEDURE — 73501 X-RAY EXAM HIP UNI 1 VIEW: CPT

## 2021-06-21 PROCEDURE — 74011000250 HC RX REV CODE- 250: Performed by: ORTHOPAEDIC SURGERY

## 2021-06-21 PROCEDURE — 77030003666 HC NDL SPINAL BD -A: Performed by: ORTHOPAEDIC SURGERY

## 2021-06-21 PROCEDURE — 97161 PT EVAL LOW COMPLEX 20 MIN: CPT

## 2021-06-21 PROCEDURE — 77030016060 HC NDL NRV BLK TELE -A: Performed by: ANESTHESIOLOGY

## 2021-06-21 PROCEDURE — 77030040361 HC SLV COMPR DVT MDII -B: Performed by: ORTHOPAEDIC SURGERY

## 2021-06-21 PROCEDURE — 77030013708 HC HNDPC SUC IRR PULS STRY –B: Performed by: ORTHOPAEDIC SURGERY

## 2021-06-21 PROCEDURE — 77030037713 HC CLOSR DEV INCIS ZIP STRY -B: Performed by: ORTHOPAEDIC SURGERY

## 2021-06-21 PROCEDURE — 77030041075 HC DRSG AG OPTIFRM MDII -B: Performed by: ORTHOPAEDIC SURGERY

## 2021-06-21 PROCEDURE — 77030011264 HC ELECTRD BLD EXT COVD -A: Performed by: ORTHOPAEDIC SURGERY

## 2021-06-21 PROCEDURE — 77030012508 HC MSK AIRWY LMA AMBU -A: Performed by: ANESTHESIOLOGY

## 2021-06-21 PROCEDURE — 2709999900 HC NON-CHARGEABLE SUPPLY: Performed by: ORTHOPAEDIC SURGERY

## 2021-06-21 PROCEDURE — 74011250637 HC RX REV CODE- 250/637: Performed by: ANESTHESIOLOGY

## 2021-06-21 PROCEDURE — 74011250636 HC RX REV CODE- 250/636: Performed by: PHYSICIAN ASSISTANT

## 2021-06-21 PROCEDURE — 76010000149 HC OR TIME 1 TO 1.5 HR: Performed by: ORTHOPAEDIC SURGERY

## 2021-06-21 PROCEDURE — 77030031139 HC SUT VCRL2 J&J -A: Performed by: ORTHOPAEDIC SURGERY

## 2021-06-21 PROCEDURE — 36415 COLL VENOUS BLD VENIPUNCTURE: CPT

## 2021-06-21 PROCEDURE — 74011000258 HC RX REV CODE- 258: Performed by: NURSE ANESTHETIST, CERTIFIED REGISTERED

## 2021-06-21 PROCEDURE — 77030038010: Performed by: ORTHOPAEDIC SURGERY

## 2021-06-21 PROCEDURE — 74011250637 HC RX REV CODE- 250/637: Performed by: ORTHOPAEDIC SURGERY

## 2021-06-21 PROCEDURE — 82962 GLUCOSE BLOOD TEST: CPT

## 2021-06-21 PROCEDURE — 74011000250 HC RX REV CODE- 250: Performed by: NURSE ANESTHETIST, CERTIFIED REGISTERED

## 2021-06-21 PROCEDURE — 76060000033 HC ANESTHESIA 1 TO 1.5 HR: Performed by: ORTHOPAEDIC SURGERY

## 2021-06-21 PROCEDURE — 97116 GAIT TRAINING THERAPY: CPT

## 2021-06-21 PROCEDURE — 77030012893: Performed by: ORTHOPAEDIC SURGERY

## 2021-06-21 DEVICE — IMPLANTABLE DEVICE
Type: IMPLANTABLE DEVICE | Site: HIP | Status: FUNCTIONAL
Brand: SIGNATURE FEMORAL HEAD

## 2021-06-21 DEVICE — IMPLANTABLE DEVICE
Type: IMPLANTABLE DEVICE | Site: HIP | Status: FUNCTIONAL
Brand: LOGICAL G-SERIES

## 2021-06-21 DEVICE — IMPLANTABLE DEVICE
Type: IMPLANTABLE DEVICE | Site: HIP | Status: FUNCTIONAL
Brand: SPARTAN HIP STEM

## 2021-06-21 DEVICE — IMPLANTABLE DEVICE
Type: IMPLANTABLE DEVICE | Site: HIP | Status: FUNCTIONAL
Brand: LOGICAL

## 2021-06-21 RX ORDER — PANTOPRAZOLE SODIUM 40 MG/1
40 TABLET, DELAYED RELEASE ORAL DAILY
Status: DISCONTINUED | OUTPATIENT
Start: 2021-06-21 | End: 2021-06-21

## 2021-06-21 RX ORDER — ZOLPIDEM TARTRATE 5 MG/1
5-10 TABLET ORAL
Status: DISCONTINUED | OUTPATIENT
Start: 2021-06-21 | End: 2021-06-21 | Stop reason: HOSPADM

## 2021-06-21 RX ORDER — OXYCODONE HYDROCHLORIDE 5 MG/1
10 TABLET ORAL
Status: DISCONTINUED | OUTPATIENT
Start: 2021-06-21 | End: 2021-06-21 | Stop reason: HOSPADM

## 2021-06-21 RX ORDER — ACETAMINOPHEN 325 MG/1
650 TABLET ORAL EVERY 6 HOURS
Status: DISCONTINUED | OUTPATIENT
Start: 2021-06-21 | End: 2021-06-21 | Stop reason: HOSPADM

## 2021-06-21 RX ORDER — PROPOFOL 10 MG/ML
INJECTION, EMULSION INTRAVENOUS AS NEEDED
Status: DISCONTINUED | OUTPATIENT
Start: 2021-06-21 | End: 2021-06-21 | Stop reason: HOSPADM

## 2021-06-21 RX ORDER — KETAMINE HYDROCHLORIDE 10 MG/ML
INJECTION, SOLUTION INTRAMUSCULAR; INTRAVENOUS AS NEEDED
Status: DISCONTINUED | OUTPATIENT
Start: 2021-06-21 | End: 2021-06-21 | Stop reason: HOSPADM

## 2021-06-21 RX ORDER — SODIUM CHLORIDE 0.9 % (FLUSH) 0.9 %
5-40 SYRINGE (ML) INJECTION EVERY 8 HOURS
Status: DISCONTINUED | OUTPATIENT
Start: 2021-06-21 | End: 2021-06-21 | Stop reason: HOSPADM

## 2021-06-21 RX ORDER — DEXTROSE 50 % IN WATER (D50W) INTRAVENOUS SYRINGE
25-50 AS NEEDED
Status: DISCONTINUED | OUTPATIENT
Start: 2021-06-21 | End: 2021-06-21 | Stop reason: HOSPADM

## 2021-06-21 RX ORDER — KETOROLAC TROMETHAMINE 15 MG/ML
INJECTION, SOLUTION INTRAMUSCULAR; INTRAVENOUS AS NEEDED
Status: DISCONTINUED | OUTPATIENT
Start: 2021-06-21 | End: 2021-06-21 | Stop reason: HOSPADM

## 2021-06-21 RX ORDER — LANOLIN ALCOHOL/MO/W.PET/CERES
1 CREAM (GRAM) TOPICAL 3 TIMES DAILY
Status: DISCONTINUED | OUTPATIENT
Start: 2021-06-21 | End: 2021-06-21 | Stop reason: HOSPADM

## 2021-06-21 RX ORDER — TRANEXAMIC ACID 650 1/1
1950 TABLET ORAL ONCE
Status: DISCONTINUED | OUTPATIENT
Start: 2021-06-21 | End: 2021-06-21

## 2021-06-21 RX ORDER — SODIUM CHLORIDE 0.9 % (FLUSH) 0.9 %
5-40 SYRINGE (ML) INJECTION AS NEEDED
Status: DISCONTINUED | OUTPATIENT
Start: 2021-06-21 | End: 2021-06-21 | Stop reason: HOSPADM

## 2021-06-21 RX ORDER — HYDROMORPHONE HYDROCHLORIDE 1 MG/ML
0.2 INJECTION, SOLUTION INTRAMUSCULAR; INTRAVENOUS; SUBCUTANEOUS AS NEEDED
Status: DISCONTINUED | OUTPATIENT
Start: 2021-06-21 | End: 2021-06-21 | Stop reason: HOSPADM

## 2021-06-21 RX ORDER — ASPIRIN 81 MG/1
81 TABLET ORAL 2 TIMES DAILY
Qty: 42 TABLET | Refills: 0 | Status: SHIPPED | OUTPATIENT
Start: 2021-06-21 | End: 2021-07-12

## 2021-06-21 RX ORDER — HYDROMORPHONE HYDROCHLORIDE 1 MG/ML
0.5 INJECTION, SOLUTION INTRAMUSCULAR; INTRAVENOUS; SUBCUTANEOUS
Status: DISCONTINUED | OUTPATIENT
Start: 2021-06-21 | End: 2021-06-21 | Stop reason: HOSPADM

## 2021-06-21 RX ORDER — SODIUM CHLORIDE 9 MG/ML
300 INJECTION, SOLUTION INTRAVENOUS CONTINUOUS
Status: DISCONTINUED | OUTPATIENT
Start: 2021-06-21 | End: 2021-06-21 | Stop reason: HOSPADM

## 2021-06-21 RX ORDER — ACETAMINOPHEN 500 MG
1000 TABLET ORAL ONCE
Status: COMPLETED | OUTPATIENT
Start: 2021-06-21 | End: 2021-06-21

## 2021-06-21 RX ORDER — MELOXICAM 7.5 MG/1
7.5 TABLET ORAL 2 TIMES DAILY
Qty: 28 TABLET | Refills: 0 | Status: SHIPPED | OUTPATIENT
Start: 2021-06-21 | End: 2021-07-05

## 2021-06-21 RX ORDER — GLYCOPYRROLATE 0.2 MG/ML
INJECTION INTRAMUSCULAR; INTRAVENOUS AS NEEDED
Status: DISCONTINUED | OUTPATIENT
Start: 2021-06-21 | End: 2021-06-21 | Stop reason: HOSPADM

## 2021-06-21 RX ORDER — ASPIRIN 81 MG/1
81 TABLET ORAL 2 TIMES DAILY
Status: DISCONTINUED | OUTPATIENT
Start: 2021-06-21 | End: 2021-06-21 | Stop reason: HOSPADM

## 2021-06-21 RX ORDER — DIPHENHYDRAMINE HYDROCHLORIDE 50 MG/ML
12.5 INJECTION, SOLUTION INTRAMUSCULAR; INTRAVENOUS
Status: DISCONTINUED | OUTPATIENT
Start: 2021-06-21 | End: 2021-06-21 | Stop reason: HOSPADM

## 2021-06-21 RX ORDER — NALOXONE HYDROCHLORIDE 0.4 MG/ML
0.1 INJECTION, SOLUTION INTRAMUSCULAR; INTRAVENOUS; SUBCUTANEOUS AS NEEDED
Status: DISCONTINUED | OUTPATIENT
Start: 2021-06-21 | End: 2021-06-21 | Stop reason: HOSPADM

## 2021-06-21 RX ORDER — ONDANSETRON 2 MG/ML
4 INJECTION INTRAMUSCULAR; INTRAVENOUS
Status: DISCONTINUED | OUTPATIENT
Start: 2021-06-21 | End: 2021-06-21 | Stop reason: HOSPADM

## 2021-06-21 RX ORDER — PANTOPRAZOLE SODIUM 40 MG/1
40 TABLET, DELAYED RELEASE ORAL DAILY
Status: DISCONTINUED | OUTPATIENT
Start: 2021-06-21 | End: 2021-06-21 | Stop reason: HOSPADM

## 2021-06-21 RX ORDER — METOCLOPRAMIDE HYDROCHLORIDE 5 MG/ML
10 INJECTION INTRAMUSCULAR; INTRAVENOUS
Status: DISCONTINUED | OUTPATIENT
Start: 2021-06-21 | End: 2021-06-21 | Stop reason: HOSPADM

## 2021-06-21 RX ORDER — DEXAMETHASONE SODIUM PHOSPHATE 4 MG/ML
4 INJECTION, SOLUTION INTRA-ARTICULAR; INTRALESIONAL; INTRAMUSCULAR; INTRAVENOUS; SOFT TISSUE ONCE
Status: COMPLETED | OUTPATIENT
Start: 2021-06-21 | End: 2021-06-21

## 2021-06-21 RX ORDER — MIDAZOLAM HYDROCHLORIDE 1 MG/ML
INJECTION, SOLUTION INTRAMUSCULAR; INTRAVENOUS
Status: SHIPPED | OUTPATIENT
Start: 2021-06-21 | End: 2021-06-21

## 2021-06-21 RX ORDER — SODIUM CHLORIDE, SODIUM LACTATE, POTASSIUM CHLORIDE, CALCIUM CHLORIDE 600; 310; 30; 20 MG/100ML; MG/100ML; MG/100ML; MG/100ML
1000 INJECTION, SOLUTION INTRAVENOUS CONTINUOUS
Status: DISCONTINUED | OUTPATIENT
Start: 2021-06-21 | End: 2021-06-21 | Stop reason: HOSPADM

## 2021-06-21 RX ORDER — OXYCODONE HYDROCHLORIDE 5 MG/1
5 TABLET ORAL
Status: DISCONTINUED | OUTPATIENT
Start: 2021-06-21 | End: 2021-06-21 | Stop reason: HOSPADM

## 2021-06-21 RX ORDER — FLUMAZENIL 0.1 MG/ML
0.2 INJECTION INTRAVENOUS
Status: DISCONTINUED | OUTPATIENT
Start: 2021-06-21 | End: 2021-06-21 | Stop reason: HOSPADM

## 2021-06-21 RX ORDER — TRANEXAMIC ACID 650 1/1
1950 TABLET ORAL SEE ADMIN INSTRUCTIONS
Status: DISCONTINUED | OUTPATIENT
Start: 2021-06-21 | End: 2021-06-21

## 2021-06-21 RX ORDER — DOCUSATE SODIUM 100 MG/1
100 CAPSULE, LIQUID FILLED ORAL DAILY
Status: DISCONTINUED | OUTPATIENT
Start: 2021-06-22 | End: 2021-06-21 | Stop reason: HOSPADM

## 2021-06-21 RX ORDER — NALOXONE HYDROCHLORIDE 0.4 MG/ML
0.4 INJECTION, SOLUTION INTRAMUSCULAR; INTRAVENOUS; SUBCUTANEOUS AS NEEDED
Status: DISCONTINUED | OUTPATIENT
Start: 2021-06-21 | End: 2021-06-21 | Stop reason: HOSPADM

## 2021-06-21 RX ORDER — HYDROMORPHONE HYDROCHLORIDE 2 MG/ML
INJECTION, SOLUTION INTRAMUSCULAR; INTRAVENOUS; SUBCUTANEOUS AS NEEDED
Status: DISCONTINUED | OUTPATIENT
Start: 2021-06-21 | End: 2021-06-21 | Stop reason: HOSPADM

## 2021-06-21 RX ORDER — DEXAMETHASONE SODIUM PHOSPHATE 4 MG/ML
8 INJECTION, SOLUTION INTRA-ARTICULAR; INTRALESIONAL; INTRAMUSCULAR; INTRAVENOUS; SOFT TISSUE ONCE
Status: DISCONTINUED | OUTPATIENT
Start: 2021-06-21 | End: 2021-06-21

## 2021-06-21 RX ORDER — CEFADROXIL 500 MG/1
500 CAPSULE ORAL 2 TIMES DAILY
Qty: 10 CAPSULE | Refills: 0 | Status: SHIPPED | OUTPATIENT
Start: 2021-06-21 | End: 2021-06-26

## 2021-06-21 RX ORDER — SODIUM CHLORIDE, SODIUM LACTATE, POTASSIUM CHLORIDE, CALCIUM CHLORIDE 600; 310; 30; 20 MG/100ML; MG/100ML; MG/100ML; MG/100ML
125 INJECTION, SOLUTION INTRAVENOUS CONTINUOUS
Status: DISCONTINUED | OUTPATIENT
Start: 2021-06-21 | End: 2021-06-21 | Stop reason: HOSPADM

## 2021-06-21 RX ORDER — KETOROLAC TROMETHAMINE 30 MG/ML
15 INJECTION, SOLUTION INTRAMUSCULAR; INTRAVENOUS EVERY 6 HOURS
Status: DISCONTINUED | OUTPATIENT
Start: 2021-06-21 | End: 2021-06-21 | Stop reason: HOSPADM

## 2021-06-21 RX ORDER — OXYCODONE HYDROCHLORIDE 5 MG/1
5 TABLET ORAL
Qty: 42 TABLET | Refills: 0 | Status: SHIPPED | OUTPATIENT
Start: 2021-06-21 | End: 2021-06-28

## 2021-06-21 RX ORDER — TRANEXAMIC ACID 10 MG/ML
1 INJECTION, SOLUTION INTRAVENOUS SEE ADMIN INSTRUCTIONS
Status: COMPLETED | OUTPATIENT
Start: 2021-06-21 | End: 2021-06-21

## 2021-06-21 RX ORDER — SODIUM CHLORIDE 9 MG/ML
125 INJECTION, SOLUTION INTRAVENOUS CONTINUOUS
Status: DISCONTINUED | OUTPATIENT
Start: 2021-06-21 | End: 2021-06-21 | Stop reason: HOSPADM

## 2021-06-21 RX ORDER — ONDANSETRON 2 MG/ML
INJECTION INTRAMUSCULAR; INTRAVENOUS AS NEEDED
Status: DISCONTINUED | OUTPATIENT
Start: 2021-06-21 | End: 2021-06-21 | Stop reason: HOSPADM

## 2021-06-21 RX ORDER — MAGNESIUM SULFATE 100 %
4 CRYSTALS MISCELLANEOUS AS NEEDED
Status: DISCONTINUED | OUTPATIENT
Start: 2021-06-21 | End: 2021-06-21 | Stop reason: HOSPADM

## 2021-06-21 RX ORDER — CEFAZOLIN SODIUM/WATER 2 G/20 ML
2 SYRINGE (ML) INTRAVENOUS EVERY 8 HOURS
Status: DISCONTINUED | OUTPATIENT
Start: 2021-06-21 | End: 2021-06-21 | Stop reason: HOSPADM

## 2021-06-21 RX ORDER — LIDOCAINE HYDROCHLORIDE 20 MG/ML
INJECTION, SOLUTION EPIDURAL; INFILTRATION; INTRACAUDAL; PERINEURAL AS NEEDED
Status: DISCONTINUED | OUTPATIENT
Start: 2021-06-21 | End: 2021-06-21 | Stop reason: HOSPADM

## 2021-06-21 RX ORDER — CELECOXIB 100 MG/1
200 CAPSULE ORAL ONCE
Status: COMPLETED | OUTPATIENT
Start: 2021-06-21 | End: 2021-06-21

## 2021-06-21 RX ADMIN — DEXAMETHASONE SODIUM PHOSPHATE 4 MG: 4 INJECTION, SOLUTION INTRAMUSCULAR; INTRAVENOUS at 10:20

## 2021-06-21 RX ADMIN — CEFAZOLIN 3 G: 1 INJECTION, POWDER, FOR SOLUTION INTRAVENOUS at 11:23

## 2021-06-21 RX ADMIN — HYDROMORPHONE HYDROCHLORIDE 1 MG: 2 INJECTION, SOLUTION INTRAMUSCULAR; INTRAVENOUS; SUBCUTANEOUS at 11:30

## 2021-06-21 RX ADMIN — ONDANSETRON HYDROCHLORIDE 4 MG: 2 INJECTION INTRAMUSCULAR; INTRAVENOUS at 11:32

## 2021-06-21 RX ADMIN — PROPOFOL 200 MG: 10 INJECTION, EMULSION INTRAVENOUS at 11:30

## 2021-06-21 RX ADMIN — DEXMEDETOMIDINE HYDROCHLORIDE 16 MCG: 100 INJECTION, SOLUTION INTRAVENOUS at 11:32

## 2021-06-21 RX ADMIN — PANTOPRAZOLE SODIUM 40 MG: 40 TABLET, DELAYED RELEASE ORAL at 10:20

## 2021-06-21 RX ADMIN — ACETAMINOPHEN 1000 MG: 500 TABLET ORAL at 10:20

## 2021-06-21 RX ADMIN — CELECOXIB 200 MG: 100 CAPSULE ORAL at 10:20

## 2021-06-21 RX ADMIN — LIDOCAINE HYDROCHLORIDE 80 MG: 20 INJECTION, SOLUTION EPIDURAL; INFILTRATION; INTRACAUDAL; PERINEURAL at 11:30

## 2021-06-21 RX ADMIN — MEPIVACAINE HYDROCHLORIDE 15 ML: 20 INJECTION, SOLUTION EPIDURAL; INFILTRATION at 10:39

## 2021-06-21 RX ADMIN — MIDAZOLAM 4 MG: 1 INJECTION INTRAMUSCULAR; INTRAVENOUS at 10:39

## 2021-06-21 RX ADMIN — KETAMINE HYDROCHLORIDE 50 MG: 10 INJECTION, SOLUTION INTRAMUSCULAR; INTRAVENOUS at 11:30

## 2021-06-21 RX ADMIN — TRANEXAMIC ACID 1 G: 10 INJECTION, SOLUTION INTRAVENOUS at 11:32

## 2021-06-21 RX ADMIN — KETOROLAC TROMETHAMINE 30 MG: 15 INJECTION, SOLUTION INTRAMUSCULAR; INTRAVENOUS at 12:24

## 2021-06-21 RX ADMIN — SODIUM CHLORIDE, SODIUM LACTATE, POTASSIUM CHLORIDE, AND CALCIUM CHLORIDE 125 ML/HR: 600; 310; 30; 20 INJECTION, SOLUTION INTRAVENOUS at 09:58

## 2021-06-21 RX ADMIN — GLYCOPYRROLATE 0.2 MG: 0.2 INJECTION INTRAMUSCULAR; INTRAVENOUS at 11:20

## 2021-06-21 RX ADMIN — TRANEXAMIC ACID 1 G: 10 INJECTION, SOLUTION INTRAVENOUS at 12:24

## 2021-06-21 RX ADMIN — MIDAZOLAM 2 MG: 1 INJECTION INTRAMUSCULAR; INTRAVENOUS at 11:20

## 2021-06-21 RX ADMIN — SODIUM CHLORIDE, SODIUM LACTATE, POTASSIUM CHLORIDE, AND CALCIUM CHLORIDE 1000 ML: 600; 310; 30; 20 INJECTION, SOLUTION INTRAVENOUS at 09:58

## 2021-06-21 NOTE — DISCHARGE INSTRUCTIONS
[address] [place] [address] Sports Medicine   Patient Discharge Instructions    Skinny Sung / [id number] : 1975    Admitted 2021 Discharged: 2021     IF YOU HAVE ANY PROBLEMS ONCE YOU ARE AT HOME CALL THE FOLLOWING NUMBERS:   Main office number: [phone]    Your follow up appointment to see either Dr. Tri Salgado PA-C, or [facility] JUAN as scheduled in 2 weeks. If you are unsure of your appointment date call the office at [phone]. Medication Instructions     · Resume your home medictions as directed, you may have directed not to resume supplements until after your follow up. · A prescription for pain medication has been given   · It is important that you take the medication exactly as they are prescribed. · Keep your medication in the bottles provided by the pharmacist and keep a list of the medication names, dosages, and times to be taken in your wallet. · Do not take other medications without consulting your doctor. What to do at [address] [place] [address] Ave your prehospital diet. If you have excessive nausea or vomitting call your doctor's office. Be sure to maintain adequate fluid intake. Some pain medications may cause constipation. Remember to drink fluids, stay as active as possible, and eat plenty of fiber-rich foods. Begin In-Home Physical Therapy; 3 times a week to work on gait training, range of motion, strengthening, and weight bearing exercises as tolerable. Continue to use your walker or cane when walking. May progress from the walker to a cane to complete total bearing as tolerable. Patient may shower. Wrap incision with plastic wrap/covering to prevent incision from getting wet. Avoid complete immersion. YOUR DRESSING SHOULD BE CHANGED BY YOUR HOME HEALTH NURSE 5-7 AFTER SURGERY ACCORDING TO THE DATE WRITTEN ON YOUR DRESSING.           When to Call    - Call if you have a temperature greater then 101  - Unable to keep food down  - Are unable to bear any wieght   - Need a pain medication refill     Information obtained by :  I understand that if any problems occur once I am at home I am to contact my physician. I understand and acknowledge receipt of the instructions indicated above.                                                                                                                                            Physician's or R.N.'s Signature                                                                  Date/Time                                                                                                                                              Patient or Representative Signature                                                          Date/Time

## 2021-06-21 NOTE — PROGRESS NOTES
Problem: Mobility Impaired (Adult and Pediatric)  Goal: *Acute Goals and Plan of Care (Insert Text)  Description: PT goals to be met in 1 day:  Pt will be able to perform supine<>sit SBA for transfers at home. Pt will be able to perform sit<>stand SBA for increased ability to transfer at home safely. Pt will be able to participate in gt training >150' w/ RW, WBAT, GB and CGA/SBA for improved ability in home upon d/c. Pt will be able to perform stair training step to pattern, B/U rail and CGA to obtain safe entry into home upon d/c. Pt will be educated regarding HEP per MD protocol for optimal AROM/strength outcomes. Note: [x]  Patient has met MD mobilization critieria for d/c home   [x]  Recommend HH with 24 hour adult care   []  Benefit from additional acute PT session to address:      PHYSICAL THERAPY EVALUATION    Patient: Roxane Lara (30 y.o. male)  Date: 6/21/2021  Primary Diagnosis: Osteoarthritis of right hip [M16.11]  Procedure(s) (LRB):  RIGHT TOTAL HIP REPLACEMENT ANTERIOR APPROACH WITH C-ARM (ORTHO DEVELOPMENT) (Right) Day of Surgery   Precautions:   Fall, WBAT    PLOF: Independent    ASSESSMENT :  Based on the objective data described below, the patient presents with decreased mobility in regards to bed mobility, transfers, gt quality and tolerance, balance, stair negotiation and safety due to R CHAR surgery. Decreased AROM of R hip, dec strength of R hip, pain in R hip, dec sensation of R hip also impacting pt functional mobility. Pt rating pain on numerical pain scale pre/post and during session 4/10. Pt and wife ed regarding mobility safety, WB, HEP, ice application/use, elevation, environmental safety and home safe techniques. Pt sitting in recliner upon arrival.  Pt able to perform sit<>stand w/ CGA/SBA. Safety vc required throughout session to reinforce safety. Pt able to participate in gt training using RW, GB, WBAT and CGA w/ antalgic gt pattern.   Pt was able to participate in stair training using step to pattern, B rails and CGA. Answered questions by pt and wife in regards to PT and mobility. Pt left sitting in recliner w/ all needs within reach and ice pack to R hip. Nurse aware of session and outcomes. Recommend HHPT with responsible adult care at least 24 hours upon hospital d/c. Patient will benefit from skilled intervention to address the above impairments. Patient's rehabilitation potential is considered to be Good  Factors which may influence rehabilitation potential include:   []         None noted  []         Mental ability/status  []         Medical condition  []         Home/family situation and support systems  []         Safety awareness  [x]         Pain tolerance/management  []         Other:      PLAN :  Recommendations and Planned Interventions:   [x]           Bed Mobility Training             []    Neuromuscular Re-Education  [x]           Transfer Training                   []    Orthotic/Prosthetic Training  [x]           Gait Training                          [x]    Modalities  [x]           Therapeutic Exercises           [x]    Edema Management/Control  [x]           Therapeutic Activities            [x]    Family Training/Education  [x]           Patient Education  []           Other (comment):    Frequency/Duration: Patient will be followed by physical therapy 1-2 times per day/4-7 days per week to address goals. Discharge Recommendations: Home Health  Further Equipment Recommendations for Discharge: N/A     SUBJECTIVE:   Patient stated It's starting to hurt.     OBJECTIVE DATA SUMMARY:     Past Medical History:   Diagnosis Date    Arthritis     Right hip    ED (erectile dysfunction)     Hypertension     Obesity 5/25/2010    Osteoarthritis of right hip 6/20/2021    Sinuitis     Sleep apnea      Past Surgical History:   Procedure Laterality Date    HX APPENDECTOMY  1987    HX ORTHOPAEDIC Right 1992    knee     Barriers to Learning/Limitations: yes; anesthesia  Compensate with: Visual Cues, Verbal Cues, and Tactile Cues  Home Situation:  Home Situation  Home Environment: Private residence  # Steps to Enter: 1  One/Two Story Residence: Two story  # of Interior Steps: 13  Interior Rails: Right (transitions to L)  Lift Chair Available: No  Living Alone: No  Support Systems: Spouse/Significant Other/Partner, Family member(s)  Patient Expects to be Discharged to[de-identified] House  Current DME Used/Available at Home: CPAP, Walker, rolling, Cane, straight  Tub or Shower Type: Shower  Critical Behavior:  Neurologic State: Alert  Orientation Level: Oriented to person;Oriented to place;Oriented to situation  Cognition: Follows commands  Safety/Judgement: Awareness of environment  Psychosocial  Patient Behaviors: Calm; Cooperative  Family  Behaviors: Supportive;Calm  Purposeful Interaction: Yes  Pt Identified Daily Priority: Clinical issues (comment)  Caritas Process: Nurture loving kindness  Caring Interventions: Reassure; Therapeutic modalities  Reassure: Therapeutic listening; Informing  Therapeutic Modalities: Deep breathing  Skin Condition/Temp: Dry;Warm  Family  Behaviors: Supportive;Calm  Skin Integrity: Incision (comment) (R hip)  Skin Integumentary  Skin Color: Appropriate for ethnicity  Skin Condition/Temp: Dry;Warm  Skin Integrity: Incision (comment) (R hip)  Turgor: Non-tenting  Strength:    Strength: Generally decreased, functional  Tone & Sensation:   Tone: Normal  Sensation: Impaired (R hip)  Range Of Motion:  AROM: Generally decreased, functional  Posture:  Functional Mobility:  Bed Mobility:  Scooting: Stand-by assistance  Transfers:  Sit to Stand: Contact guard assistance (vc)  Stand to Sit: Contact guard assistance;Stand-by assistance (vc)  Balance:   Sitting: Intact  Standing: Intact; With support  Wheelchair Mobility:  Ambulation/Gait Training:  Distance (ft): 150 Feet (ft)  Assistive Device: Walker, rolling;Gait belt  Ambulation - Level of Assistance: Contact guard assistance (vc)  Gait Abnormalities: Antalgic;Decreased step clearance; Step to gait  Right Side Weight Bearing: As tolerated  Base of Support: Shift to left  Stance: Right decreased  Speed/Nora: Slow  Step Length: Left shortened;Right shortened  Swing Pattern: Left asymmetrical;Right asymmetrical  Interventions: Safety awareness training; Tactile cues; Verbal cues; Visual/Demos  Stairs:  Number of Stairs Trained: 8  Stairs - Level of Assistance: Contact guard assistance (vc)  Rail Use: Right      Therapeutic Exercises:   Encouraged HEP  Pain:  Pain level pre-treatment: 4/10   Pain level post-treatment: 4/10   Pain Intervention(s) : Medication (see MAR); Rest, Ice, Repositioning  Response to intervention: Nurse notified, See doc flow    Activity Tolerance:   Fair   Please refer to the flowsheet for vital signs taken during this treatment. After treatment:   [x]         Patient left in no apparent distress sitting up in reclining chair  []         Patient left in no apparent distress in bed  [x]         Call bell left within reach  [x]         Nursing notified  [x]         Caregiver present  []         Bed alarm activated  []         SCDs applied    COMMUNICATION/EDUCATION:   [x]         Role of Physical Therapy in the acute care setting. [x]         Fall prevention education was provided and the patient/caregiver indicated understanding. [x]         Patient/family have participated as able in goal setting and plan of care. [x]         Patient/family agree to work toward stated goals and plan of care. []         Patient understands intent and goals of therapy, but is neutral about his/her participation. []         Patient is unable to participate in goal setting/plan of care: ongoing with therapy staff.  []         Other:     Thank you for this referral.  Remberto Kellogg, PT   Time Calculation: 24 mins      Eval Complexity: History: HIGH Complexity :3+ comorbidities / personal factors will impact the outcome/ POC Exam:MEDIUM Complexity : 3 Standardized tests and measures addressing body structure, function, activity limitation and / or participation in recreation  Presentation: LOW Complexity : Stable, uncomplicated  Clinical Decision Making:Low Complexity    Overall Complexity:LOW

## 2021-06-21 NOTE — ANESTHESIA PROCEDURE NOTES
Peripheral Block    Start time: 6/21/2021 10:39 AM  End time: 6/21/2021 10:49 AM  Performed by: Tyra Agosto MD  Authorized by: Tyra Agosto MD       Pre-procedure:    Indications: at surgeon's request and post-op pain management    Preanesthetic Checklist: patient identified, risks and benefits discussed, site marked, timeout performed, anesthesia consent given and patient being monitored      Block Type:   Block Type:  Pericapsular Nerve Group (PENG)  Laterality:  Right  Monitoring:  Standard ASA monitoring, continuous pulse ox, frequent vital sign checks, heart rate, responsive to questions and oxygen  Injection Technique:  Single shot  Procedures: ultrasound guided    Patient Position: supine  Prep: chlorhexidine    Needle Type:  Stimuplex  Needle Gauge:  21 G  Needle Localization:  Anatomical landmarks  Medication Injected:  Midazolam (VERSED) injection, 4 mg  mepivacaine (PF) 2 % (POLOCAINE) injection, 15 mL  Med Admin Time: 6/21/2021 10:39 AM    Assessment:  Number of attempts:  1  Injection Assessment:  Incremental injection every 5 mL, local visualized surrounding nerve on ultrasound, negative aspiration for blood, no paresthesia, no intravascular symptoms and ultrasound image on chart  Patient tolerance:  Patient tolerated the procedure well with no immediate complications

## 2021-06-21 NOTE — DISCHARGE SUMMARY
[address] [address]   [address] [address]     DISCHARGE SUMMARY     PATIENT: Skinny Orozco     MRN: [id number]   ADMIT DATE: 2021   BILLIN   DISCHARGE DATE: 2021     ATTENDING: Grady Lopez MD   DICTATING: KENNY Johnson     ADMISSION DIAGNOSIS: Osteoarthritis of right hip [M16.11]    DISCHARGE DIAGNOSIS: Status post RIGHT TOTAL HIP ARTHROPLASTY    HISTORY OF PRESENT ILLNESS: The patient is a 50y.o. year-old male   with ongoing right hip pain secondary to osteoarthritis of right hip. The patient's pain has persisted and progressed despite conservative treatments and therapies. The patient has at this time opted for surgical intervention. PAST MEDICAL HISTORY:   Past Medical History:   Diagnosis Date    Arthritis     Right hip    ED (erectile dysfunction)     Hypertension     Obesity 2010    Osteoarthritis of right hip 2021    Sinuitis     Sleep apnea        PAST SURGICAL HISTORY:   Past Surgical History:   Procedure Laterality Date    HX APPENDECTOMY      HX ORTHOPAEDIC Right 1992    knee       ALLERGIES: No Known Allergies     CURRENT MEDICATIONS:  A list of medications prior to the time of admission include:  Prior to Admission medications    Medication Sig Start Date End Date Taking? Authorizing Provider   aspirin delayed-release 81 mg tablet Take 1 Tablet by mouth two (2) times a day for 21 days. 21 Yes Sybil Childress PA   meloxicam (Mobic) 7.5 mg tablet Take 1 Tablet by mouth two (2) times a day for 14 days. 21 Yes Sybil Childress PA   oxyCODONE IR (ROXICODONE) 5 mg immediate release tablet Take 1 Tablet by mouth every four (4) hours as needed for Pain for up to 7 days. Max Daily Amount: 30 mg. 21 Yes Sybil Childress PA   cefadroxil (DURICEF) 500 mg capsule Take 1 Capsule by mouth two (2) times a day for 5 days.  21 Yes Glory Childress PA spironolactone (ALDACTONE) 25 mg tablet Take 1 Tablet by mouth daily. 6/9/21  Yes Kayy Zarate MD   phentermine (ADIPEX-P) 37.5 mg tablet Take 1 Tablet by mouth every morning. Max Daily Amount: 37.5 mg. 6/9/21  Yes Kayy Zarate MD   amLODIPine-valsartan (EXFORGE)  mg per tablet TAKE 1 TABLET BY MOUTH EVERY DAY 3/4/21  Yes Kayy Zarate MD   ketoconazole (NIZORAL) 2 % shampoo Apply quarter size daily as needed 11/4/20  Yes Kayy Zarate MD   multivitamin (ONE A DAY) tablet Take 1 Tab by mouth daily. Yes Provider, Historical   triamcinolone acetonide (KENALOG) 0.1 % topical cream APPLY A DIME SIZED AMOUNT TO AFFECTED AREA TWICE A DAY FOR 1 WEEK AS NEEDED 11/9/19  Yes Kayy Zarate MD   triamcinolone acetonide (KENALOG) 0.1 % topical cream Apply  to affected area two (2) times a day. Apply dime size to affected area daily for 1 week as needed 6/20/17  Yes Kayy Zarate MD   acetaminophen (TYLENOL) 500 mg tablet Take 1,000 mg by mouth every six (6) hours as needed for Pain.     Provider, Historical       FAMILY HISTORY:   Family History   Problem Relation Age of Onset    Hypertension Father        SOCIAL HISTORY:   Social History     Socioeconomic History    Marital status:      Spouse name: Not on file    Number of children: Not on file    Years of education: Not on file    Highest education level: Not on file   Tobacco Use    Smoking status: Current Some Day Smoker    Smokeless tobacco: Never Used    Tobacco comment: cigar   Vaping Use    Vaping Use: Never used   Substance and Sexual Activity    Alcohol use: Yes     Comment: occasionally    Drug use: Never   Social History Narrative    ** Merged History Encounter **          Social Determinants of Health     Financial Resource Strain:     Difficulty of Paying Living Expenses:    Food Insecurity:     Worried About Running Out of Food in the Last Year:     Ran Out of Food in the Last Year:    Transportation Needs:     Lack of Transportation (Medical):     Lack of Transportation (Non-Medical):    Physical Activity:     Days of Exercise per Week:     Minutes of Exercise per Session:    Stress:     Feeling of Stress :    Social Connections:     Frequency of Communication with Friends and Family:     Frequency of Social Gatherings with Friends and Family:     Attends Sabianist Services: Active Member of Clubs or Organizations:     Attends Club or Organization Meetings:     Marital Status:        REVIEW OF SYSTEMS: All review of systems are negative. PHYSICAL EXAMINATION: For a detailed physical exam, please refer to the patient's chart. HOSPITAL COURSE: The patient was taken to surgery the day of admission. he underwent right total hip replacement via the anterior approach. Operative course was benign. Estimated blood loss approximately 300 cc. The patient was taken to the PACU in stable condition and was later taken to the floor in stable condition. Post-op Day #0, patient has done very well.  he has had little to no pain. he had been cleared by physical therapy with stair training. he was placed on Aspirin for DVT prophylaxis. his vitals have remained stable. he has also remained hemodynamically stable. The patient has been recommended for discharge home. DISCHARGE INSTRUCTIONS: The patient is to be discharged home. Discharge Medication List as of 6/21/2021  3:03 PM        START taking these medications    Details   aspirin delayed-release 81 mg tablet Take 1 Tablet by mouth two (2) times a day for 21 days. , Normal, Disp-42 Tablet, R-0      meloxicam (Mobic) 7.5 mg tablet Take 1 Tablet by mouth two (2) times a day for 14 days. , Normal, Disp-28 Tablet, R-0      oxyCODONE IR (ROXICODONE) 5 mg immediate release tablet Take 1 Tablet by mouth every four (4) hours as needed for Pain for up to 7 days.  Max Daily Amount: 30 mg., Normal, Disp-42 Tablet, R-0      cefadroxil (DURICEF) 500 mg capsule Take 1 Capsule by mouth two (2) times a day for 5 days. , Normal, Disp-10 Capsule, R-0           CONTINUE these medications which have NOT CHANGED    Details   spironolactone (ALDACTONE) 25 mg tablet Take 1 Tablet by mouth daily. , Normal, Disp-90 Tablet, R-2      phentermine (ADIPEX-P) 37.5 mg tablet Take 1 Tablet by mouth every morning. Max Daily Amount: 37.5 mg., Normal, Disp-30 Tablet, R-0      amLODIPine-valsartan (EXFORGE)  mg per tablet TAKE 1 TABLET BY MOUTH EVERY DAY, Normal, Disp-90 Tab, R-2      ketoconazole (NIZORAL) 2 % shampoo Apply quarter size daily as needed, Print, Disp-120 mL,R-2      multivitamin (ONE A DAY) tablet Take 1 Tab by mouth daily. , Historical Med      !! triamcinolone acetonide (KENALOG) 0.1 % topical cream APPLY A DIME SIZED AMOUNT TO AFFECTED AREA TWICE A DAY FOR 1 WEEK AS NEEDED, Normal, Disp-30 g, R-2NEEDS MORE REFILLS, OLD SCRIPT IS       !! triamcinolone acetonide (KENALOG) 0.1 % topical cream Apply  to affected area two (2) times a day. Apply dime size to affected area daily for 1 week as needed, Print, Disp-30 g, R-2       !! - Potential duplicate medications found. Please discuss with provider. STOP taking these medications       ibuprofen (MOTRIN) 800 mg tablet Comments:   Reason for Stopping:                 The patient is to continue at home with home physical therapy 3 times a week to work on gait training, range of motion, strengthening, and weightbearing exercises as tolerated on his right lower extremity. The patient is to progress from a walker to a cane to complete total weightbearing as tolerable. The patient is to continue to keep his incision dry. The patient is to followup with Dr. Kelley Lal, Xander Leo PA-C, and/or [facility] J[facility] in the office approximately 10-14 days status post for x-rays and further evaluation.       KENNY Hancock  2021

## 2021-06-21 NOTE — PROGRESS NOTES
Problem: Self Care Deficits Care Plan (Adult)  Goal: *Acute Goals and Plan of Care (Insert Text)  Description: Initial Occupational Therapy Goals (6/21/2021) Within 7 day(s):    1. Patient will perform grooming standing sinkside with supervision for increased independence with ADLs. 2. Patient will perform LB dressing with supervision & A/E PRN for increased independence with ADLs. 3. Patient will perform toilet transfer with supervision for increased independence with ADLs. 4. Patient will perform all aspects of toileting with supervision for increased independence with ADLs. 5. Patient will independently apply energy conservation techniques with 1 verbal cue(s)for increased independence with ADLs. 6. Patient will perform bathroom mobility with supervision for increased independence/safety with ADLs. Outcome: Progressing Towards Goal   OCCUPATIONAL THERAPY EVALUATION    Patient: Theresa Cates (2 y.o. male)  Date: 6/21/2021  Primary Diagnosis: Osteoarthritis of right hip [M16.11]  Procedure(s) (LRB):  RIGHT TOTAL HIP REPLACEMENT ANTERIOR APPROACH WITH C-ARM (ORTHO DEVELOPMENT) (Right) Day of Surgery   Precautions: Fall, WBAT  PLOF: pt mod I for ADLs/functional mobility    ASSESSMENT AND RECOMMENDATIONS:  Based on the objective data described below, the patient presents with RLE decreased ROM and strength affecting LE ADLs. Pt found seated in recliner chair, vitals assessed and WNL, pt reporting pain 4/10. Pt completed upper body dressing with supervision seated in chair. Educated pt on proper body mechanics s/p THR including adaptive strategies for lower body ADLs. Pt able to thread B feet through underwear/pants without assist, and CGA when standing to pull up to waist. Pt CGA/SBA for STS/bathroom mobility with vc for proper body mechanics. Pt ambulated back to recliner, ice applied to R hip. Spouse present during session for education on home safety.  Provided opportunity for pt to voice questions on ADL performance when home, pt has no further concerns. Patient will benefit from skilled Occupational Therapy intervention to maximize safety/independence with ADLs at d/c.    Education: Reviewed home safety, body mechanics, importance of moving every hour to prevent joint stiffness, role of ice for edema/pain control, Rolling Walker management/safety, and adaptive dressing techniques with patient verbalizing  understanding at this time     Patient will benefit from skilled intervention to address the above impairments. Patient's rehabilitation potential is considered to be Good  Factors which may influence rehabilitation potential include:   [x]             None noted  []             Mental ability/status  []             Medical condition  []             Home/family situation and support systems  []             Safety awareness  []             Pain tolerance/management  []             Other:        PLAN :  Recommendations and Planned Interventions:   [x]               Self Care Training                  [x]      Therapeutic Activities  [x]               Functional Mobility Training   []      Cognitive Retraining  []               Therapeutic Exercises           []      Endurance Activities  []               Balance Training                    []      Neuromuscular Re-Education  []               Visual/Perceptual Training     [x]      Home Safety Training  [x]               Patient Education                   [x]      Family Training/Education  []               Other (comment):    Frequency/Duration: Patient will be followed by Occupational Therapy 1-2 times per day/4-7 days per week to address goals. Discharge Recommendations: Home health with adult supervision at least 24 hours after d/c  Further Equipment Recommendations for Discharge: N/A     SUBJECTIVE:   Patient stated i'm doing alright.     OBJECTIVE DATA SUMMARY:     Past Medical History:   Diagnosis Date    Arthritis     Right hip    ED (erectile dysfunction)     Hypertension     Obesity 5/25/2010    Osteoarthritis of right hip 6/20/2021    Sinuitis     Sleep apnea      Past Surgical History:   Procedure Laterality Date    HX APPENDECTOMY  1987    HX ORTHOPAEDIC Right 1992    knee     Barriers to Learning/Limitations: yes;  physical and altered mental status (i.e.Sedation, Confusion)  Compensate with: visual, verbal, tactile, kinesthetic cues/model    Home Situation/Prior Level of Function:   Home Situation  Home Environment: Private residence  # Steps to Enter: 1  One/Two Story Residence: Two story  # of Interior Steps: 13  Interior Rails: Right  Lift Chair Available: No  Living Alone: No  Support Systems: Spouse/Significant Other/Partner, Family member(s)  Patient Expects to be Discharged to[de-identified] House  Current DME Used/Available at Home: CPAP, Cane, straight, Walker, rolling  Tub or Shower Type: Shower  []  Right hand dominant   []  Left hand dominant    Cognitive/Behavioral Status:  Neurologic State: Alert  Orientation Level: Oriented to person;Oriented to place;Oriented to situation  Cognition: Follows commands  Safety/Judgement: Awareness of environment    Skin: R hip incision w/ Mepilex   Edema: compression hose in place & applied ice     Coordination: BUE  Coordination: Within functional limits  Fine Motor Skills-Upper: Left Intact; Right Intact    Gross Motor Skills-Upper: Left Intact; Right Intact    Balance:  Sitting: Intact  Standing: Intact; With support    Strength: BUE  Strength: Generally decreased, functional    Tone & Sensation:BUE  Tone: Normal  Sensation: Impaired (R hip)    Range of Motion: BUE  AROM: Generally decreased, functional    Functional Mobility and Transfers for ADLs:  Bed Mobility:  Scooting: Stand-by assistance    Transfers:  Sit to Stand: Contact guard assistance (vc)    ADL Assessment:  Feeding: Independent  Oral Facial Hygiene/Grooming: Stand-by assistance  Bathing: Contact guard assistance  Upper Body Dressing: Supervision  Lower Body Dressing: Minimum assistance  Toileting: Stand by assistance    ADL Intervention:  Upper Body Dressing Assistance  Dressing Assistance: Supervision  Pullover Shirt: Supervision    Lower Body Dressing Assistance  Dressing Assistance: Contact guard assistance  Underpants: Contact guard assistance  Pants With Elastic Waist: Contact guard assistance  Leg Crossed Method Used: No  Position Performed: Seated in chair  Cues: Verbal cues provided;Visual cues provided    Cognitive Retraining  Safety/Judgement: Awareness of environment    Pain:  Pain level pre-treatment: 4/10  Pain level post-treatment: 4/10  Pain Intervention(s): Medication administer by Nursing (see MAR); Rest, Ice, Repositioning   Response to intervention: Nurse notified, see doc flow     Activity Tolerance:   Fair. Patient able to stand ~5 minute(s). Patient able to complete ADLs with intermittent rest breaks. Patient limited by pain, strength, ROM. Patient unsteady. Please refer to the flowsheet for vital signs taken during this treatment. After treatment:   [x]  Patient left in no apparent distress sitting up in chair  []  Patient sitting on EOB  []  Patient left in no apparent distress in bed  [x]  Call bell left within reach  [x]  Nursing notified  [x]  Caregiver present  [x]  Ice applied  []  SCD's on while back in bed  [] Bed alarm activated    COMMUNICATION/EDUCATION:   Communication/Collaboration:  [x]       Role of Occupational Therapy in the acute care setting. [x]      Home safety education was provided and the patient/caregiver indicated understanding. [x]      Patient/family have participated as able in goal setting and plan of care. [x]      Patient/family agree to work toward stated goals and plan of care. []      Patient understands intent and goals of therapy, but is neutral about his/her participation. []      Patient is unable to participate in plan of care at this time.     Thank you for this referral.  Jb Triana, OTR/L  Time Calculation: 23 mins    Eval Complexity: History: MEDIUM Complexity : Expanded review of history including physical, cognitive and psychosocial  history ; Examination: LOW Complexity : 1-3 performance deficits relating to physical, cognitive , or psychosocial skils that result in activity limitations and / or participation restrictions ;    Decision Making:LOW Complexity : No comorbidities that affect functional and no verbal or physical assistance needed to complete eval tasks

## 2021-06-21 NOTE — ANESTHESIA PREPROCEDURE EVALUATION
Relevant Problems   CARDIOVASCULAR   (+) Hypertension      ENDOCRINE   (+) Obesity   (+) Severe obesity with body mass index (BMI) of 35.0 to 39.9 with serious comorbidity (HCC)       Anesthetic History   No history of anesthetic complications            Review of Systems / Medical History  Patient summary reviewed, nursing notes reviewed and pertinent labs reviewed    Pulmonary  Within defined limits      Sleep apnea      Pertinent negatives: No smoker     Neuro/Psych   Within defined limits           Cardiovascular    Hypertension          Hyperlipidemia         GI/Hepatic/Renal  Within defined limits           Pertinent negatives: No GERD, liver disease and renal disease   Endo/Other        Morbid obesity and arthritis     Other Findings            Physical Exam    Airway  Mallampati: I  TM Distance: 4 - 6 cm  Neck ROM: normal range of motion   Mouth opening: Normal     Cardiovascular               Dental  No notable dental hx       Pulmonary                 Abdominal  GI exam deferred       Other Findings            Anesthetic Plan    ASA: 2  Anesthesia type: general and regional          Induction: Intravenous  Anesthetic plan and risks discussed with: Patient      Risk of a block include nerve injury, bleeding, infection, and failure as the most common ones although they rare.   Hernan block with ga/lma

## 2021-06-21 NOTE — PROGRESS NOTES
Same Day Discharge     1427- Patient arrives to unit at this time. Dual skin assessment completed with Claudene Ruffing., RN no abnormalities noted other than surgical incision to right hip. Patient is A/O x 4, able to transfer to recliner. Lungs clear, radial pulses present , pedal pulses present , abdomen soft and non-distended. Bowel sounds active, 18 G IV to RFA,  intact and patent infusing. No signs of phlebitis or infiltration noted. TEDS bilaterally. Skin warm and dry  with mepilex dressing to right hip CDI. Patient oriented to room to include use of call bell, meal ordering, and use of incentive spirometer, patient able to get IS to 3500. Patient instructed to order lunch and given explanation of home for dinner plan. Phone and call bell left within reach. Educated on pain medication availability and possible side effects, as well as need to call for assistance before getting out of bed. Patient verbalized understanding.       Symptoms present on arrival:  [] Pain 0/10   [] Medicated  [] Nausea   [] Medicated   [] Hypotension/Hypertension   [] Provider notified

## 2021-06-21 NOTE — PERIOP NOTES
TRANSFER - OUT REPORT:    Verbal report given to Rocky Bence RN(name) on Abhi Shows  being transferred to [address] [place] [address](unit) for routine post - op       Report consisted of patients Situation, Background, Assessment and   Recommendations(SBAR). Information from the following report(s) SBAR, OR Summary, Procedure Summary, Intake/Output and MAR was reviewed with the receiving nurse. Lines:   Peripheral IV 06/21/21 Right Forearm (Active)   Site Assessment Clean, dry, & intact 06/21/21 1320   Phlebitis Assessment 0 06/21/21 1320   Infiltration Assessment 0 06/21/21 1320   Dressing Status Clean, dry, & intact 06/21/21 1320   Dressing Type Tape;Transparent 06/21/21 1320   Hub Color/Line Status Green; Infusing;Patent 06/21/21 1320        Opportunity for questions and clarification was provided.       Patient transported with:   O2 @ 3 liters  Registered Nurse

## 2021-06-21 NOTE — OP NOTES
[address] [address],[address]  Total Hip Arthroplasty      Patient: Alexander Esquivel MRN: [id number]  SSN: [id number]    YOB: 1975  Age: 50 y.o. Sex: male      Date of Surgery: 6/21/2021   Preoperative Diagnosis: RIGHT HIP OSTEOARTHRITIS   Postoperative Diagnosis: RIGHT HIP OSTEOARTHRITIS   Location: [facility]  Surgeon: Mane Valenzuela MD  Assistant: lAice Boyd PA-C    Anesthesia: general    Procedure: Total Right Hip Arthroplasty    Findings: Degenerative joint disease of the right hip. Estimated Blood Loss: 300ml    Specimens: None    Complications: none    Implants:   Implant Name Type Inv. Item Serial No.  Lot No. LRB No. Used Action   LINER ACET 56-58 MM NEUT XL 36 MM HIP UHMWPE PAYTON - [id number]  LINER ACET 56-58 MM NEUT XL 36 MM HIP UHMWPE PAYTON  [employer] [id number] Right 1 Implanted   SHELL ACET 3 HOLE 58-D HIP LOGICAL G-SERIES - [id number]  SHELL ACET 3 HOLE 58-D HIP LOGICAL G-SERIES  [employer] [id number] Right 1 Implanted   CERAMIC FEMORAL HEAD    [employer] [id number] Right 1 Implanted   STEM FEM COAT [id number] - [id number]  STEM FEM COAT [id number]  [employer] [id number] Right 1 Implanted       Procedure Detail:  After the patient was brought to the operating suite, He was effectively anesthetized using general anesthesia, then transferred to the [place] table and secured in a standard fashion. His right hip was then prepped and draped in a normal sterile orthopedic fashion. He was given appropriate intravenous antibiotics preoperatively. After a proper timeout was performed, a direct anterior approach to the hip was performed using a short Celaya-Hinds interval. Anterior capsulotomy was performed. The degenerative changes of the hip were noted. Femoral neck osteotomy was then performed to the templated area. The head and neck were removed. The pulvinar and labrum were excised.  The acetabulum was then reamed up to 58 mm with good bleeding cancellous bone obtained. The cup was then irrigated with pulse lavage system. A 58 mm Signature Logical G cup was then impacted in place with excellent stable fixation obtained, placing the cup at about 45 degrees of abduction, 20 degrees of anteversion. The liner was then impacted in place. A screw was not placed. Attention was turned to the femur, which was delivered into the wound with a combination of extension, external rotation, and adduction, and using the hook on the [place] table to deliver the femur into the wound. The canal was broached up to a size 6 for the Spartan stem system with excellent stable fixation obtained. A trial reduction was then performed with the standard neck offset and 36 mm head balls with various neck lengths. With the +0, he appeared to have equalization of leg lengths and restoration of offset radiographically using the c-arm and joint point navigation system, and excellent functional stability was noted. The trial broach was removed. The canal was irrigated with the pulse lavage system. The final components were impacted in place with excellent stable fixation obtained once again. The final reduction was performed and once again leg lengths and offset were restored radiographically, using the C-arm radiographically intraoperatively, and excellent functional stability was noted. The wound was then irrigated one more time, and then closed in layers. The fascia of the tensor was closed with #1 Vicryl in a running type stitch. Subcutaneous tissue was closed with 2-0 Vicryl in a simple buried stitch, and the skin was closed with Prineo. Dry, sterile dressing was then applied. He tolerated this well, was transferred to the bed, and taken to recovery room, extubated, in stable condition. All sponge and needle counts were correct.     Signed By: Mane Valenzuela MD     June 21, 2021

## 2021-06-21 NOTE — INTERVAL H&P NOTE
Update History & Physical 
 
The Patient's History and Physical of June 20, 2021 was reviewed with the patient and I examined the patient. There was no change. The surgical site was confirmed by the patient and me. Plan:  The risk, benefits, expected outcome, and alternative to the recommended procedure have been discussed with the patient. Patient understands and wants to proceed with the procedure.  
 
Electronically signed by Kael Hernandez MD on 6/21/2021 at 10:43 AM

## 2021-06-21 NOTE — PROGRESS NOTES
Progression of Symptoms:    [] Pain 4/10   [] Improvement post medication administration   [] No improvement, provider notified   [] Nausea   [] Improvement post medication administration   [] No improvement, provider notified   [] Hypotension/Hypertension   [] Improved post medication administration   [] No improvement, provider notified     Readiness for Discharge:  [x] Vital signs stable  [] + Voiding  [x] Wound intact, drainage minimal  [x] Tolerating PO intake  [x] Cleared by PT (OT if applicable)  [x] Discharge education completed with patient and family member to specifically include   [x] Recommended stool softener  [x] Proper elevation visual demonstration

## 2021-06-21 NOTE — PROGRESS NOTES
Patient transferred to [address] [place] [address]     06/21/21 1414   Vital Signs   Temp 97.7 °F (36.5 °C)   Temp Source Oral   Pulse (Heart Rate) 67   Resp Rate 14   /71   Oxygen Therapy   O2 Sat (%) 100 %   Pulse via Oximetry 67 beats per minute

## 2021-06-21 NOTE — ANESTHESIA POSTPROCEDURE EVALUATION
Procedure(s):  RIGHT TOTAL HIP REPLACEMENT ANTERIOR APPROACH WITH C-ARM (ORTHO DEVELOPMENT). general, regional    Anesthesia Post Evaluation        Comments: Post-Anesthesia Evaluation and Assessment    Cardiovascular Function/Vital Signs  /66   Pulse 65   Temp 36.8 °C (98.2 °F)   Resp 10   Ht 6' 4\" (1.93 m)   Wt 126.6 kg (279 lb 3.2 oz)   SpO2 100%   BMI 33.99 kg/m²     Patient is status post Procedure(s):  RIGHT TOTAL HIP REPLACEMENT ANTERIOR APPROACH WITH C-ARM (ORTHO DEVELOPMENT). Nausea/Vomiting: Controlled. Postoperative hydration reviewed and adequate. Pain:  Pain Scale 1: Visual (06/21/21 1320)  Pain Intensity 1: 0 (06/21/21 1320)   Managed. Neurological Status:   Neuro (WDL): Within Defined Limits (06/21/21 1320)   At baseline. Mental Status and Level of Consciousness: Arousable. Pulmonary Status:   O2 Device: Nasal cannula (06/21/21 1320)   Adequate oxygenation and airway patent. Complications related to anesthesia: None    Post-anesthesia assessment completed. No concerns. Patient has met all discharge requirements. Signed By: Illene Heimlich, MD    June 21, 2021                   INITIAL Post-op Vital signs:   Vitals Value Taken Time   /68 06/21/21 1340   Temp 36.8 °C (98.2 °F) 06/21/21 1320   Pulse 65 06/21/21 1343   Resp 10 06/21/21 1343   SpO2 100 % 06/21/21 1343   Vitals shown include unvalidated device data.

## 2021-06-22 ENCOUNTER — HOME CARE VISIT (OUTPATIENT)
Dept: SCHEDULING | Facility: HOME HEALTH | Age: 46
End: 2021-06-22
Payer: COMMERCIAL

## 2021-06-22 VITALS
HEIGHT: 76 IN | SYSTOLIC BLOOD PRESSURE: 138 MMHG | DIASTOLIC BLOOD PRESSURE: 80 MMHG | HEART RATE: 65 BPM | RESPIRATION RATE: 18 BRPM | OXYGEN SATURATION: 96 % | WEIGHT: 275 LBS | TEMPERATURE: 99.1 F | BODY MASS INDEX: 33.49 KG/M2

## 2021-06-22 PROCEDURE — 400013 HH SOC

## 2021-06-22 PROCEDURE — A6212 FOAM DRG <=16 SQ IN W/BORDER: HCPCS

## 2021-06-22 PROCEDURE — G0151 HHCP-SERV OF PT,EA 15 MIN: HCPCS

## 2021-06-23 ENCOUNTER — HOME CARE VISIT (OUTPATIENT)
Dept: HOME HEALTH SERVICES | Facility: HOME HEALTH | Age: 46
End: 2021-06-23
Payer: COMMERCIAL

## 2021-06-23 ENCOUNTER — HOME CARE VISIT (OUTPATIENT)
Dept: SCHEDULING | Facility: HOME HEALTH | Age: 46
End: 2021-06-23
Payer: COMMERCIAL

## 2021-06-23 PROCEDURE — G0157 HHC PT ASSISTANT EA 15: HCPCS

## 2021-06-25 ENCOUNTER — HOME CARE VISIT (OUTPATIENT)
Dept: SCHEDULING | Facility: HOME HEALTH | Age: 46
End: 2021-06-25
Payer: COMMERCIAL

## 2021-06-25 PROCEDURE — G0157 HHC PT ASSISTANT EA 15: HCPCS

## 2021-06-27 VITALS
SYSTOLIC BLOOD PRESSURE: 132 MMHG | OXYGEN SATURATION: 98 % | TEMPERATURE: 98.1 F | RESPIRATION RATE: 17 BRPM | HEART RATE: 70 BPM | OXYGEN SATURATION: 98 % | RESPIRATION RATE: 18 BRPM | DIASTOLIC BLOOD PRESSURE: 90 MMHG | HEART RATE: 75 BPM | DIASTOLIC BLOOD PRESSURE: 80 MMHG | SYSTOLIC BLOOD PRESSURE: 134 MMHG | TEMPERATURE: 98 F

## 2021-06-28 ENCOUNTER — HOME CARE VISIT (OUTPATIENT)
Dept: SCHEDULING | Facility: HOME HEALTH | Age: 46
End: 2021-06-28
Payer: COMMERCIAL

## 2021-06-28 PROCEDURE — G0157 HHC PT ASSISTANT EA 15: HCPCS

## 2021-06-29 VITALS
OXYGEN SATURATION: 99 % | SYSTOLIC BLOOD PRESSURE: 119 MMHG | DIASTOLIC BLOOD PRESSURE: 84 MMHG | TEMPERATURE: 98 F | HEART RATE: 75 BPM | RESPIRATION RATE: 18 BRPM

## 2021-06-30 ENCOUNTER — HOME CARE VISIT (OUTPATIENT)
Dept: SCHEDULING | Facility: HOME HEALTH | Age: 46
End: 2021-06-30
Payer: COMMERCIAL

## 2021-06-30 PROCEDURE — G0157 HHC PT ASSISTANT EA 15: HCPCS

## 2021-07-02 ENCOUNTER — HOME CARE VISIT (OUTPATIENT)
Dept: SCHEDULING | Facility: HOME HEALTH | Age: 46
End: 2021-07-02
Payer: COMMERCIAL

## 2021-07-02 PROCEDURE — G0157 HHC PT ASSISTANT EA 15: HCPCS

## 2021-07-05 VITALS
DIASTOLIC BLOOD PRESSURE: 80 MMHG | SYSTOLIC BLOOD PRESSURE: 141 MMHG | TEMPERATURE: 97.8 F | DIASTOLIC BLOOD PRESSURE: 78 MMHG | HEART RATE: 64 BPM | HEART RATE: 70 BPM | RESPIRATION RATE: 18 BRPM | OXYGEN SATURATION: 99 % | SYSTOLIC BLOOD PRESSURE: 142 MMHG | OXYGEN SATURATION: 98 % | TEMPERATURE: 98.2 F | RESPIRATION RATE: 18 BRPM

## 2021-07-07 ENCOUNTER — HOME CARE VISIT (OUTPATIENT)
Dept: SCHEDULING | Facility: HOME HEALTH | Age: 46
End: 2021-07-07
Payer: COMMERCIAL

## 2021-07-07 VITALS
SYSTOLIC BLOOD PRESSURE: 148 MMHG | DIASTOLIC BLOOD PRESSURE: 86 MMHG | OXYGEN SATURATION: 96 % | HEART RATE: 75 BPM | TEMPERATURE: 98.2 F | RESPIRATION RATE: 16 BRPM

## 2021-07-07 PROCEDURE — G0151 HHCP-SERV OF PT,EA 15 MIN: HCPCS

## 2021-08-20 DIAGNOSIS — E66.01 CLASS 2 SEVERE OBESITY DUE TO EXCESS CALORIES WITH SERIOUS COMORBIDITY AND BODY MASS INDEX (BMI) OF 36.0 TO 36.9 IN ADULT (HCC): ICD-10-CM

## 2021-08-20 RX ORDER — PHENTERMINE HYDROCHLORIDE 37.5 MG/1
37.5 TABLET ORAL
Qty: 30 TABLET | Refills: 0 | Status: SHIPPED | OUTPATIENT
Start: 2021-08-20 | End: 2021-09-29 | Stop reason: SDUPTHER

## 2021-08-20 NOTE — TELEPHONE ENCOUNTER
[facility] last [facility] date for Adipex as 6/9/21 for a 30 d/s. Last Visit: 6/9/21 with MD Zarate  Next Appointment: 9/7/21 with MD Zarate  Previous Refill Encounter(s): 6/9/21 #30    Requested Prescriptions     Pending Prescriptions Disp Refills    phentermine (ADIPEX-P) 37.5 mg tablet 30 Tablet 0     Sig: Take 1 Tablet by mouth every morning.  Max Daily Amount: 37.5 mg.

## 2021-08-20 NOTE — TELEPHONE ENCOUNTER
Requested Prescriptions     Pending Prescriptions Disp Refills    phentermine (ADIPEX-P) 37.5 mg tablet 30 Tablet 0     Sig: Take 1 Tablet by mouth every morning.  Max Daily Amount: 37.5 mg.

## 2021-09-01 ENCOUNTER — APPOINTMENT (OUTPATIENT)
Dept: INTERNAL MEDICINE CLINIC | Age: 46
End: 2021-09-01

## 2021-09-01 DIAGNOSIS — Z12.5 PROSTATE CANCER SCREENING: ICD-10-CM

## 2021-09-01 DIAGNOSIS — I10 ESSENTIAL HYPERTENSION: ICD-10-CM

## 2021-09-01 DIAGNOSIS — E78.9 BORDERLINE HIGH CHOLESTEROL: ICD-10-CM

## 2021-09-02 LAB
A-G RATIO,AGRAT: 1.4 RATIO (ref 1.1–2.6)
ALBUMIN SERPL-MCNC: 4.4 G/DL (ref 3.5–5)
ALP SERPL-CCNC: 66 U/L (ref 25–115)
ALT SERPL-CCNC: 20 U/L (ref 5–40)
ANION GAP SERPL CALC-SCNC: 12 MMOL/L (ref 3–15)
AST SERPL W P-5'-P-CCNC: 16 U/L (ref 10–37)
BILIRUB SERPL-MCNC: 0.6 MG/DL (ref 0.2–1.2)
BUN SERPL-MCNC: 16 MG/DL (ref 6–22)
CALCIUM SERPL-MCNC: 9.6 MG/DL (ref 8.4–10.5)
CHLORIDE SERPL-SCNC: 104 MMOL/L (ref 98–110)
CHOLEST SERPL-MCNC: 180 MG/DL (ref 110–200)
CO2 SERPL-SCNC: 25 MMOL/L (ref 20–32)
CREAT SERPL-MCNC: 1 MG/DL (ref 0.5–1.2)
GFRAA, 66117: >60
GFRNA, 66118: >60
GLOBULIN,GLOB: 3.1 G/DL (ref 2–4)
GLUCOSE SERPL-MCNC: 103 MG/DL (ref 70–99)
HDLC SERPL-MCNC: 4 MG/DL (ref 0–5)
HDLC SERPL-MCNC: 45 MG/DL
LDL/HDL RATIO,LDHD: 2.3
LDLC SERPL CALC-MCNC: 105 MG/DL (ref 50–99)
NON-HDL CHOLESTEROL, 011976: 135 MG/DL
POTASSIUM SERPL-SCNC: 4.2 MMOL/L (ref 3.5–5.5)
PROT SERPL-MCNC: 7.5 G/DL (ref 6.4–8.3)
PSA SERPL-MCNC: 0.53 NG/ML
SODIUM SERPL-SCNC: 141 MMOL/L (ref 133–145)
TRIGL SERPL-MCNC: 152 MG/DL (ref 40–149)
VLDLC SERPL CALC-MCNC: 30 MG/DL (ref 8–30)

## 2021-09-07 ENCOUNTER — OFFICE VISIT (OUTPATIENT)
Dept: INTERNAL MEDICINE CLINIC | Age: 46
End: 2021-09-07
Payer: COMMERCIAL

## 2021-09-07 VITALS
WEIGHT: 283 LBS | TEMPERATURE: 98.1 F | DIASTOLIC BLOOD PRESSURE: 92 MMHG | HEIGHT: 76 IN | BODY MASS INDEX: 34.46 KG/M2 | HEART RATE: 94 BPM | OXYGEN SATURATION: 100 % | SYSTOLIC BLOOD PRESSURE: 153 MMHG | RESPIRATION RATE: 20 BRPM

## 2021-09-07 DIAGNOSIS — L21.9 SEBORRHEIC DERMATITIS: ICD-10-CM

## 2021-09-07 DIAGNOSIS — Z12.11 COLON CANCER SCREENING: ICD-10-CM

## 2021-09-07 DIAGNOSIS — I10 ESSENTIAL HYPERTENSION: Primary | ICD-10-CM

## 2021-09-07 DIAGNOSIS — B36.0 TINEA VERSICOLOR: ICD-10-CM

## 2021-09-07 DIAGNOSIS — E66.09 CLASS 1 OBESITY DUE TO EXCESS CALORIES WITH SERIOUS COMORBIDITY AND BODY MASS INDEX (BMI) OF 34.0 TO 34.9 IN ADULT: ICD-10-CM

## 2021-09-07 PROCEDURE — 99213 OFFICE O/P EST LOW 20 MIN: CPT | Performed by: INTERNAL MEDICINE

## 2021-09-07 RX ORDER — TRIAMCINOLONE ACETONIDE 1 MG/G
CREAM TOPICAL
Qty: 30 G | Refills: 2 | Status: SHIPPED | OUTPATIENT
Start: 2021-09-07 | End: 2022-01-11 | Stop reason: SDUPTHER

## 2021-09-07 RX ORDER — KETOCONAZOLE 20 MG/ML
SHAMPOO TOPICAL
Qty: 120 ML | Refills: 2 | Status: SHIPPED | OUTPATIENT
Start: 2021-09-07 | End: 2022-10-04 | Stop reason: SDUPTHER

## 2021-09-07 NOTE — PROGRESS NOTES
Patient is in the office today for a 6 month follow up. 1. Have you been to the ER, urgent care clinic since your last visit? Hospitalized since your last visit? No    2. Have you seen or consulted any other health care providers outside of the [address] [place] [address] since your last visit? Include any pap smears or colon screening. yes, Dr. Morse Duverney ortho.

## 2021-09-07 NOTE — PATIENT INSTRUCTIONS
High Blood Pressure: Care Instructions  Overview     It's normal for blood pressure to go up and down throughout the day. But if it stays up, you have high blood pressure. Another name for high blood pressure is hypertension. Despite what a lot of people think, high blood pressure usually doesn't cause headaches or make you feel dizzy or lightheaded. It usually has no symptoms. But it does increase your risk of stroke, heart attack, and other problems. You and your doctor will talk about your risks of these problems based on your blood pressure. Your doctor will give you a goal for your blood pressure. Your goal will be based on your health and your age. Lifestyle changes, such as eating healthy and being active, are always important to help lower blood pressure. You might also take medicine to reach your blood pressure goal.  Follow-up care is a key part of your treatment and safety. Be sure to make and go to all appointments, and call your doctor if you are having problems. It's also a good idea to know your test results and keep a list of the medicines you take. How can you care for yourself at home? Medical treatment  · If you stop taking your medicine, your blood pressure will go back up. You may take one or more types of medicine to lower your blood pressure. Be safe with medicines. Take your medicine exactly as prescribed. Call your doctor if you think you are having a problem with your medicine. · Talk to your doctor before you start taking aspirin every day. Aspirin can help certain people lower their risk of a heart attack or stroke. But taking aspirin isn't right for everyone, because it can cause serious bleeding. · See your doctor regularly. You may need to see the doctor more often at first or until your blood pressure comes down. · If you are taking blood pressure medicine, talk to your doctor before you take decongestants or anti-inflammatory medicine, such as ibuprofen.  Some of these medicines can raise blood pressure. · Learn how to check your blood pressure at home. Lifestyle changes  · Stay at a healthy weight. This is especially important if you put on weight around the waist. Losing even 10 pounds can help you lower your blood pressure. · If your doctor recommends it, get more exercise. Walking is a good choice. Bit by bit, increase the amount you walk every day. Try for at least 30 minutes on most days of the week. You also may want to swim, bike, or do other activities. · Avoid or limit alcohol. Talk to your doctor about whether you can drink any alcohol. · Try to limit how much sodium you eat to less than 2,300 milligrams (mg) a day. Your doctor may ask you to try to eat less than 1,500 mg a day. · Eat plenty of fruits (such as bananas and oranges), vegetables, legumes, whole grains, and low-fat dairy products. · Lower the amount of saturated fat in your diet. Saturated fat is found in animal products such as milk, cheese, and meat. Limiting these foods may help you lose weight and also lower your risk for heart disease. · Do not smoke. Smoking increases your risk for heart attack and stroke. If you need help quitting, talk to your doctor about stop-smoking programs and medicines. These can increase your chances of quitting for good. When should you call for help? Call  911 anytime you think you may need emergency care. This may mean having symptoms that suggest that your blood pressure is causing a serious heart or blood vessel problem. Your blood pressure may be over 180/120. For example, call 911 if:    · You have symptoms of a heart attack. These may include:  ? Chest pain or pressure, or a strange feeling in the chest.  ? Sweating. ? Shortness of breath. ? Nausea or vomiting. ? Pain, pressure, or a strange feeling in the back, neck, jaw, or upper belly or in one or both shoulders or arms. ? Lightheadedness or sudden weakness.   ? A fast or irregular heartbeat.     · You have symptoms of a stroke. These may include:  ? Sudden numbness, tingling, weakness, or loss of movement in your face, arm, or leg, especially on only one side of your body. ? Sudden vision changes. ? Sudden trouble speaking. ? Sudden confusion or trouble understanding simple statements. ? Sudden problems with walking or balance. ? A sudden, severe headache that is different from past headaches.     · You have severe back or belly pain. Do not wait until your blood pressure comes down on its own. Get help right away. Call your doctor now or seek immediate care if:    · Your blood pressure is much higher than normal (such as 180/120 or higher), but you don't have symptoms.     · You think high blood pressure is causing symptoms, such as:  ? Severe headache.  ? Blurry vision. Watch closely for changes in your health, and be sure to contact your doctor if:    · Your blood pressure measures higher than your doctor recommends at least 2 times. That means the top number is higher or the bottom number is higher, or both.     · You think you may be having side effects from your blood pressure medicine. Where can you learn more? Go to http://www.gray.com/  Enter [id number] in the search box to learn more about \"High Blood Pressure: Care Instructions. \"  Current as of: August 31, 2020               Content Version: 12.8  © 2006-2021 [employer]. Care instructions adapted under license by [employer] (which disclaims liability or warranty for this information). If you have questions about a medical condition or this instruction, always ask your healthcare professional. [place] [address] any warranty or liability for your use of this information.

## 2021-09-07 NOTE — PROGRESS NOTES
Angélica Kiran is a 48 y.o.  male and presents with Hypertension (f/u) and Obesity      SUBJECTIVE:    Cardiovascular Review:  The patient has hypertension and obesity. Diet and Lifestyle: generally follows a low fat low cholesterol diet, exercises sporadically  Home BP Monitoring: is poorly controlled at home with -150/90's. Pt says he is compliant with taking his BP meds  Pertinent ROS: taking medications as instructed, no medication side effects noted, no TIA's, no chest pain on exertion, no dyspnea on exertion, no swelling of ankles. Pt continues to struggle with losing weight. He is using Adipex to try and suppress his appetite. Contrave did not work for him. Pt advised to cut back starches and sugar and increase protein in his diet to try and lose 25 -50 lbs. Patient was diagnosed with sleep apnea and should be using using a CPAP. Patient status post recent right hip replacement. Respiratory ROS: negative for - shortness of breath  Cardiovascular ROS: negative for - chest pain    Current Outpatient Medications   Medication Sig    ketoconazole (NIZORAL) 2 % shampoo Apply quarter size daily as needed    triamcinolone acetonide (KENALOG) 0.1 % topical cream use thin layer    phentermine (ADIPEX-P) 37.5 mg tablet Take 1 Tablet by mouth every morning. Max Daily Amount: 37.5 mg.    acetaminophen (TYLENOL) 500 mg tablet Take 1,000 mg by mouth every six (6) hours as needed for Pain.  spironolactone (ALDACTONE) 25 mg tablet Take 1 Tablet by mouth daily.  amLODIPine-valsartan (EXFORGE)  mg per tablet TAKE 1 TABLET BY MOUTH EVERY DAY    multivitamin (ONE A DAY) tablet Take 1 Tab by mouth daily.  triamcinolone acetonide (KENALOG) 0.1 % topical cream Apply  to affected area two (2) times a day. Apply dime size to affected area daily for 1 week as needed     No current facility-administered medications for this visit.          OBJECTIVE:  alert, well appearing, and in no distress  Visit Vitals  BP (!) 153/92 (BP 1 Location: Left arm, BP Patient Position: Sitting, BP Cuff Size: Adult)   Pulse 94   Temp 98.1 °F (36.7 °C) (Temporal)   Resp 20   Ht 6' 4\" (1.93 m)   Wt 283 lb (128.4 kg)   SpO2 100%   BMI 34.45 kg/m²      well developed and well nourished  Chest - clear to auscultation, no wheezes, rales or rhonchi, symmetric air entry  Heart - normal rate, regular rhythm, normal S1, S2, no murmurs, rubs, clicks or gallops  Extremities - no edema     Labs:   Lab Results   Component Value Date/Time    Cholesterol, total 180 09/01/2021 08:55 AM    HDL Cholesterol 45 09/01/2021 08:55 AM    LDL, calculated 105 (H) 09/01/2021 08:55 AM    Triglyceride 152 (H) 09/01/2021 08:55 AM     Lab Results   Component Value Date/Time    Prostate Specific Ag 0.530 09/01/2021 08:55 AM    Prostate Specific Ag 0.4 03/09/2020 04:03 AM    Prostate Specific Ag 0.6 03/29/2019 09:40 AM     Lab Results   Component Value Date/Time    Sodium 141 09/01/2021 08:55 AM    Potassium 4.2 09/01/2021 08:55 AM    Chloride 104 09/01/2021 08:55 AM    CO2 25 09/01/2021 08:55 AM    Anion gap 12.0 09/01/2021 08:55 AM    Glucose 103 (H) 09/01/2021 08:55 AM    BUN 16 09/01/2021 08:55 AM    Creatinine 1.0 09/01/2021 08:55 AM    BUN/Creatinine ratio 13 03/09/2020 04:03 AM    GFR est  03/09/2020 04:03 AM    GFR est non-AA 89 03/09/2020 04:03 AM    Calcium 9.6 09/01/2021 08:55 AM    Bilirubin, total 0.6 09/01/2021 08:55 AM    ALT (SGPT) 20 09/01/2021 08:55 AM    Alk. phosphatase 66 09/01/2021 08:55 AM    Protein, total 7.5 09/01/2021 08:55 AM    Albumin 4.4 09/01/2021 08:55 AM    Globulin 3.1 09/01/2021 08:55 AM    A-G Ratio 1.4 09/01/2021 08:55 AM          Discussed the patient's BMI with him. The BMI follow up plan is as follows: BMI is out of normal parameters and plan is as follows: I have counseled this patient on diet and exercise regimens. Assessment/Plan       ICD-10-CM ICD-9-CM    1.  Essential hypertension  I10 401.9  uncontrolled we will continue on Exforge 10/320 and Aldactone 25 mg and try to lose 10 to 20 pounds if not improving will need to increase Aldactone METABOLIC PANEL, COMPREHENSIVE   2. Class 1 obesity due to excess calories with serious comorbidity and body mass index (BMI) of 34.0 to 34.9 in adult  E66.09 278.00  patient will continue to work on trying to cut back starches and sweets in his diet he can continue Adipex for now    Z68.34 V85.34    3. Tinea versicolor  B36.0 111.0 ketoconazole (NIZORAL) 2 % shampoo   4. Seborrheic dermatitis  L21.9 690.10 triamcinolone acetonide (KENALOG) 0.1 % topical cream   5. Colon cancer screening  Z12.11 V76.51 REFERRAL TO GASTROENTEROLOGY         Follow-up and Dispositions    · Return in about 4 months (around 1/7/2022) for labs 1 week before. Reviewed plan of care. Patient has provided input and agrees with goals.

## 2021-09-29 DIAGNOSIS — E66.01 CLASS 2 SEVERE OBESITY DUE TO EXCESS CALORIES WITH SERIOUS COMORBIDITY AND BODY MASS INDEX (BMI) OF 36.0 TO 36.9 IN ADULT (HCC): ICD-10-CM

## 2021-09-29 RX ORDER — PHENTERMINE HYDROCHLORIDE 37.5 MG/1
37.5 TABLET ORAL
Qty: 30 TABLET | Refills: 0 | Status: SHIPPED | OUTPATIENT
Start: 2021-09-29 | End: 2021-11-17 | Stop reason: SDUPTHER

## 2021-09-29 NOTE — TELEPHONE ENCOUNTER
[facility]21 for a 30 d/s. Last Visit: 9/7/21 with MD Zarate  Next Appointment: 1/11/22 with MD Zarate  Previous Refill Encounter(s): 8/20/21 #30    Requested Prescriptions     Pending Prescriptions Disp Refills    phentermine (ADIPEX-P) 37.5 mg tablet 30 Tablet 0     Sig: Take 1 Tablet by mouth every morning.  Max Daily Amount: 37.5 mg.

## 2021-11-17 DIAGNOSIS — E66.01 CLASS 2 SEVERE OBESITY DUE TO EXCESS CALORIES WITH SERIOUS COMORBIDITY AND BODY MASS INDEX (BMI) OF 36.0 TO 36.9 IN ADULT (HCC): ICD-10-CM

## 2021-11-17 RX ORDER — PHENTERMINE HYDROCHLORIDE 37.5 MG/1
37.5 TABLET ORAL
Qty: 30 TABLET | Refills: 0 | Status: SHIPPED | OUTPATIENT
Start: 2021-11-17 | End: 2022-01-11 | Stop reason: SDUPTHER

## 2021-11-17 RX ORDER — AMLODIPINE AND VALSARTAN 10; 320 MG/1; MG/1
TABLET ORAL
Qty: 90 TABLET | Refills: 2 | Status: SHIPPED | OUTPATIENT
Start: 2021-11-17 | End: 2022-08-31

## 2021-11-17 NOTE — TELEPHONE ENCOUNTER
[facility] 9[facility]29/21 for a 30 d/s. Last Visit: 9/7/21 with MD Zarate  Next Appointment: 1/11/22 with MD Zarate  Previous Refill Encounter(s): 3/4/21 Exforge #90 with 2 refills, 9/29/21 Adipex #30    Requested Prescriptions     Pending Prescriptions Disp Refills    amLODIPine-valsartan (EXFORGE)  mg per tablet 90 Tablet 2     Sig: TAKE 1 TABLET BY MOUTH EVERY DAY    phentermine (ADIPEX-P) 37.5 mg tablet 30 Tablet 0     Sig: Take 1 Tablet by mouth every morning.  Max Daily Amount: 37.5 mg.

## 2021-11-17 NOTE — TELEPHONE ENCOUNTER
Requested Prescriptions     Pending Prescriptions Disp Refills    amLODIPine-valsartan (EXFORGE)  mg per tablet 90 Tablet 2     Sig: TAKE 1 TABLET BY MOUTH EVERY DAY    phentermine (ADIPEX-P) 37.5 mg tablet 30 Tablet 0     Sig: Take 1 Tablet by mouth every morning.  Max Daily Amount: 37.5 mg.

## 2022-01-03 ENCOUNTER — TELEPHONE (OUTPATIENT)
Dept: INTERNAL MEDICINE CLINIC | Age: 47
End: 2022-01-03

## 2022-01-03 NOTE — TELEPHONE ENCOUNTER
----- Message from Pierre Canavan sent at 1/3/2022  3:38 PM EST -----  Subject: Message to Provider    QUESTIONS  Information for Provider? patient requesting call in regards to r/s lab   visit   ---------------------------------------------------------------------------  --------------  [address]  What is the best way for the office to contact you? OK to leave message on   voicemail  Preferred Call Back Phone Number? [phone]  ---------------------------------------------------------------------------  --------------  SCRIPT ANSWERS  Relationship to Patient?  Self

## 2022-01-06 ENCOUNTER — APPOINTMENT (OUTPATIENT)
Dept: INTERNAL MEDICINE CLINIC | Age: 47
End: 2022-01-06

## 2022-01-07 LAB
A-G RATIO,AGRAT: 1.7 RATIO (ref 1.1–2.6)
ALBUMIN SERPL-MCNC: 4.5 G/DL (ref 3.5–5)
ALP SERPL-CCNC: 55 U/L (ref 25–115)
ALT SERPL-CCNC: 30 U/L (ref 5–40)
ANION GAP SERPL CALC-SCNC: 10 MMOL/L (ref 3–15)
AST SERPL W P-5'-P-CCNC: 21 U/L (ref 10–37)
BILIRUB SERPL-MCNC: 0.5 MG/DL (ref 0.2–1.2)
BUN SERPL-MCNC: 15 MG/DL (ref 6–22)
CALCIUM SERPL-MCNC: 9.5 MG/DL (ref 8.4–10.5)
CHLORIDE SERPL-SCNC: 105 MMOL/L (ref 98–110)
CO2 SERPL-SCNC: 24 MMOL/L (ref 20–32)
CREAT SERPL-MCNC: 0.9 MG/DL (ref 0.5–1.2)
GFRAA, 66117: >60
GFRNA, 66118: >60
GLOBULIN,GLOB: 2.7 G/DL (ref 2–4)
GLUCOSE SERPL-MCNC: 90 MG/DL (ref 70–99)
POTASSIUM SERPL-SCNC: 4.5 MMOL/L (ref 3.5–5.5)
PROT SERPL-MCNC: 7.2 G/DL (ref 6.4–8.3)
SODIUM SERPL-SCNC: 139 MMOL/L (ref 133–145)

## 2022-01-09 DIAGNOSIS — I10 ESSENTIAL HYPERTENSION: ICD-10-CM

## 2022-01-11 ENCOUNTER — OFFICE VISIT (OUTPATIENT)
Dept: INTERNAL MEDICINE CLINIC | Age: 47
End: 2022-01-11
Payer: COMMERCIAL

## 2022-01-11 VITALS
RESPIRATION RATE: 20 BRPM | HEART RATE: 73 BPM | BODY MASS INDEX: 35.31 KG/M2 | WEIGHT: 290 LBS | HEIGHT: 76 IN | OXYGEN SATURATION: 97 % | SYSTOLIC BLOOD PRESSURE: 143 MMHG | TEMPERATURE: 97.8 F | DIASTOLIC BLOOD PRESSURE: 91 MMHG

## 2022-01-11 DIAGNOSIS — I10 ESSENTIAL HYPERTENSION: Primary | ICD-10-CM

## 2022-01-11 DIAGNOSIS — L21.9 SEBORRHEIC DERMATITIS: ICD-10-CM

## 2022-01-11 DIAGNOSIS — E66.01 CLASS 2 SEVERE OBESITY DUE TO EXCESS CALORIES WITH SERIOUS COMORBIDITY AND BODY MASS INDEX (BMI) OF 35.0 TO 35.9 IN ADULT (HCC): ICD-10-CM

## 2022-01-11 PROCEDURE — 99213 OFFICE O/P EST LOW 20 MIN: CPT | Performed by: INTERNAL MEDICINE

## 2022-01-11 RX ORDER — TRIAMCINOLONE ACETONIDE 1 MG/G
CREAM TOPICAL
Qty: 30 G | Refills: 2 | Status: SHIPPED | OUTPATIENT
Start: 2022-01-11

## 2022-01-11 RX ORDER — MELOXICAM 15 MG/1
15 TABLET ORAL DAILY
Qty: 90 TABLET | Refills: 1 | Status: SHIPPED | OUTPATIENT
Start: 2022-01-11 | End: 2022-07-23

## 2022-01-11 RX ORDER — SPIRONOLACTONE 25 MG/1
25 TABLET ORAL DAILY
Qty: 90 TABLET | Refills: 2 | Status: SHIPPED | OUTPATIENT
Start: 2022-01-11 | End: 2022-10-04 | Stop reason: SDUPTHER

## 2022-01-11 RX ORDER — PHENTERMINE HYDROCHLORIDE 37.5 MG/1
37.5 TABLET ORAL
Qty: 30 TABLET | Refills: 0 | Status: SHIPPED | OUTPATIENT
Start: 2022-01-11 | End: 2022-02-25 | Stop reason: SDUPTHER

## 2022-01-11 NOTE — PROGRESS NOTES
Patient is in the office today for a 3 month follow up. Patient is asking for a refill on Meloxicam that was originally prescribed by Dr. Mark De La Fuente. 1. \"Have you been to the ER, urgent care clinic since your last visit? Hospitalized since your last visit? \" No    2. \"Have you seen or consulted any other health care providers outside of the [address] [place] [address] since your last visit? \" No     3. For patients aged 39-70: Has the patient had a colonoscopy / FIT/ Cologuard? No     If the patient is female:    4. For patients aged 41-77: Has the patient had a mammogram within the past 2 years? NA based on age or sex    11. For patients aged 21-65: Has the patient had a pap smear?  NA based on age or sex

## 2022-01-11 NOTE — PATIENT INSTRUCTIONS
High Blood Pressure: Care Instructions  Overview     It's normal for blood pressure to go up and down throughout the day. But if it stays up, you have high blood pressure. Another name for high blood pressure is hypertension. Despite what a lot of people think, high blood pressure usually doesn't cause headaches or make you feel dizzy or lightheaded. It usually has no symptoms. But it does increase your risk of stroke, heart attack, and other problems. You and your doctor will talk about your risks of these problems based on your blood pressure. Your doctor will give you a goal for your blood pressure. Your goal will be based on your health and your age. Lifestyle changes, such as eating healthy and being active, are always important to help lower blood pressure. You might also take medicine to reach your blood pressure goal.  Follow-up care is a key part of your treatment and safety. Be sure to make and go to all appointments, and call your doctor if you are having problems. It's also a good idea to know your test results and keep a list of the medicines you take. How can you care for yourself at home? Medical treatment  · If you stop taking your medicine, your blood pressure will go back up. You may take one or more types of medicine to lower your blood pressure. Be safe with medicines. Take your medicine exactly as prescribed. Call your doctor if you think you are having a problem with your medicine. · Talk to your doctor before you start taking aspirin every day. Aspirin can help certain people lower their risk of a heart attack or stroke. But taking aspirin isn't right for everyone, because it can cause serious bleeding. · See your doctor regularly. You may need to see the doctor more often at first or until your blood pressure comes down. · If you are taking blood pressure medicine, talk to your doctor before you take decongestants or anti-inflammatory medicine, such as ibuprofen.  Some of these medicines can raise blood pressure. · Learn how to check your blood pressure at home. Lifestyle changes  · Stay at a healthy weight. This is especially important if you put on weight around the waist. Losing even 10 pounds can help you lower your blood pressure. · If your doctor recommends it, get more exercise. Walking is a good choice. Bit by bit, increase the amount you walk every day. Try for at least 30 minutes on most days of the week. You also may want to swim, bike, or do other activities. · Avoid or limit alcohol. Talk to your doctor about whether you can drink any alcohol. · Try to limit how much sodium you eat to less than 2,300 milligrams (mg) a day. Your doctor may ask you to try to eat less than 1,500 mg a day. · Eat plenty of fruits (such as bananas and oranges), vegetables, legumes, whole grains, and low-fat dairy products. · Lower the amount of saturated fat in your diet. Saturated fat is found in animal products such as milk, cheese, and meat. Limiting these foods may help you lose weight and also lower your risk for heart disease. · Do not smoke. Smoking increases your risk for heart attack and stroke. If you need help quitting, talk to your doctor about stop-smoking programs and medicines. These can increase your chances of quitting for good. When should you call for help? Call 911  anytime you think you may need emergency care. This may mean having symptoms that suggest that your blood pressure is causing a serious heart or blood vessel problem. Your blood pressure may be over 180/120. For example, call 911 if:    · You have symptoms of a heart attack. These may include:  ? Chest pain or pressure, or a strange feeling in the chest.  ? Sweating. ? Shortness of breath. ? Nausea or vomiting. ? Pain, pressure, or a strange feeling in the back, neck, jaw, or upper belly or in one or both shoulders or arms. ? Lightheadedness or sudden weakness.   ? A fast or irregular heartbeat.     · You have symptoms of a stroke. These may include:  ? Sudden numbness, tingling, weakness, or loss of movement in your face, arm, or leg, especially on only one side of your body. ? Sudden vision changes. ? Sudden trouble speaking. ? Sudden confusion or trouble understanding simple statements. ? Sudden problems with walking or balance. ? A sudden, severe headache that is different from past headaches.     · You have severe back or belly pain. Do not wait until your blood pressure comes down on its own. Get help right away. Call your doctor now or seek immediate care if:    · Your blood pressure is much higher than normal (such as 180/120 or higher), but you don't have symptoms.     · You think high blood pressure is causing symptoms, such as:  ? Severe headache.  ? Blurry vision. Watch closely for changes in your health, and be sure to contact your doctor if:    · Your blood pressure measures higher than your doctor recommends at least 2 times. That means the top number is higher or the bottom number is higher, or both.     · You think you may be having side effects from your blood pressure medicine. Where can you learn more? Go to http://www.gray.com/  Enter [id number] in the search box to learn more about \"High Blood Pressure: Care Instructions. \"  Current as of: April 29, 2021               Content Version: 13.0  © 2006-2021 [employer]. Care instructions adapted under license by [employer] (which disclaims liability or warranty for this information). If you have questions about a medical condition or this instruction, always ask your healthcare professional. [place] [address] any warranty or liability for your use of this information.

## 2022-01-12 NOTE — PROGRESS NOTES
Gaudencio Momin is a 48 y.o.  male and presents with Hypertension and Obesity      SUBJECTIVE:    Cardiovascular Review:  The patient has hypertension and obesity. Diet and Lifestyle: generally follows a low fat low cholesterol diet, exercises regularly  Home BP Monitoring: is poorly controlled at home with -150/90's. Pt says he is compliant with taking his BP meds. Emphasized the patient that the key to improving his blood pressure is losing about 20 pounds. Pertinent ROS: taking medications as instructed, no medication side effects noted, no TIA's, no chest pain on exertion, no dyspnea on exertion, no swelling of ankles. Pt continues to struggle with losing weight. He is using Adipex to try and suppress his appetite. Contrave did not work for him. Pt advised to cut back starches and sugar and increase protein in his diet to try and lose 25 -50 lbs. Patient was diagnosed with sleep apnea and should be using using a CPAP. Patient status post recent right hip replacement. Respiratory ROS: negative for - shortness of breath  Cardiovascular ROS: negative for - chest pain    Current Outpatient Medications   Medication Sig    spironolactone (ALDACTONE) 25 mg tablet Take 1 Tablet by mouth daily.  triamcinolone acetonide (KENALOG) 0.1 % topical cream use thin layer    phentermine (ADIPEX-P) 37.5 mg tablet Take 1 Tablet by mouth every morning. Max Daily Amount: 37.5 mg.    meloxicam (MOBIC) 15 mg tablet Take 1 Tablet by mouth daily.  amLODIPine-valsartan (EXFORGE)  mg per tablet TAKE 1 TABLET BY MOUTH EVERY DAY    ketoconazole (NIZORAL) 2 % shampoo Apply quarter size daily as needed    acetaminophen (TYLENOL) 500 mg tablet Take 1,000 mg by mouth every six (6) hours as needed for Pain.  multivitamin (ONE A DAY) tablet Take 1 Tab by mouth daily.  triamcinolone acetonide (KENALOG) 0.1 % topical cream Apply  to affected area two (2) times a day.  Apply dime size to affected area daily for 1 week as needed     No current facility-administered medications for this visit. OBJECTIVE:  alert, well appearing, and in no distress  Visit Vitals  BP (!) 143/91 (BP 1 Location: Left arm, BP Patient Position: Sitting, BP Cuff Size: Adult)   Pulse 73   Temp 97.8 °F (36.6 °C) (Temporal)   Resp 20   Ht 6' 4\" (1.93 m)   Wt 290 lb (131.5 kg)   SpO2 97%   BMI 35.30 kg/m²      well developed and well nourished  Chest - clear to auscultation, no wheezes, rales or rhonchi, symmetric air entry  Heart - normal rate, regular rhythm, normal S1, S2, no murmurs, rubs, clicks or gallops  Extremities - no edema     Labs:   Lab Results   Component Value Date/Time    Cholesterol, total 180 09/01/2021 08:55 AM    HDL Cholesterol 45 09/01/2021 08:55 AM    LDL, calculated 105 (H) 09/01/2021 08:55 AM    Triglyceride 152 (H) 09/01/2021 08:55 AM     Lab Results   Component Value Date/Time    Prostate Specific Ag 0.530 09/01/2021 08:55 AM    Prostate Specific Ag 0.4 03/09/2020 04:03 AM    Prostate Specific Ag 0.6 03/29/2019 09:40 AM     Lab Results   Component Value Date/Time    Sodium 139 01/06/2022 09:17 AM    Potassium 4.5 01/06/2022 09:17 AM    Chloride 105 01/06/2022 09:17 AM    CO2 24 01/06/2022 09:17 AM    Anion gap 10.0 01/06/2022 09:17 AM    Glucose 90 01/06/2022 09:17 AM    BUN 15 01/06/2022 09:17 AM    Creatinine 0.9 01/06/2022 09:17 AM    BUN/Creatinine ratio 13 03/09/2020 04:03 AM    GFR est  03/09/2020 04:03 AM    GFR est non-AA 89 03/09/2020 04:03 AM    Calcium 9.5 01/06/2022 09:17 AM    Bilirubin, total 0.5 01/06/2022 09:17 AM    ALT (SGPT) 30 01/06/2022 09:17 AM    Alk. phosphatase 55 01/06/2022 09:17 AM    Protein, total 7.2 01/06/2022 09:17 AM    Albumin 4.5 01/06/2022 09:17 AM    Globulin 2.7 01/06/2022 09:17 AM    A-G Ratio 1.7 01/06/2022 09:17 AM          Discussed the patient's BMI with him. The BMI follow up plan is as follows: BMI is out of normal parameters and plan is as follows:  I have counseled this patient on diet and exercise regimens. Assessment/Plan       ICD-10-CM ICD-9-CM    1. Essential hypertension  I10 401.9  uncontrolled on Exforge and spironolactone (ALDACTONE) 25 mg tablet. Patient will try and improve further by losing 10 to 20 pounds   2. Class 2 severe obesity due to excess calories with serious comorbidity and body mass index (BMI) of 35.0 to 35.9 in adult [facility])  E66.01 278.01  will continue phentermine (ADIPEX-P) 37.5 mg tablet along with diet and exercise for now    Z68.35 V85.35    3. Seborrheic dermatitis  L21.9 690.10 triamcinolone acetonide (KENALOG) 0.1 % topical cream         Follow-up and Dispositions    · Return in about 4 months (around 5/11/2022) for labs 1 week before. Reviewed plan of care. Patient has provided input and agrees with goals.

## 2022-02-25 DIAGNOSIS — E66.01 CLASS 2 SEVERE OBESITY DUE TO EXCESS CALORIES WITH SERIOUS COMORBIDITY AND BODY MASS INDEX (BMI) OF 35.0 TO 35.9 IN ADULT (HCC): ICD-10-CM

## 2022-02-25 RX ORDER — PHENTERMINE HYDROCHLORIDE 37.5 MG/1
37.5 TABLET ORAL
Qty: 30 TABLET | Refills: 0 | Status: SHIPPED | OUTPATIENT
Start: 2022-02-25 | End: 2022-04-25

## 2022-02-25 RX ORDER — AMLODIPINE AND VALSARTAN 10; 320 MG/1; MG/1
TABLET ORAL
Qty: 90 TABLET | Refills: 2 | Status: CANCELLED | OUTPATIENT
Start: 2022-02-25

## 2022-02-25 NOTE — TELEPHONE ENCOUNTER
Exforge was sent on 11/17/21 #90 with 2 refills    [facility] reports the last fill date for Adipex as 1/11/22 for a 30 d/s. Last Visit: 1/11/22 with MD Zarate  Next Appointment: 5/11/22 with MD Zarate  Previous Refill Encounter(s): 1/11/22 #30    Requested Prescriptions     Pending Prescriptions Disp Refills    phentermine (ADIPEX-P) 37.5 mg tablet 30 Tablet 0     Sig: Take 1 Tablet by mouth every morning.  Max Daily Amount: 37.5 mg.

## 2022-02-28 LAB — COLONOSCOPY, EXTERNAL: NORMAL

## 2022-03-19 PROBLEM — M16.11 OSTEOARTHRITIS OF RIGHT HIP: Status: ACTIVE | Noted: 2021-06-20

## 2022-03-20 PROBLEM — E66.01 SEVERE OBESITY WITH BODY MASS INDEX (BMI) OF 35.0 TO 39.9 WITH SERIOUS COMORBIDITY (HCC): Status: ACTIVE | Noted: 2018-06-06

## 2022-04-25 DIAGNOSIS — E66.01 CLASS 2 SEVERE OBESITY DUE TO EXCESS CALORIES WITH SERIOUS COMORBIDITY AND BODY MASS INDEX (BMI) OF 35.0 TO 35.9 IN ADULT (HCC): ICD-10-CM

## 2022-04-25 RX ORDER — PHENTERMINE HYDROCHLORIDE 37.5 MG/1
37.5 TABLET ORAL
Qty: 30 TABLET | Refills: 0 | Status: SHIPPED | OUTPATIENT
Start: 2022-04-25 | End: 2022-05-11 | Stop reason: SDUPTHER

## 2022-05-06 ENCOUNTER — APPOINTMENT (OUTPATIENT)
Dept: INTERNAL MEDICINE CLINIC | Age: 47
End: 2022-05-06

## 2022-05-06 DIAGNOSIS — E78.9 BORDERLINE HIGH CHOLESTEROL: ICD-10-CM

## 2022-05-06 DIAGNOSIS — I10 ESSENTIAL HYPERTENSION: Primary | ICD-10-CM

## 2022-05-06 DIAGNOSIS — I10 ESSENTIAL HYPERTENSION: ICD-10-CM

## 2022-05-07 LAB
A-G RATIO,AGRAT: 1.8 RATIO (ref 1.1–2.6)
ALBUMIN SERPL-MCNC: 4.6 G/DL (ref 3.5–5)
ALP SERPL-CCNC: 54 U/L (ref 25–115)
ALT SERPL-CCNC: 30 U/L (ref 5–40)
ANION GAP SERPL CALC-SCNC: 10 MMOL/L (ref 3–15)
AST SERPL W P-5'-P-CCNC: 19 U/L (ref 10–37)
BILIRUB SERPL-MCNC: 0.4 MG/DL (ref 0.2–1.2)
BUN SERPL-MCNC: 15 MG/DL (ref 6–22)
CALCIUM SERPL-MCNC: 9.9 MG/DL (ref 8.4–10.5)
CHLORIDE SERPL-SCNC: 105 MMOL/L (ref 98–110)
CHOLEST SERPL-MCNC: 191 MG/DL (ref 110–200)
CO2 SERPL-SCNC: 24 MMOL/L (ref 20–32)
CREAT SERPL-MCNC: 0.9 MG/DL (ref 0.5–1.2)
GFRAA, 66117: >60
GFRNA, 66118: >60
GLOBULIN,GLOB: 2.5 G/DL (ref 2–4)
GLUCOSE SERPL-MCNC: 87 MG/DL (ref 70–99)
HDLC SERPL-MCNC: 4 MG/DL (ref 0–5)
HDLC SERPL-MCNC: 48 MG/DL
LDL/HDL RATIO,LDHD: 2.6
LDLC SERPL CALC-MCNC: 124 MG/DL (ref 50–99)
NON-HDL CHOLESTEROL, 011976: 143 MG/DL
POTASSIUM SERPL-SCNC: 4.6 MMOL/L (ref 3.5–5.5)
PROT SERPL-MCNC: 7.1 G/DL (ref 6.4–8.3)
SODIUM SERPL-SCNC: 139 MMOL/L (ref 133–145)
TRIGL SERPL-MCNC: 96 MG/DL (ref 40–149)
VLDLC SERPL CALC-MCNC: 19 MG/DL (ref 8–30)

## 2022-05-11 ENCOUNTER — OFFICE VISIT (OUTPATIENT)
Dept: INTERNAL MEDICINE CLINIC | Age: 47
End: 2022-05-11
Payer: COMMERCIAL

## 2022-05-11 VITALS
HEART RATE: 72 BPM | BODY MASS INDEX: 35.19 KG/M2 | WEIGHT: 289 LBS | HEIGHT: 76 IN | DIASTOLIC BLOOD PRESSURE: 85 MMHG | OXYGEN SATURATION: 99 % | RESPIRATION RATE: 20 BRPM | SYSTOLIC BLOOD PRESSURE: 145 MMHG | TEMPERATURE: 98.3 F

## 2022-05-11 DIAGNOSIS — R73.03 PREDIABETES: ICD-10-CM

## 2022-05-11 DIAGNOSIS — E66.01 CLASS 2 SEVERE OBESITY DUE TO EXCESS CALORIES WITH SERIOUS COMORBIDITY AND BODY MASS INDEX (BMI) OF 35.0 TO 35.9 IN ADULT (HCC): ICD-10-CM

## 2022-05-11 DIAGNOSIS — I10 ESSENTIAL HYPERTENSION: Primary | ICD-10-CM

## 2022-05-11 LAB — HBA1C MFR BLD HPLC: 5.4 %

## 2022-05-11 PROCEDURE — 99214 OFFICE O/P EST MOD 30 MIN: CPT | Performed by: INTERNAL MEDICINE

## 2022-05-11 PROCEDURE — 83036 HEMOGLOBIN GLYCOSYLATED A1C: CPT | Performed by: INTERNAL MEDICINE

## 2022-05-11 RX ORDER — PHENTERMINE HYDROCHLORIDE 37.5 MG/1
37.5 TABLET ORAL
Qty: 30 TABLET | Refills: 0 | Status: SHIPPED | OUTPATIENT
Start: 2022-05-11 | End: 2022-07-29 | Stop reason: SDUPTHER

## 2022-05-11 NOTE — PROGRESS NOTES
Patient is in the office today for a 4 month follow up. Patient states he would like a refill on Phentermine. 1. \"Have you been to the ER, urgent care clinic since your last visit? Hospitalized since your last visit? \" No    2. \"Have you seen or consulted any other health care providers outside of the [address] [place] [address] since your last visit? \" No     3. For patients aged 39-70: Has the patient had a colonoscopy / FIT/ Cologuard? Yes - no Care Gap present      If the patient is female:    4. For patients aged 41-77: Has the patient had a mammogram within the past 2 years? NA - based on age or sex      11. For patients aged 21-65: Has the patient had a pap smear?  NA - based on age or sex    No

## 2022-05-17 NOTE — PROGRESS NOTES
Brianda Morales is a 48 y.o.  male and presents with Hypertension, Blood sugar problem, and Obesity      SUBJECTIVE:    Cardiovascular Review:  The patient has hypertension and obesity. Diet and Lifestyle: generally follows a low fat low cholesterol diet, exercises regularly  Home BP Monitoring: is poorly controlled at home with -150/90's. Pt says he is compliant with taking his BP meds. Emphasized the patient that the key to improving his blood pressure is losing about 20 pounds. Pertinent ROS: taking medications as instructed, no medication side effects noted, no TIA's, no chest pain on exertion, no dyspnea on exertion, no swelling of ankles. Pt continues to struggle with losing weight. He is using Adipex to try and suppress his appetite. Contrave did not work for him. Pt advised to cut back starches and sugar and increase protein in his diet to try and lose 20 -30 lbs. Patient was diagnosed with sleep apnea and should be using using a CPAP. Patient's prediabetes has improved with some weight loss. Respiratory ROS: negative for - shortness of breath  Cardiovascular ROS: negative for - chest pain    Current Outpatient Medications   Medication Sig    phentermine (ADIPEX-P) 37.5 mg tablet Take 1 Tablet by mouth every morning. Max Daily Amount: 37.5 mg.    spironolactone (ALDACTONE) 25 mg tablet Take 1 Tablet by mouth daily.  triamcinolone acetonide (KENALOG) 0.1 % topical cream use thin layer    meloxicam (MOBIC) 15 mg tablet Take 1 Tablet by mouth daily.  amLODIPine-valsartan (EXFORGE)  mg per tablet TAKE 1 TABLET BY MOUTH EVERY DAY    ketoconazole (NIZORAL) 2 % shampoo Apply quarter size daily as needed    acetaminophen (TYLENOL) 500 mg tablet Take 1,000 mg by mouth every six (6) hours as needed for Pain.  multivitamin (ONE A DAY) tablet Take 1 Tab by mouth daily.  triamcinolone acetonide (KENALOG) 0.1 % topical cream Apply  to affected area two (2) times a day. Apply dime size to affected area daily for 1 week as needed     No current facility-administered medications for this visit. OBJECTIVE:  alert, well appearing, and in no distress  Visit Vitals  BP (!) 145/85 (BP 1 Location: Right arm, BP Patient Position: Sitting, BP Cuff Size: Adult)   Pulse 72   Temp 98.3 °F (36.8 °C) (Temporal)   Resp 20   Ht 6' 4\" (1.93 m)   Wt 289 lb (131.1 kg)   SpO2 99%   BMI 35.18 kg/m²      well developed and well nourished  Chest - clear to auscultation, no wheezes, rales or rhonchi, symmetric air entry  Heart - normal rate, regular rhythm, normal S1, S2, no murmurs, rubs, clicks or gallops  Extremities - no edema     Labs:   Lab Results   Component Value Date/Time    Cholesterol, total 191 05/06/2022 10:38 AM    HDL Cholesterol 48 05/06/2022 10:38 AM    LDL, calculated 124 (H) 05/06/2022 10:38 AM    Triglyceride 96 05/06/2022 10:38 AM     Lab Results   Component Value Date/Time    Prostate Specific Ag 0.530 09/01/2021 08:55 AM    Prostate Specific Ag 0.4 03/09/2020 04:03 AM    Prostate Specific Ag 0.6 03/29/2019 09:40 AM     Lab Results   Component Value Date/Time    Sodium 139 05/06/2022 10:38 AM    Potassium 4.6 05/06/2022 10:38 AM    Chloride 105 05/06/2022 10:38 AM    CO2 24 05/06/2022 10:38 AM    Anion gap 10.0 05/06/2022 10:38 AM    Glucose 87 05/06/2022 10:38 AM    BUN 15 05/06/2022 10:38 AM    Creatinine 0.9 05/06/2022 10:38 AM    BUN/Creatinine ratio 13 03/09/2020 04:03 AM    GFR est  03/09/2020 04:03 AM    GFR est non-AA 89 03/09/2020 04:03 AM    Calcium 9.9 05/06/2022 10:38 AM    Bilirubin, total 0.4 05/06/2022 10:38 AM    ALT (SGPT) 30 05/06/2022 10:38 AM    Alk.  phosphatase 54 05/06/2022 10:38 AM    Protein, total 7.1 05/06/2022 10:38 AM    Albumin 4.6 05/06/2022 10:38 AM    Globulin 2.5 05/06/2022 10:38 AM    A-G Ratio 1.8 05/06/2022 10:38 AM       Labs:   Lab Results   Component Value Date/Time    Hemoglobin A1c 5.7 (H) 05/25/2021 10:06 AM    Hemoglobin A1c (POC) 5.4 05/11/2022 09:44 AM            Discussed the patient's BMI with him. The BMI follow up plan is as follows: BMI is out of normal parameters and plan is as follows: I have counseled this patient on diet and exercise regimens. Assessment/Plan      ICD-10-CM ICD-9-CM    1. Essential hypertension  I10 401.9  uncontrolled on Aldactone 25 mg daily and Exforge 10/320. If not improved by next office visit would add HCTZ METABOLIC PANEL, COMPREHENSIVE      LIPID PANEL   2. Prediabetes  R73.03 790.29  improved with weight loss and currently resolved AMB POC HEMOGLOBIN A1C      HEMOGLOBIN A1C W/O EAG   3. Class 2 severe obesity due to excess calories with serious comorbidity and body mass index (BMI) of 35.0 to 35.9 in adult [facility])  E66.01 278.01  patient to continue to work on trying to lose weight with diet and exercise and will continue phentermine (ADIPEX-P) 37.5 mg tablet for short period of time. Z68.35 V85.35          Follow-up and Dispositions    · Return in about 4 months (around 9/11/2022) for labs 1 week before. Reviewed plan of care. Patient has provided input and agrees with goals.

## 2022-07-23 RX ORDER — MELOXICAM 15 MG/1
15 TABLET ORAL DAILY
Qty: 90 TABLET | Refills: 1 | Status: SHIPPED | OUTPATIENT
Start: 2022-07-23 | End: 2022-10-04 | Stop reason: SDUPTHER

## 2022-07-29 DIAGNOSIS — E66.01 CLASS 2 SEVERE OBESITY DUE TO EXCESS CALORIES WITH SERIOUS COMORBIDITY AND BODY MASS INDEX (BMI) OF 35.0 TO 35.9 IN ADULT (HCC): ICD-10-CM

## 2022-08-01 RX ORDER — PHENTERMINE HYDROCHLORIDE 37.5 MG/1
37.5 TABLET ORAL
Qty: 30 TABLET | Refills: 0 | Status: SHIPPED | OUTPATIENT
Start: 2022-08-01 | End: 2022-08-22

## 2022-08-01 NOTE — TELEPHONE ENCOUNTER
[facility] last [facility] date for Adipex as 5/20/22 for a 30 d/s. Last Visit: 5/11/22 with MD Zarate  Next Appointment: 9/14/22 with MD Zarate  Previous Refill Encounter(s): 5/11/22 #30    Requested Prescriptions     Pending Prescriptions Disp Refills    phentermine (ADIPEX-P) 37.5 mg tablet 30 Tablet 0     Sig: Take 1 Tablet by mouth every morning. Max Daily Amount: 37.5 mg. For [facility] in place:   Recommendation Provided To:    Intervention Detail: New Rx: 1, reason: Patient Preference  Gap Closed?:   Intervention Accepted By:   Time Spent (min): 5

## 2022-08-21 DIAGNOSIS — E66.01 CLASS 2 SEVERE OBESITY DUE TO EXCESS CALORIES WITH SERIOUS COMORBIDITY AND BODY MASS INDEX (BMI) OF 35.0 TO 35.9 IN ADULT (HCC): ICD-10-CM

## 2022-08-22 RX ORDER — PHENTERMINE HYDROCHLORIDE 37.5 MG/1
37.5 TABLET ORAL
Qty: 30 TABLET | Refills: 0 | Status: SHIPPED | OUTPATIENT
Start: 2022-08-22

## 2022-08-31 RX ORDER — AMLODIPINE AND VALSARTAN 10; 320 MG/1; MG/1
TABLET ORAL
Qty: 90 TABLET | Refills: 2 | Status: SHIPPED | OUTPATIENT
Start: 2022-08-31

## 2022-09-09 ENCOUNTER — APPOINTMENT (OUTPATIENT)
Dept: INTERNAL MEDICINE CLINIC | Age: 47
End: 2022-09-09

## 2022-09-12 LAB
ALBUMIN SERPL-MCNC: 4.5 G/DL (ref 4–5)
ALBUMIN/GLOB SERPL: 1.5 {RATIO} (ref 1.2–2.2)
ALP SERPL-CCNC: 58 IU/L (ref 44–121)
ALT SERPL-CCNC: 27 IU/L (ref 0–44)
AST SERPL-CCNC: 20 IU/L (ref 0–40)
BILIRUB SERPL-MCNC: 0.4 MG/DL (ref 0–1.2)
BUN SERPL-MCNC: 13 MG/DL (ref 6–24)
BUN/CREAT SERPL: 13 (ref 9–20)
CALCIUM SERPL-MCNC: 9.7 MG/DL (ref 8.7–10.2)
CHLORIDE SERPL-SCNC: 103 MMOL/L (ref 96–106)
CHOLEST SERPL-MCNC: 183 MG/DL (ref 100–199)
CO2 SERPL-SCNC: 20 MMOL/L (ref 20–29)
CREAT SERPL-MCNC: 1 MG/DL (ref 0.76–1.27)
EGFR: 92 ML/MIN/1.73
GLOBULIN SER CALC-MCNC: 3.1 G/DL (ref 1.5–4.5)
GLUCOSE SERPL-MCNC: 102 MG/DL (ref 65–99)
HBA1C MFR BLD: 5.7 % (ref 4.8–5.6)
HDLC SERPL-MCNC: 44 MG/DL
IMP & REVIEW OF LAB RESULTS: NORMAL
LDLC SERPL CALC-MCNC: 118 MG/DL (ref 0–99)
POTASSIUM SERPL-SCNC: 4 MMOL/L (ref 3.5–5.2)
PROT SERPL-MCNC: 7.6 G/DL (ref 6–8.5)
SODIUM SERPL-SCNC: 139 MMOL/L (ref 134–144)
SPECIMEN STATUS REPORT, ROLRST: NORMAL
TRIGL SERPL-MCNC: 115 MG/DL (ref 0–149)
VLDLC SERPL CALC-MCNC: 21 MG/DL (ref 5–40)

## 2022-09-14 DIAGNOSIS — I10 ESSENTIAL HYPERTENSION: ICD-10-CM

## 2022-09-14 DIAGNOSIS — R73.03 PREDIABETES: ICD-10-CM

## 2022-10-04 ENCOUNTER — OFFICE VISIT (OUTPATIENT)
Dept: INTERNAL MEDICINE CLINIC | Age: 47
End: 2022-10-04
Payer: COMMERCIAL

## 2022-10-04 VITALS
HEART RATE: 80 BPM | DIASTOLIC BLOOD PRESSURE: 102 MMHG | TEMPERATURE: 98.7 F | HEIGHT: 76 IN | BODY MASS INDEX: 35.31 KG/M2 | SYSTOLIC BLOOD PRESSURE: 145 MMHG | OXYGEN SATURATION: 98 % | WEIGHT: 290 LBS | RESPIRATION RATE: 18 BRPM

## 2022-10-04 DIAGNOSIS — E66.01 CLASS 2 SEVERE OBESITY DUE TO EXCESS CALORIES WITH SERIOUS COMORBIDITY AND BODY MASS INDEX (BMI) OF 35.0 TO 35.9 IN ADULT (HCC): ICD-10-CM

## 2022-10-04 DIAGNOSIS — R73.03 PREDIABETES: ICD-10-CM

## 2022-10-04 DIAGNOSIS — I10 ESSENTIAL HYPERTENSION: Primary | ICD-10-CM

## 2022-10-04 DIAGNOSIS — B36.0 TINEA VERSICOLOR: ICD-10-CM

## 2022-10-04 DIAGNOSIS — Z23 ENCOUNTER FOR IMMUNIZATION: ICD-10-CM

## 2022-10-04 PROCEDURE — 90471 IMMUNIZATION ADMIN: CPT | Performed by: INTERNAL MEDICINE

## 2022-10-04 PROCEDURE — 90686 IIV4 VACC NO PRSV 0.5 ML IM: CPT | Performed by: INTERNAL MEDICINE

## 2022-10-04 PROCEDURE — 99214 OFFICE O/P EST MOD 30 MIN: CPT | Performed by: INTERNAL MEDICINE

## 2022-10-04 RX ORDER — MELOXICAM 15 MG/1
15 TABLET ORAL DAILY
Qty: 90 TABLET | Refills: 1 | Status: SHIPPED | OUTPATIENT
Start: 2022-10-04

## 2022-10-04 RX ORDER — SPIRONOLACTONE 25 MG/1
25 TABLET ORAL DAILY
Qty: 90 TABLET | Refills: 2 | Status: SHIPPED | OUTPATIENT
Start: 2022-10-04

## 2022-10-04 RX ORDER — KETOCONAZOLE 20 MG/ML
SHAMPOO TOPICAL
Qty: 120 ML | Refills: 2 | Status: SHIPPED | OUTPATIENT
Start: 2022-10-04

## 2022-10-04 RX ORDER — PHENTERMINE HYDROCHLORIDE 37.5 MG/1
37.5 TABLET ORAL
Qty: 30 TABLET | Refills: 0 | Status: CANCELLED | OUTPATIENT
Start: 2022-10-04

## 2022-10-04 NOTE — PATIENT INSTRUCTIONS
Vaccine Information Statement    Influenza (Flu) Vaccine (Inactivated or Recombinant): What You Need to Know    Many vaccine information statements are available in Thai and other languages. See www.immunize.org/vis. Hojas de información sobre vacunas están disponibles en español y en muchos otros idiomas. Visite www.immunize.org/vis. 1. Why get vaccinated? Influenza vaccine can prevent influenza (flu). Flu is a contagious disease that spreads around the [facility] every year, usually between October and May. Anyone can get the flu, but it is more dangerous for some people. Infants and young children, people 72 years and older, pregnant people, and people with certain health conditions or a weakened immune system are at greatest risk of flu complications. Pneumonia, bronchitis, sinus infections, and ear infections are examples of flu-related complications. If you have a medical condition, such as heart disease, cancer, or diabetes, flu can make it worse. Flu can cause fever and chills, sore throat, muscle aches, fatigue, cough, headache, and runny or stuffy nose. Some people may have vomiting and diarrhea, though this is more common in children than adults. In an average year, thousands of people in the [facility] die from flu, and many more are hospitalized. Flu vaccine prevents millions of illnesses and flu-related visits to the doctor each year. 2. Influenza vaccines     CDC recommends everyone 6 months and older get vaccinated every flu season. Children 6 months through 6years of age may need 2 doses during a single flu season. Everyone else needs only 1 dose each flu season. It takes about 2 weeks for protection to develop after vaccination. There are many flu viruses, and they are always changing. Each year a new flu vaccine is made to protect against the influenza viruses believed to be likely to cause disease in the upcoming flu season.  Even when the vaccine doesnt exactly match these viruses, it may still provide some protection. Influenza vaccine does not cause flu. Influenza vaccine may be given at the same time as other vaccines. 3. Talk with your health care provider    Tell your vaccination provider if the person getting the vaccine:  Has had an allergic reaction after a previous dose of influenza vaccine, or has any severe, life-threatening allergies   Has ever had Guillain-Barré Syndrome (also called GBS)    In some cases, your health care provider may decide to postpone influenza vaccination until a future visit. Influenza vaccine can be administered at any time during pregnancy. People who are or will be pregnant during influenza season should receive inactivated influenza vaccine. People with minor illnesses, such as a cold, may be vaccinated. People who are moderately or severely ill should usually wait until they recover before getting influenza vaccine. Your health care provider can give you more information. 4. Risks of a vaccine reaction    Soreness, redness, and swelling where the shot is given, fever, muscle aches, and headache can happen after influenza vaccination. There may be a very small increased risk of Guillain-Barré Syndrome (GBS) after inactivated influenza vaccine (the flu shot). Remus Barer children who get the flu shot along with pneumococcal vaccine (PCV13) and/or DTaP vaccine at the same time might be slightly more likely to have a seizure caused by fever. Tell your health care provider if a child who is getting flu vaccine has ever had a seizure. People sometimes faint after medical procedures, including vaccination. Tell your provider if you feel dizzy or have vision changes or ringing in the ears. As with any medicine, there is a very remote chance of a vaccine causing a severe allergic reaction, other serious injury, or death. 5. What if there is a serious problem?     An allergic reaction could occur after the vaccinated person leaves the clinic. If you see signs of a severe allergic reaction (hives, swelling of the face and throat, difficulty breathing, a fast heartbeat, dizziness, or weakness), call 9-1-1 and get the person to the nearest hospital.    For other signs that concern you, call your health care provider. Adverse reactions should be reported to the Vaccine Adverse Event Reporting System (VAERS). Your health care provider will usually file this report, or you can do it yourself. Visit the VAERS website at www.vaers. [facility].gov or call 2-241.473.8743. VAERS is only for reporting reactions, and VAERS staff members do not give medical advice. 6. The National Vaccine Injury Compensation Program    The [facility] Vaccine Injury Compensation Program (VICP) is a federal program that was created to compensate people who may have been injured by certain vaccines. Claims regarding alleged injury or death due to vaccination have a time limit for filing, which may be as short as two years. Visit the VICP website at www.[facility].gov/vaccinecompensation or call 1-223.992.3357 to learn about the program and about filing a claim. 7. How can I learn more? Ask your health care provider. Call your local or state health department. Visit the website of the Food and Drug Administration (FDA) for vaccine package inserts and additional information at www.fda.gov/vaccines-blood-biologics/vaccines. Contact the Centers for Disease Control and Prevention (CDC): Call 2-993.583.5941 (7-021-DWH-INFO) or  Visit CDCs influenza website at www.cdc.gov/flu. Vaccine Information Statement   Inactivated Influenza Vaccine   8/6/2021  42 BLAINE Suggs [id number]   Department of Health and Human Services  Centers for Disease Control and Prevention    Office Use Only

## 2022-10-04 NOTE — PROGRESS NOTES
Patient is in the office today for a 4 month follow up. 1. \"Have you been to the ER, urgent care clinic since your last visit? Hospitalized since your last visit? \" No    2. \"Have you seen or consulted any other health care providers outside of the [address] [place] [address] since your last visit? \" No     3. For patients aged 39-70: Has the patient had a colonoscopy / FIT/ Cologuard? Yes - no Care Gap present      If the patient is female:    4. For patients aged 41-77: Has the patient had a mammogram within the past 2 years? NA - based on age or sex      11. For patients aged 21-65: Has the patient had a pap smear? NA - based on age or sex      Verbal order read back per Dr. Yesi Hess flu vaccine. Patient received flu vaccine in left deltoid. Patient was observed and no signs or symptoms of an allergic reaction noted at this time. Patient tolerated well and left without complaints. Patient received flu VIS.

## 2022-10-04 NOTE — PROGRESS NOTES
Yessi Vicente is a 50 y.o.  male and presents with Hypertension, Blood sugar problem (F/u), Immunization/Injection (Flu vaccine/), Sleep Apnea (On CPAP), and Obesity      SUBJECTIVE:    Cardiovascular Review:  The patient has hypertension and obesity. Diet and Lifestyle: generally follows a low fat low cholesterol diet, exercises regularly  Home BP Monitoring: is poorly controlled at home with -150/90's. Pt says he is compliant with taking his BP meds. Emphasized the patient that the key to improving his blood pressure is losing about 20 pounds. Pertinent ROS: taking medications as instructed, no medication side effects noted, no TIA's, no chest pain on exertion, no dyspnea on exertion, no swelling of ankles. Pt continues to struggle with losing weight. He has not had success with weight loss meds. Pt advised to cut back starches and sugar and increase protein in his diet to try and lose 20 -30 lbs. Patient has sleep apnea and is doing well on CPAP. Patient has prediabetes. Will try GLP-1 to get under better control and help with weight loss if insurance company approves. Respiratory ROS: negative for - shortness of breath  Cardiovascular ROS: negative for - chest pain    Current Outpatient Medications   Medication Sig    spironolactone (ALDACTONE) 25 mg tablet Take 1 Tablet by mouth daily. ketoconazole (NIZORAL) 2 % shampoo Apply quarter size daily as needed    meloxicam (MOBIC) 15 mg tablet Take 1 Tablet by mouth daily. dulaglutide (TRULICITY) 1.16 EQ/0.5 mL sub-q pen 0.5 mL by SubCUTAneous route every seven (7) days. amLODIPine-valsartan (EXFORGE)  mg per tablet TAKE 1 TABLET BY MOUTH EVERY DAY    phentermine (ADIPEX-P) 37.5 mg tablet TAKE 1 TABLET BY MOUTH EVERY MORNING.  MAX DAILY AMOUNT: 37.5 MG    triamcinolone acetonide (KENALOG) 0.1 % topical cream use thin layer    acetaminophen (TYLENOL) 500 mg tablet Take 1,000 mg by mouth every six (6) hours as needed for Pain.    multivitamin (ONE A DAY) tablet Take 1 Tab by mouth daily. triamcinolone acetonide (KENALOG) 0.1 % topical cream Apply  to affected area two (2) times a day. Apply dime size to affected area daily for 1 week as needed     No current facility-administered medications for this visit. OBJECTIVE:  alert, well appearing, and in no distress  Visit Vitals  BP (!) 145/102 (BP 1 Location: Right arm, BP Patient Position: Sitting, BP Cuff Size: Adult)   Pulse 80   Temp 98.7 °F (37.1 °C) (Temporal)   Resp 18   Ht 6' 4\" (1.93 m)   Wt 290 lb (131.5 kg)   SpO2 98%   BMI 35.30 kg/m²      well developed and well nourished  Chest - clear to auscultation, no wheezes, rales or rhonchi, symmetric air entry  Heart - normal rate, regular rhythm, normal S1, S2, no murmurs, rubs, clicks or gallops  Extremities - no edema     Labs:   Lab Results   Component Value Date/Time    Cholesterol, total 183 09/10/2022 12:00 AM    HDL Cholesterol 44 09/10/2022 12:00 AM    LDL, calculated 118 (H) 09/10/2022 12:00 AM    LDL, calculated 124 (H) 05/06/2022 10:38 AM    Triglyceride 115 09/10/2022 12:00 AM     Lab Results   Component Value Date/Time    Prostate Specific Ag 0.530 09/01/2021 08:55 AM    Prostate Specific Ag 0.4 03/09/2020 04:03 AM    Prostate Specific Ag 0.6 03/29/2019 09:40 AM     Lab Results   Component Value Date/Time    Sodium 139 09/10/2022 12:00 AM    Potassium 4.0 09/10/2022 12:00 AM    Chloride 103 09/10/2022 12:00 AM    CO2 20 09/10/2022 12:00 AM    Anion gap 10.0 05/06/2022 10:38 AM    Glucose 102 (H) 09/10/2022 12:00 AM    BUN 13 09/10/2022 12:00 AM    Creatinine 1.00 09/10/2022 12:00 AM    BUN/Creatinine ratio 13 09/10/2022 12:00 AM    GFR est  03/09/2020 04:03 AM    GFR est non-AA 89 03/09/2020 04:03 AM    Calcium 9.7 09/10/2022 12:00 AM    Bilirubin, total 0.4 09/10/2022 12:00 AM    ALT (SGPT) 27 09/10/2022 12:00 AM    Alk.  phosphatase 58 09/10/2022 12:00 AM    Protein, total 7.6 09/10/2022 12:00 AM Albumin 4.5 09/10/2022 12:00 AM    Globulin 2.5 05/06/2022 10:38 AM    A-G Ratio 1.5 09/10/2022 12:00 AM       Labs:   Lab Results   Component Value Date/Time    Hemoglobin A1c 5.7 (H) 09/10/2022 12:00 AM    Hemoglobin A1c (POC) 5.4 05/11/2022 09:44 AM            Discussed the patient's BMI with him. The BMI follow up plan is as follows: BMI is out of normal parameters and plan is as follows: I have counseled this patient on diet and exercise regimens. Assessment/Plan    ICD-10-CM ICD-9-CM    1. Essential hypertension  I10 401.9 Uncontrolled on spironolactone (ALDACTONE) 25 mg tablet and Exforge 10/320. Will try to improve with weight loss or will need to add more medications       METABOLIC PANEL, COMPREHENSIVE      LIPID PANEL      2. Prediabetes  R73.03 790.29 Uncontrolled. Will try to get pt on dulaglutide (TRULICITY) 2.66 PQ/3.5 mL sub-q pen which will also help with weight loss       HEMOGLOBIN A1C W/O EAG      3. Class 2 severe obesity due to excess calories with serious comorbidity and body mass index (BMI) of 35.0 to 35.9 in adult ([facility]  E66.01 278.01 Pt will try to cut back starches and sugar in his diet to lose weight and continue to exercise. Z68.35 V85.35       4. Tinea versicolor  B36.0 111.0 ketoconazole (NIZORAL) 2 % shampoo      5. Encounter for immunization  Z23 V03.89 ADMIN INFLUENZA VIRUS VAC      INFLUENZA, FLUARIX, FLULAVAL, FLUZONE (AGE 6 MO+), AFLURIA(AGE 3Y+) IM, PF, 0.5 ML            Follow-up and Dispositions    Return in about 3 months (around 1/4/2023) for labs 1 week before. Reviewed plan of care. Patient has provided input and agrees with goals.

## 2022-12-29 ENCOUNTER — APPOINTMENT (OUTPATIENT)
Dept: INTERNAL MEDICINE CLINIC | Age: 47
End: 2022-12-29

## 2023-01-04 DIAGNOSIS — I10 ESSENTIAL HYPERTENSION: ICD-10-CM

## 2023-01-04 DIAGNOSIS — R73.03 PREDIABETES: ICD-10-CM

## 2023-01-09 ENCOUNTER — OFFICE VISIT (OUTPATIENT)
Dept: INTERNAL MEDICINE CLINIC | Age: 48
End: 2023-01-09
Payer: COMMERCIAL

## 2023-01-09 VITALS
HEART RATE: 77 BPM | OXYGEN SATURATION: 98 % | TEMPERATURE: 97.7 F | HEIGHT: 76 IN | WEIGHT: 289 LBS | DIASTOLIC BLOOD PRESSURE: 78 MMHG | RESPIRATION RATE: 20 BRPM | BODY MASS INDEX: 35.19 KG/M2 | SYSTOLIC BLOOD PRESSURE: 138 MMHG

## 2023-01-09 DIAGNOSIS — R73.03 PREDIABETES: ICD-10-CM

## 2023-01-09 DIAGNOSIS — E66.01 CLASS 2 SEVERE OBESITY DUE TO EXCESS CALORIES WITH SERIOUS COMORBIDITY AND BODY MASS INDEX (BMI) OF 35.0 TO 35.9 IN ADULT (HCC): ICD-10-CM

## 2023-01-09 DIAGNOSIS — B36.0 TINEA VERSICOLOR: ICD-10-CM

## 2023-01-09 DIAGNOSIS — L21.9 SEBORRHEIC DERMATITIS: ICD-10-CM

## 2023-01-09 DIAGNOSIS — I10 ESSENTIAL HYPERTENSION: Primary | ICD-10-CM

## 2023-01-09 PROCEDURE — 99214 OFFICE O/P EST MOD 30 MIN: CPT | Performed by: INTERNAL MEDICINE

## 2023-01-09 PROCEDURE — 3074F SYST BP LT 130 MM HG: CPT | Performed by: INTERNAL MEDICINE

## 2023-01-09 PROCEDURE — 3078F DIAST BP <80 MM HG: CPT | Performed by: INTERNAL MEDICINE

## 2023-01-09 RX ORDER — TRIAMCINOLONE ACETONIDE 1 MG/G
CREAM TOPICAL 2 TIMES DAILY
Qty: 30 G | Refills: 2 | Status: SHIPPED | OUTPATIENT
Start: 2023-01-09

## 2023-01-09 RX ORDER — KETOCONAZOLE 20 MG/ML
SHAMPOO, SUSPENSION TOPICAL
Qty: 120 ML | Refills: 2 | Status: SHIPPED | OUTPATIENT
Start: 2023-01-09

## 2023-01-09 NOTE — PROGRESS NOTES
Patient is in the office today for a 3 month follow up. Do you have an Advance Directive no  Do you want more information : information given     1. \"Have you been to the ER, urgent care clinic since your last visit? Hospitalized since your last visit? \" No    2. \"Have you seen or consulted any other health care providers outside of the [address] [place] [address] since your last visit? \" No     3. For patients aged 39-70: Has the patient had a colonoscopy / FIT/ Cologuard? Yes - no Care Gap present      If the patient is female:    4. For patients aged 41-77: Has the patient had a mammogram within the past 2 years? NA - based on age or sex      11. For patients aged 21-65: Has the patient had a pap smear?  NA - based on age or sex

## 2023-01-11 NOTE — PROGRESS NOTES
Nayely Charlton is a 50 y.o.  male and presents with Hypertension, Blood sugar problem (Follow up), and Obesity      SUBJECTIVE:    Cardiovascular Review:  The patient has hypertension and obesity. Diet and Lifestyle: generally follows a low fat low cholesterol diet, exercises regularly  Home BP Monitoring: is better controlled at home. Pt says he is compliant with taking his BP meds  Exforge and Aldactone. .  Emphasized the patient that the key to improving his blood pressure is losing about 20 pounds. Pertinent ROS: taking medications as instructed, no medication side effects noted, no TIA's, no chest pain on exertion, no dyspnea on exertion, no swelling of ankles. Patient has prediabetes that is being treated with Trulicity 4.47 mg weekly. Patient tolerating medication well and has actually lost some weight. Patient has sleep apnea and is doing well on CPAP. Respiratory ROS: negative for - shortness of breath  Cardiovascular ROS: negative for - chest pain    Current Outpatient Medications   Medication Sig    ketoconazole (NIZORAL) 2 % shampoo Apply quarter size daily as needed    triamcinolone acetonide (KENALOG) 0.1 % topical cream Apply  to affected area two (2) times a day. Apply dime size to affected area daily for 1 week as needed    spironolactone (ALDACTONE) 25 mg tablet Take 1 Tablet by mouth daily. meloxicam (MOBIC) 15 mg tablet Take 1 Tablet by mouth daily. dulaglutide (TRULICITY) 0.23 AO/3.1 mL sub-q pen 0.5 mL by SubCUTAneous route every seven (7) days. amLODIPine-valsartan (EXFORGE)  mg per tablet TAKE 1 TABLET BY MOUTH EVERY DAY    triamcinolone acetonide (KENALOG) 0.1 % topical cream use thin layer    acetaminophen (TYLENOL) 500 mg tablet Take 1,000 mg by mouth every six (6) hours as needed for Pain.    multivitamin (ONE A DAY) tablet Take 1 Tab by mouth daily. No current facility-administered medications for this visit.          OBJECTIVE:  alert, well appearing, and in no distress  Visit Vitals  /78 (BP 1 Location: Left arm, BP Patient Position: Sitting, BP Cuff Size: Adult)   Pulse 77   Temp 97.7 °F (36.5 °C) (Temporal)   Resp 20   Ht 6' 4\" (1.93 m)   Wt 289 lb (131.1 kg)   SpO2 98%   BMI 35.18 kg/m²      well developed and well nourished  Chest - clear to auscultation, no wheezes, rales or rhonchi, symmetric air entry  Heart - normal rate, regular rhythm, normal S1, S2, no murmurs, rubs, clicks or gallops  Extremities - no edema     Labs:   Lab Results   Component Value Date/Time    Cholesterol, total 183 09/10/2022 12:00 AM    HDL Cholesterol 44 09/10/2022 12:00 AM    LDL, calculated 118 (H) 09/10/2022 12:00 AM    LDL, calculated 124 (H) 05/06/2022 10:38 AM    Triglyceride 115 09/10/2022 12:00 AM     Lab Results   Component Value Date/Time    Prostate Specific Ag 0.530 09/01/2021 08:55 AM    Prostate Specific Ag 0.4 03/09/2020 04:03 AM    Prostate Specific Ag 0.6 03/29/2019 09:40 AM     Lab Results   Component Value Date/Time    Sodium 139 09/10/2022 12:00 AM    Potassium 4.0 09/10/2022 12:00 AM    Chloride 103 09/10/2022 12:00 AM    CO2 20 09/10/2022 12:00 AM    Anion gap 10.0 05/06/2022 10:38 AM    Glucose 102 (H) 09/10/2022 12:00 AM    BUN 13 09/10/2022 12:00 AM    Creatinine 1.00 09/10/2022 12:00 AM    BUN/Creatinine ratio 13 09/10/2022 12:00 AM    GFR est  03/09/2020 04:03 AM    GFR est non-AA 89 03/09/2020 04:03 AM    Calcium 9.7 09/10/2022 12:00 AM    Bilirubin, total 0.4 09/10/2022 12:00 AM    ALT (SGPT) 27 09/10/2022 12:00 AM    Alk. phosphatase 58 09/10/2022 12:00 AM    Protein, total 7.6 09/10/2022 12:00 AM    Albumin 4.5 09/10/2022 12:00 AM    Globulin 2.5 05/06/2022 10:38 AM    A-G Ratio 1.5 09/10/2022 12:00 AM       Labs:   Lab Results   Component Value Date/Time    Hemoglobin A1c 5.7 (H) 09/10/2022 12:00 AM    Hemoglobin A1c (POC) 5.4 05/11/2022 09:44 AM            Discussed the patient's BMI with him.   The BMI follow up plan is as follows: BMI is out of normal parameters and plan is as follows: I have counseled this patient on diet and exercise regimens. Assessment/Plan      ICD-10-CM ICD-9-CM    1. Essential hypertension  I10 401.9 Controlled on Exforge 10/320 daily and Aldactone 25 mg daily      2. Prediabetes  R73.03 790.29 Patient tolerating Trulicity 3.69 mg weekly. Labs were done and awaiting results      3. Class 2 severe obesity due to excess calories with serious comorbidity and body mass index (BMI) of 35.0 to 35.9 in adult [facility])  E66.01 278.01 Patient will continue to work on trying to lose weight by cutting back starches and sugar in his diet    Z68.35 V85.35       4. Seborrheic dermatitis  L21.9 690.10 triamcinolone acetonide (KENALOG) 0.1 % topical cream      5. Tinea versicolor  B36.0 111.0 ketoconazole (NIZORAL) 2 % shampoo                Follow-up and Dispositions    Return in about 6 months (around 7/9/2023) for labs 1 week before. Reviewed plan of care. Patient has provided input and agrees with goals.

## 2023-01-12 ENCOUNTER — TELEPHONE (OUTPATIENT)
Dept: INTERNAL MEDICINE CLINIC | Age: 48
End: 2023-01-12

## 2023-01-12 DIAGNOSIS — I10 PRIMARY HYPERTENSION: Primary | ICD-10-CM

## 2023-01-12 DIAGNOSIS — R73.03 PREDIABETES: ICD-10-CM

## 2023-01-13 ENCOUNTER — APPOINTMENT (OUTPATIENT)
Dept: INTERNAL MEDICINE CLINIC | Age: 48
End: 2023-01-13

## 2023-01-14 LAB
A-G RATIO,AGRAT: 1.6 RATIO (ref 1.1–2.6)
ALBUMIN SERPL-MCNC: 4.5 G/DL (ref 3.5–5)
ALP SERPL-CCNC: 52 U/L (ref 25–115)
ALT SERPL-CCNC: 27 U/L (ref 5–40)
ANION GAP SERPL CALC-SCNC: 13 MMOL/L (ref 3–15)
AST SERPL W P-5'-P-CCNC: 19 U/L (ref 10–37)
BILIRUB SERPL-MCNC: 0.5 MG/DL (ref 0.2–1.2)
BUN SERPL-MCNC: 13 MG/DL (ref 6–22)
CALCIUM SERPL-MCNC: 9.4 MG/DL (ref 8.4–10.5)
CHLORIDE SERPL-SCNC: 104 MMOL/L (ref 98–110)
CHOLEST SERPL-MCNC: 177 MG/DL (ref 110–200)
CO2 SERPL-SCNC: 24 MMOL/L (ref 20–32)
CREAT SERPL-MCNC: 0.9 MG/DL (ref 0.5–1.2)
GLOBULIN,GLOB: 2.8 G/DL (ref 2–4)
GLOMERULAR FILTRATION RATE: >60 ML/MIN/1.73 SQ.M.
GLUCOSE SERPL-MCNC: 78 MG/DL (ref 70–99)
HDLC SERPL-MCNC: 4 MG/DL (ref 0–5)
HDLC SERPL-MCNC: 44 MG/DL
LDL/HDL RATIO,LDHD: 2.5
LDLC SERPL CALC-MCNC: 108 MG/DL (ref 50–99)
NON-HDL CHOLESTEROL, 011976: 133 MG/DL
POTASSIUM SERPL-SCNC: 4.7 MMOL/L (ref 3.5–5.5)
PROT SERPL-MCNC: 7.3 G/DL (ref 6.4–8.3)
SODIUM SERPL-SCNC: 141 MMOL/L (ref 133–145)
TRIGL SERPL-MCNC: 127 MG/DL (ref 40–149)
VLDLC SERPL CALC-MCNC: 25 MG/DL (ref 8–30)

## 2023-01-16 ENCOUNTER — TELEPHONE (OUTPATIENT)
Dept: INTERNAL MEDICINE CLINIC | Age: 48
End: 2023-01-16

## 2023-01-16 NOTE — TELEPHONE ENCOUNTER
----- Message from Radha Willis MD sent at 1/16/2023  1:45 PM EST -----  Tell patient his/her blood work is all normal

## 2023-03-14 RX ORDER — DULAGLUTIDE 0.75 MG/.5ML
INJECTION, SOLUTION SUBCUTANEOUS
Qty: 2 ML | Refills: 5 | Status: SHIPPED | OUTPATIENT
Start: 2023-03-14

## 2023-03-22 RX ORDER — AMLODIPINE AND VALSARTAN 10; 320 MG/1; MG/1
TABLET ORAL
Qty: 90 TABLET | Refills: 1 | Status: SHIPPED | OUTPATIENT
Start: 2023-03-22

## 2023-07-08 DIAGNOSIS — I10 ESSENTIAL (PRIMARY) HYPERTENSION: ICD-10-CM

## 2023-07-10 ENCOUNTER — NURSE ONLY (OUTPATIENT)
Age: 48
End: 2023-07-10

## 2023-07-10 DIAGNOSIS — R73.03 PREDIABETES: ICD-10-CM

## 2023-07-10 DIAGNOSIS — E66.01 MORBID (SEVERE) OBESITY DUE TO EXCESS CALORIES (HCC): ICD-10-CM

## 2023-07-10 DIAGNOSIS — I10 ESSENTIAL (PRIMARY) HYPERTENSION: Primary | ICD-10-CM

## 2023-07-10 DIAGNOSIS — I10 ESSENTIAL (PRIMARY) HYPERTENSION: ICD-10-CM

## 2023-07-10 RX ORDER — SPIRONOLACTONE 25 MG/1
TABLET ORAL
Qty: 90 TABLET | Refills: 2 | Status: SHIPPED | OUTPATIENT
Start: 2023-07-10

## 2023-07-10 NOTE — TELEPHONE ENCOUNTER
Please refill if appropriate or refuse medication if not appropriate.     PCP: Saroj Clayton MD     Last appt: 1/9/23    Last refill: 10/4/2022      Future Appointments   Date Time Provider [address] [place] [address]   7/17/2023  8:20 AM Saroj Clayton MD [facility] AMB         Requested Prescriptions     Pending Prescriptions Disp Refills    spironolactone (ALDACTONE) 25 MG tablet [Pharmacy Med Name: SPIRONOLACTONE 25 MG TABLET] 90 tablet 2     Sig: TAKE 1 TABLET BY MOUTH EVERY DAY

## 2023-07-11 LAB
A/G RATIO: 1.7 RATIO (ref 1.1–2.6)
ALBUMIN SERPL-MCNC: 4.6 G/DL (ref 3.5–5)
ALP BLD-CCNC: 51 U/L (ref 25–115)
ALT SERPL-CCNC: 31 U/L (ref 5–40)
ANION GAP SERPL CALCULATED.3IONS-SCNC: 12 MMOL/L (ref 3–15)
AST SERPL-CCNC: 20 U/L (ref 10–37)
AVERAGE GLUCOSE: 109 MG/DL (ref 91–123)
BILIRUB SERPL-MCNC: 0.4 MG/DL (ref 0.2–1.2)
BUN BLDV-MCNC: 13 MG/DL (ref 6–22)
CALCIUM SERPL-MCNC: 9.6 MG/DL (ref 8.4–10.5)
CHLORIDE BLD-SCNC: 108 MMOL/L (ref 98–110)
CHOLESTEROL/HDL RATIO: 4.2 (ref 0–5)
CHOLESTEROL: 182 MG/DL (ref 110–200)
CO2: 22 MMOL/L (ref 20–32)
CREAT SERPL-MCNC: 1 MG/DL (ref 0.5–1.2)
GLOBULIN: 2.7 G/DL (ref 2–4)
GLOMERULAR FILTRATION RATE: >60 ML/MIN/1.73 SQ.M.
GLUCOSE: 87 MG/DL (ref 70–99)
HBA1C MFR BLD: 5.4 % (ref 4.8–5.6)
HDLC SERPL-MCNC: 43 MG/DL
LDL CHOLESTEROL CALCULATED: 116 MG/DL (ref 50–99)
LDL/HDL RATIO: 2.7
NON-HDL CHOLESTEROL: 139 MG/DL
POTASSIUM SERPL-SCNC: 4.6 MMOL/L (ref 3.5–5.5)
SODIUM BLD-SCNC: 142 MMOL/L (ref 133–145)
TOTAL PROTEIN: 7.3 G/DL (ref 6.4–8.3)
TRIGL SERPL-MCNC: 116 MG/DL (ref 40–149)
VLDLC SERPL CALC-MCNC: 23 MG/DL (ref 8–30)

## 2023-07-17 ENCOUNTER — OFFICE VISIT (OUTPATIENT)
Age: 48
End: 2023-07-17
Payer: COMMERCIAL

## 2023-07-17 VITALS
DIASTOLIC BLOOD PRESSURE: 89 MMHG | BODY MASS INDEX: 35.56 KG/M2 | SYSTOLIC BLOOD PRESSURE: 126 MMHG | TEMPERATURE: 97.8 F | RESPIRATION RATE: 18 BRPM | HEART RATE: 73 BPM | HEIGHT: 76 IN | WEIGHT: 292 LBS | OXYGEN SATURATION: 98 %

## 2023-07-17 DIAGNOSIS — I10 ESSENTIAL (PRIMARY) HYPERTENSION: Primary | ICD-10-CM

## 2023-07-17 DIAGNOSIS — E66.01 MORBID (SEVERE) OBESITY DUE TO EXCESS CALORIES (HCC): ICD-10-CM

## 2023-07-17 DIAGNOSIS — R73.03 PREDIABETES: ICD-10-CM

## 2023-07-17 DIAGNOSIS — Z12.5 PROSTATE CANCER SCREENING: ICD-10-CM

## 2023-07-17 PROCEDURE — 99214 OFFICE O/P EST MOD 30 MIN: CPT | Performed by: INTERNAL MEDICINE

## 2023-07-17 PROCEDURE — 3078F DIAST BP <80 MM HG: CPT | Performed by: INTERNAL MEDICINE

## 2023-07-17 PROCEDURE — 3074F SYST BP LT 130 MM HG: CPT | Performed by: INTERNAL MEDICINE

## 2023-07-17 RX ORDER — DULAGLUTIDE 0.75 MG/.5ML
INJECTION, SOLUTION SUBCUTANEOUS
Qty: 6 ML | Refills: 3 | Status: SHIPPED | OUTPATIENT
Start: 2023-07-17

## 2023-07-17 RX ORDER — AMLODIPINE AND VALSARTAN 10; 320 MG/1; MG/1
1 TABLET ORAL DAILY
Qty: 90 TABLET | Refills: 3 | Status: SHIPPED | OUTPATIENT
Start: 2023-07-17

## 2023-07-17 SDOH — ECONOMIC STABILITY: INCOME INSECURITY: HOW HARD IS IT FOR YOU TO PAY FOR THE VERY BASICS LIKE FOOD, HOUSING, MEDICAL CARE, AND HEATING?: NOT HARD AT ALL

## 2023-07-17 SDOH — ECONOMIC STABILITY: FOOD INSECURITY: WITHIN THE PAST 12 MONTHS, THE FOOD YOU BOUGHT JUST DIDN'T LAST AND YOU DIDN'T HAVE MONEY TO GET MORE.: NEVER TRUE

## 2023-07-17 SDOH — ECONOMIC STABILITY: HOUSING INSECURITY
IN THE LAST 12 MONTHS, WAS THERE A TIME WHEN YOU DID NOT HAVE A STEADY PLACE TO SLEEP OR SLEPT IN A SHELTER (INCLUDING NOW)?: NO

## 2023-07-17 SDOH — ECONOMIC STABILITY: FOOD INSECURITY: WITHIN THE PAST 12 MONTHS, YOU WORRIED THAT YOUR FOOD WOULD RUN OUT BEFORE YOU GOT MONEY TO BUY MORE.: NEVER TRUE

## 2023-07-20 RX ORDER — MELOXICAM 15 MG/1
TABLET ORAL
Qty: 90 TABLET | Refills: 1 | Status: SHIPPED | OUTPATIENT
Start: 2023-07-20

## 2023-07-22 NOTE — PROGRESS NOTES
Lynne Cricket presents today for   Chief Complaint   Patient presents with    Hypertension    Blood Sugar Problem     6 month follow up            1. \"Have you been to the ER, urgent care clinic since your last visit? Hospitalized since your last visit? \" no    2. \"Have you seen or consulted any other health care providers outside of the [address] [place] [address] since your last visit? \" no     3. For patients aged 43-73: Has the patient had a colonoscopy / FIT/ Cologuard? Yes - no Care Gap present      If the patient is female:    4. For patients aged 43-66: Has the patient had a mammogram within the past 2 years? NA - based on age or sex      11. For patients aged 21-65: Has the patient had a pap smear?  NA - based on age or sex
1/17/2024) for labs 1 week before. Reviewed plan of care. Patient has provided input and agrees with goals.

## 2023-10-23 ENCOUNTER — TELEPHONE (OUTPATIENT)
Age: 48
End: 2023-10-23

## 2023-10-23 NOTE — TELEPHONE ENCOUNTER
Patient called and states the pharmacy told him his prescription for Trulicity needs prior authorization. Please advise, thank you.

## 2023-10-26 ENCOUNTER — TELEPHONE (OUTPATIENT)
Age: 48
End: 2023-10-26

## 2023-10-26 DIAGNOSIS — E66.01 CLASS 2 SEVERE OBESITY DUE TO EXCESS CALORIES WITH SERIOUS COMORBIDITY AND BODY MASS INDEX (BMI) OF 35.0 TO 35.9 IN ADULT (HCC): Primary | ICD-10-CM

## 2023-10-26 RX ORDER — PHENTERMINE HYDROCHLORIDE 37.5 MG/1
37.5 CAPSULE ORAL EVERY MORNING
Qty: 30 CAPSULE | Refills: 0 | Status: SHIPPED | OUTPATIENT
Start: 2023-10-26 | End: 2023-11-25

## 2023-10-26 NOTE — TELEPHONE ENCOUNTER
[employer] fax received denying Trulicity medication. The plan only covers patients who have the diagnosis of type 2 diabetes. Sending to Dr. Lane Lam for recommendation of new medication if needed.

## 2023-10-27 NOTE — TELEPHONE ENCOUNTER
Called and spoke to patient, patient states he received an email from [employer] that it was not going to be covered. Recommended that he maybe try [facility] to see what the price might be through them. Patient voiced understanding and no additional questions at this time.

## 2024-01-11 ENCOUNTER — NURSE ONLY (OUTPATIENT)
Age: 49
End: 2024-01-11

## 2024-01-11 DIAGNOSIS — R73.03 PREDIABETES: ICD-10-CM

## 2024-01-11 DIAGNOSIS — Z12.5 PROSTATE CANCER SCREENING: ICD-10-CM

## 2024-01-11 DIAGNOSIS — I10 ESSENTIAL (PRIMARY) HYPERTENSION: ICD-10-CM

## 2024-01-11 RX ORDER — MELOXICAM 15 MG/1
TABLET ORAL
Qty: 90 TABLET | Refills: 1 | Status: SHIPPED | OUTPATIENT
Start: 2024-01-11

## 2024-01-12 LAB
A/G RATIO: 1.5 RATIO (ref 1.1–2.6)
ALBUMIN SERPL-MCNC: 4.4 G/DL (ref 3.5–5)
ALP BLD-CCNC: 53 U/L (ref 25–115)
ALT SERPL-CCNC: 43 U/L (ref 5–40)
ANION GAP SERPL CALCULATED.3IONS-SCNC: 11 MMOL/L (ref 3–15)
AST SERPL-CCNC: 25 U/L (ref 10–37)
AVERAGE GLUCOSE: 122 MG/DL (ref 91–123)
BILIRUB SERPL-MCNC: 0.4 MG/DL (ref 0.2–1.2)
BUN BLDV-MCNC: 13 MG/DL (ref 6–22)
CALCIUM SERPL-MCNC: 9.8 MG/DL (ref 8.4–10.5)
CHLORIDE BLD-SCNC: 103 MMOL/L (ref 98–110)
CHOLESTEROL/HDL RATIO: 4.8 (ref 0–5)
CHOLESTEROL: 167 MG/DL (ref 110–200)
CO2: 26 MMOL/L (ref 20–32)
CREAT SERPL-MCNC: 0.9 MG/DL (ref 0.5–1.2)
CREATININE URINE: 294 MG/DL
GLOBULIN: 3 G/DL (ref 2–4)
GLOMERULAR FILTRATION RATE: >60 ML/MIN/1.73 SQ.M.
GLUCOSE: 92 MG/DL (ref 70–99)
HBA1C MFR BLD: 5.9 % (ref 4.8–5.6)
HDLC SERPL-MCNC: 35 MG/DL
LDL CHOLESTEROL CALCULATED: 107 MG/DL (ref 50–99)
LDL/HDL RATIO: 3.1
MICROALB/CREAT RATIO (UG/MG CREAT.): 9.1 (ref 0–30)
MICROALBUMIN/CREAT 24H UR: 26.7 MG/L (ref 0.1–17)
NON-HDL CHOLESTEROL: 132 MG/DL
POTASSIUM SERPL-SCNC: 4.6 MMOL/L (ref 3.5–5.5)
PROSTATE SPECIFIC ANTIGEN: 0.63 NG/ML
SODIUM BLD-SCNC: 140 MMOL/L (ref 133–145)
TOTAL PROTEIN: 7.4 G/DL (ref 6.4–8.3)
TRIGL SERPL-MCNC: 125 MG/DL (ref 40–149)
VLDLC SERPL CALC-MCNC: 25 MG/DL (ref 8–30)

## 2024-01-22 ENCOUNTER — OFFICE VISIT (OUTPATIENT)
Age: 49
End: 2024-01-22
Payer: COMMERCIAL

## 2024-01-22 VITALS
BODY MASS INDEX: 36.17 KG/M2 | TEMPERATURE: 97.4 F | OXYGEN SATURATION: 96 % | SYSTOLIC BLOOD PRESSURE: 144 MMHG | DIASTOLIC BLOOD PRESSURE: 90 MMHG | HEART RATE: 95 BPM | HEIGHT: 76 IN | RESPIRATION RATE: 20 BRPM | WEIGHT: 297 LBS

## 2024-01-22 DIAGNOSIS — E78.5 DYSLIPIDEMIA: ICD-10-CM

## 2024-01-22 DIAGNOSIS — I10 ESSENTIAL (PRIMARY) HYPERTENSION: Primary | ICD-10-CM

## 2024-01-22 DIAGNOSIS — E66.01 CLASS 2 SEVERE OBESITY DUE TO EXCESS CALORIES WITH SERIOUS COMORBIDITY AND BODY MASS INDEX (BMI) OF 36.0 TO 36.9 IN ADULT (HCC): ICD-10-CM

## 2024-01-22 DIAGNOSIS — R73.03 PREDIABETES: ICD-10-CM

## 2024-01-22 PROCEDURE — 3077F SYST BP >= 140 MM HG: CPT | Performed by: INTERNAL MEDICINE

## 2024-01-22 PROCEDURE — 3080F DIAST BP >= 90 MM HG: CPT | Performed by: INTERNAL MEDICINE

## 2024-01-22 PROCEDURE — 99214 OFFICE O/P EST MOD 30 MIN: CPT | Performed by: INTERNAL MEDICINE

## 2024-01-22 RX ORDER — DULAGLUTIDE 0.75 MG/.5ML
INJECTION, SOLUTION SUBCUTANEOUS
Qty: 6 ML | Refills: 3 | Status: SHIPPED | OUTPATIENT
Start: 2024-01-22

## 2024-01-22 ASSESSMENT — PATIENT HEALTH QUESTIONNAIRE - PHQ9
1. LITTLE INTEREST OR PLEASURE IN DOING THINGS: 0
SUM OF ALL RESPONSES TO PHQ9 QUESTIONS 1 & 2: 0
SUM OF ALL RESPONSES TO PHQ QUESTIONS 1-9: 0
2. FEELING DOWN, DEPRESSED OR HOPELESS: 0
SUM OF ALL RESPONSES TO PHQ QUESTIONS 1-9: 0

## 2024-01-22 NOTE — PROGRESS NOTES
Abisai Portillo is a 48 y.o.  male and presents with Hypertension, Blood sugar problem (Follow up), and Obesity      SUBJECTIVE:    Cardiovascular Review:  The patient has hypertension and obesity.  Diet and Lifestyle: generally follows a low fat low cholesterol diet, exercises regularly  Home BP Monitoring: is better controlled at home.  Pt says he is compliant with taking his BP meds  Exforge and Aldactone..  Emphasized the patient that the key to improving his blood pressure is losing about 20 pounds.  Pertinent ROS: taking medications as instructed, no medication side effects noted, no TIA's, no chest pain on exertion, no dyspnea on exertion, no swelling of ankles.     Patient has prediabetes that was being treated with Trulicity 0.75 mg weekly.  His insurance however does not seem to be covering the Trulicity.  Will try another prescription and if they do not cover it we will consider phentermine.  Patient also will be referred to bariatric surgery for an evaluation    Patient has sleep apnea and is doing well on CPAP.    Patient has some mild dyslipidemia with low HDL and borderline LDL. The 10-year ASCVD risk score ([place] DK, et al., 2019) is: 19% he will consider doing a heart scan to see if he needs to start on a statin.        Respiratory ROS: negative for - shortness of breath  Cardiovascular ROS: negative for - chest pain    Current Outpatient Medications   Medication Sig    Dulaglutide (TRULICITY) 0.75 MG/0.5ML SOPN 0.5 ML BY SUBCUTANEOUS ROUTE EVERY SEVEN (7) DAYS.    meloxicam (MOBIC) 15 MG tablet TAKE 1 TABLET BY MOUTH EVERY DAY    amLODIPine-valsartan (EXFORGE)  MG per tablet Take 1 tablet by mouth daily    spironolactone (ALDACTONE) 25 MG tablet TAKE 1 TABLET BY MOUTH EVERY DAY    acetaminophen (TYLENOL) 500 MG tablet Take 2 tablets by mouth every 6 hours as needed    ketoconazole (NIZORAL) 2 % shampoo Apply quarter size daily as needed    triamcinolone (KENALOG) 0.1 % cream use thin layer

## 2024-01-22 NOTE — PROGRESS NOTES
Abisai ALEJANDRA BorreroPortillo presents today for   Chief Complaint   Patient presents with    Hypertension    Blood Sugar Problem     Follow up        1. \"Have you been to the ER, urgent care clinic since your last visit?  Hospitalized since your last visit?\" no    2. \"Have you seen or consulted any other health care providers outside of the [facility] since your last visit?\" no     3. For patients aged 45-75: Has the patient had a colonoscopy / FIT/ Cologuard? Yes - no Care Gap present      If the patient is female:    4. For patients aged 40-74: Has the patient had a mammogram within the past 2 years? NA - based on age or sex      5. For patients aged 21-65: Has the patient had a pap smear? NA - based on age or sex

## 2024-01-25 ENCOUNTER — TELEPHONE (OUTPATIENT)
Age: 49
End: 2024-01-25

## 2024-01-25 DIAGNOSIS — E66.01 CLASS 2 SEVERE OBESITY DUE TO EXCESS CALORIES WITH SERIOUS COMORBIDITY AND BODY MASS INDEX (BMI) OF 36.0 TO 36.9 IN ADULT (HCC): Primary | ICD-10-CM

## 2024-01-25 RX ORDER — PHENTERMINE HYDROCHLORIDE 37.5 MG/1
37.5 TABLET ORAL
Qty: 30 TABLET | Refills: 0 | Status: SHIPPED | OUTPATIENT
Start: 2024-01-25 | End: 2024-02-24

## 2024-01-25 NOTE — TELEPHONE ENCOUNTER
Patient is aware Trulicity denied because patient does not have a diagnosis of DM type 2.    Patient states Dr. Madden was going to call in Phentermine if Trulicity was denied.     Please advise.

## 2024-04-11 DIAGNOSIS — I10 ESSENTIAL (PRIMARY) HYPERTENSION: ICD-10-CM

## 2024-04-11 RX ORDER — SPIRONOLACTONE 25 MG/1
TABLET ORAL
Qty: 90 TABLET | Refills: 2 | Status: SHIPPED | OUTPATIENT
Start: 2024-04-11

## 2024-06-29 RX ORDER — AMLODIPINE AND VALSARTAN 10; 320 MG/1; MG/1
1 TABLET ORAL DAILY
Qty: 90 TABLET | Refills: 3 | Status: SHIPPED | OUTPATIENT
Start: 2024-06-29

## 2024-07-16 ENCOUNTER — LAB (OUTPATIENT)
Facility: CLINIC | Age: 49
End: 2024-07-16

## 2024-07-16 DIAGNOSIS — R73.03 PREDIABETES: ICD-10-CM

## 2024-07-16 DIAGNOSIS — I10 ESSENTIAL (PRIMARY) HYPERTENSION: ICD-10-CM

## 2024-07-16 DIAGNOSIS — E78.5 DYSLIPIDEMIA: ICD-10-CM

## 2024-07-18 LAB
A/G RATIO: 1.8 RATIO (ref 1.1–2.6)
ALBUMIN: 4.7 G/DL (ref 3.5–5)
ALP BLD-CCNC: 67 U/L (ref 25–115)
ALT SERPL-CCNC: 32 U/L (ref 5–40)
ANION GAP SERPL CALCULATED.3IONS-SCNC: 10 MMOL/L (ref 3–15)
AST SERPL-CCNC: 20 U/L (ref 10–37)
BILIRUB SERPL-MCNC: 0.5 MG/DL (ref 0.2–1.2)
BUN BLDV-MCNC: 17 MG/DL (ref 6–22)
CALCIUM SERPL-MCNC: 9.9 MG/DL (ref 8.4–10.5)
CHLORIDE BLD-SCNC: 105 MMOL/L (ref 98–110)
CHOLESTEROL, TOTAL: 183 MG/DL (ref 110–200)
CHOLESTEROL/HDL RATIO: 4 (ref 0–5)
CO2: 24 MMOL/L (ref 20–32)
CREAT SERPL-MCNC: 1 MG/DL (ref 0.5–1.2)
ESTIMATED AVERAGE GLUCOSE: 116 MG/DL (ref 91–123)
GFR, ESTIMATED: >60 ML/MIN/1.73 SQ.M.
GLOBULIN: 2.6 G/DL (ref 2–4)
GLUCOSE: 91 MG/DL (ref 70–99)
HBA1C MFR BLD: 5.7 % (ref 4.8–5.6)
HDLC SERPL-MCNC: 46 MG/DL
LDL CHOLESTEROL: 101 MG/DL (ref 50–99)
LDL/HDL RATIO: 2.2
NON-HDL CHOLESTEROL: 137 MG/DL
POTASSIUM SERPL-SCNC: 4.3 MMOL/L (ref 3.5–5.5)
SODIUM BLD-SCNC: 139 MMOL/L (ref 133–145)
TOTAL PROTEIN: 7.3 G/DL (ref 6.4–8.3)
TRIGL SERPL-MCNC: 176 MG/DL (ref 40–149)
VLDLC SERPL CALC-MCNC: 35 MG/DL (ref 8–30)

## 2024-07-22 ENCOUNTER — OFFICE VISIT (OUTPATIENT)
Facility: CLINIC | Age: 49
End: 2024-07-22
Payer: COMMERCIAL

## 2024-07-22 VITALS
RESPIRATION RATE: 20 BRPM | BODY MASS INDEX: 37.02 KG/M2 | HEIGHT: 76 IN | DIASTOLIC BLOOD PRESSURE: 88 MMHG | SYSTOLIC BLOOD PRESSURE: 135 MMHG | HEART RATE: 97 BPM | TEMPERATURE: 97.4 F | WEIGHT: 304 LBS | OXYGEN SATURATION: 97 %

## 2024-07-22 DIAGNOSIS — Z12.5 PROSTATE CANCER SCREENING: ICD-10-CM

## 2024-07-22 DIAGNOSIS — I10 ESSENTIAL (PRIMARY) HYPERTENSION: Primary | ICD-10-CM

## 2024-07-22 DIAGNOSIS — E78.5 DYSLIPIDEMIA: ICD-10-CM

## 2024-07-22 DIAGNOSIS — E66.01 CLASS 2 SEVERE OBESITY DUE TO EXCESS CALORIES WITH SERIOUS COMORBIDITY AND BODY MASS INDEX (BMI) OF 36.0 TO 36.9 IN ADULT (HCC): ICD-10-CM

## 2024-07-22 DIAGNOSIS — R73.03 PREDIABETES: ICD-10-CM

## 2024-07-22 PROCEDURE — 3079F DIAST BP 80-89 MM HG: CPT | Performed by: INTERNAL MEDICINE

## 2024-07-22 PROCEDURE — 99214 OFFICE O/P EST MOD 30 MIN: CPT | Performed by: INTERNAL MEDICINE

## 2024-07-22 PROCEDURE — 3075F SYST BP GE 130 - 139MM HG: CPT | Performed by: INTERNAL MEDICINE

## 2024-07-22 RX ORDER — SPIRONOLACTONE 25 MG/1
25 TABLET ORAL DAILY
Qty: 90 TABLET | Refills: 2 | Status: SHIPPED | OUTPATIENT
Start: 2024-07-22

## 2024-07-22 RX ORDER — DULAGLUTIDE 0.75 MG/.5ML
INJECTION, SOLUTION SUBCUTANEOUS
Qty: 6 ML | Refills: 3 | Status: CANCELLED | OUTPATIENT
Start: 2024-07-22

## 2024-07-22 RX ORDER — NAPROXEN 500 MG/1
500 TABLET ORAL 2 TIMES DAILY
COMMUNITY
Start: 2024-06-06

## 2024-07-22 NOTE — PROGRESS NOTES
Abisai Portillo is a 49 y.o.  male and presents with Hypertension, Blood sugar problem (Follow up), and Obesity      SUBJECTIVE:    Cardiovascular Review:  The patient has hypertension and obesity.  Diet and Lifestyle: generally follows a low fat low cholesterol diet, exercises regularly  Home BP Monitoring: is better controlled at home.  Pt says he is compliant with taking his BP meds  Exforge and Aldactone..  Emphasized the patient that the key to improving his blood pressure is losing about 30 pounds.  Pertinent ROS: taking medications as instructed, no medication side effects noted, no TIA's, no chest pain on exertion, no dyspnea on exertion, no swelling of ankles.     Patient has prediabetes that is is trying to control with weight loss.  Patient continues to struggle with his weight and is a good candidate for medical weight loss program and was given information.      Patient has sleep apnea and is doing well on CPAP.    Patient has some mild dyslipidemia with low HDL and borderline LDL. The 10-year ASCVD risk score ([place] SCHAEFFER, et al., 2019) is: 16.9% he will consider doing a heart scan to see if he needs to start on a statin.        Respiratory ROS: negative for - shortness of breath  Cardiovascular ROS: negative for - chest pain    Current Outpatient Medications   Medication Sig    naproxen (NAPROSYN) 500 MG tablet Take 1 tablet by mouth 2 times daily    spironolactone (ALDACTONE) 25 MG tablet Take 1 tablet by mouth daily    amLODIPine-valsartan (EXFORGE)  MG per tablet TAKE 1 TABLET BY MOUTH EVERY DAY    acetaminophen (TYLENOL) 500 MG tablet Take 2 tablets by mouth every 6 hours as needed    ketoconazole (NIZORAL) 2 % shampoo Apply quarter size daily as needed    triamcinolone (KENALOG) 0.1 % cream use thin layer    meloxicam (MOBIC) 15 MG tablet TAKE 1 TABLET BY MOUTH EVERY DAY (Patient not taking: Reported on 7/22/2024)     No current facility-administered medications for this visit.

## 2024-07-22 NOTE — PROGRESS NOTES
Abisai Portillo presents today for   Chief Complaint   Patient presents with    Hypertension    Cholesterol Problem    Blood Sugar Problem     6 month follow up            \"Have you been to the ER, urgent care clinic since your last visit?  Hospitalized since your last visit?\"    NO    “Have you seen or consulted any other health care providers outside of [facility] since your last visit?”    Yes, [facility] ortho.

## 2024-09-20 RX ORDER — MELOXICAM 15 MG/1
TABLET ORAL
Qty: 90 TABLET | Refills: 1 | Status: SHIPPED | OUTPATIENT
Start: 2024-09-20

## 2024-10-03 ENCOUNTER — TELEPHONE (OUTPATIENT)
Facility: CLINIC | Age: 49
End: 2024-10-03

## 2024-10-03 DIAGNOSIS — Z92.89 HISTORY OF POSITIVE PPD: Primary | ICD-10-CM

## 2024-10-03 NOTE — TELEPHONE ENCOUNTER
----- Message from Kristopher PRATER sent at 10/3/2024 12:36 PM EDT -----  Regarding: [facility] Referral Request  [facility] Referral Request    Reason for referral request: Lab/Test Order    Specialist/Lab/Test patient is requesting (if known): Xray    Specialist Phone Number (if applicable):    Additional Information : Patient needs an order to have his xray done.   --------------------------------------------------------------------------------------------------------------------------    Relationship to Patient: Self     Call Back Information: OK to leave message on voicemail  Preferred Call Back Number: Phone : [phone]

## 2024-10-04 ENCOUNTER — HOSPITAL ENCOUNTER (OUTPATIENT)
Facility: HOSPITAL | Age: 49
Discharge: HOME | End: 2024-10-07
Payer: COMMERCIAL

## 2024-10-04 DIAGNOSIS — Z92.89 HISTORY OF POSITIVE PPD: ICD-10-CM

## 2024-10-04 PROCEDURE — 71046 X-RAY EXAM CHEST 2 VIEWS: CPT

## 2024-10-07 ENCOUNTER — TELEPHONE (OUTPATIENT)
Facility: CLINIC | Age: 49
End: 2024-10-07

## 2024-10-07 NOTE — TELEPHONE ENCOUNTER
Pt called back in given message. Pt would like a copy of xray. He can come into the office tomorrow morning.

## 2024-10-07 NOTE — TELEPHONE ENCOUNTER
----- Message from Dr. Rhys Madden MD sent at 10/4/2024  3:18 PM EDT -----  Please notify patient that their CXR is normal and he can get copy if needed for his job

## 2024-10-22 DIAGNOSIS — R73.03 PREDIABETES: ICD-10-CM

## 2024-10-22 DIAGNOSIS — I10 ESSENTIAL (PRIMARY) HYPERTENSION: ICD-10-CM

## 2024-10-22 DIAGNOSIS — Z12.5 PROSTATE CANCER SCREENING: ICD-10-CM

## 2024-10-22 DIAGNOSIS — E78.5 DYSLIPIDEMIA: ICD-10-CM

## 2025-01-13 ENCOUNTER — HOSPITAL ENCOUNTER (OUTPATIENT)
Facility: HOSPITAL | Age: 50
Setting detail: SPECIMEN
Discharge: HOME | End: 2025-01-16

## 2025-01-13 LAB — SENTARA SPECIMEN COLLECTION: NORMAL

## 2025-01-13 PROCEDURE — 99001 SPECIMEN HANDLING PT-LAB: CPT

## 2025-01-14 LAB
A/G RATIO: 1.5 RATIO (ref 1.1–2.6)
ALBUMIN: 4.3 G/DL (ref 3.5–5)
ALP BLD-CCNC: 57 U/L (ref 25–115)
ALT SERPL-CCNC: 24 U/L (ref 5–40)
ANION GAP SERPL CALCULATED.3IONS-SCNC: 15 MMOL/L (ref 3–15)
AST SERPL-CCNC: 20 U/L (ref 10–37)
BILIRUB SERPL-MCNC: 0.5 MG/DL (ref 0.2–1.2)
BUN BLDV-MCNC: 15 MG/DL (ref 6–22)
CALCIUM SERPL-MCNC: 9.6 MG/DL (ref 8.4–10.5)
CHLORIDE BLD-SCNC: 102 MMOL/L (ref 98–110)
CHOLESTEROL, TOTAL: 199 MG/DL (ref 110–200)
CHOLESTEROL/HDL RATIO: 4.1 (ref 0–5)
CO2: 22 MMOL/L (ref 20–32)
CREAT SERPL-MCNC: 1 MG/DL (ref 0.5–1.2)
ESTIMATED AVERAGE GLUCOSE: 117 MG/DL (ref 91–123)
GFR, ESTIMATED: >60 ML/MIN/1.73 SQ.M.
GLOBULIN: 2.8 G/DL (ref 2–4)
GLUCOSE: 87 MG/DL (ref 70–99)
HBA1C MFR BLD: 5.7 % (ref 4.8–5.6)
HDLC SERPL-MCNC: 48 MG/DL
LDL CHOLESTEROL: 124 MG/DL (ref 50–99)
LDL/HDL RATIO: 2.6
NON-HDL CHOLESTEROL: 151 MG/DL
POTASSIUM SERPL-SCNC: 4.5 MMOL/L (ref 3.5–5.5)
PROSTATE SPECIFIC ANTIGEN: 0.57 NG/ML
SODIUM BLD-SCNC: 139 MMOL/L (ref 133–145)
TOTAL PROTEIN: 7.1 G/DL (ref 6.4–8.3)
TRIGL SERPL-MCNC: 131 MG/DL (ref 40–149)
VLDLC SERPL CALC-MCNC: 26 MG/DL (ref 8–30)

## 2025-01-23 ENCOUNTER — OFFICE VISIT (OUTPATIENT)
Facility: CLINIC | Age: 50
End: 2025-01-23
Payer: COMMERCIAL

## 2025-01-23 VITALS
RESPIRATION RATE: 20 BRPM | SYSTOLIC BLOOD PRESSURE: 148 MMHG | OXYGEN SATURATION: 97 % | DIASTOLIC BLOOD PRESSURE: 92 MMHG | TEMPERATURE: 97.5 F | HEART RATE: 79 BPM | WEIGHT: 301 LBS | HEIGHT: 76 IN | BODY MASS INDEX: 36.65 KG/M2

## 2025-01-23 DIAGNOSIS — R73.03 PREDIABETES: ICD-10-CM

## 2025-01-23 DIAGNOSIS — I10 ESSENTIAL (PRIMARY) HYPERTENSION: Primary | ICD-10-CM

## 2025-01-23 DIAGNOSIS — E66.812 CLASS 2 SEVERE OBESITY DUE TO EXCESS CALORIES WITH SERIOUS COMORBIDITY AND BODY MASS INDEX (BMI) OF 36.0 TO 36.9 IN ADULT: ICD-10-CM

## 2025-01-23 DIAGNOSIS — E66.01 CLASS 2 SEVERE OBESITY DUE TO EXCESS CALORIES WITH SERIOUS COMORBIDITY AND BODY MASS INDEX (BMI) OF 36.0 TO 36.9 IN ADULT: ICD-10-CM

## 2025-01-23 DIAGNOSIS — E78.5 DYSLIPIDEMIA: ICD-10-CM

## 2025-01-23 PROCEDURE — 3077F SYST BP >= 140 MM HG: CPT | Performed by: INTERNAL MEDICINE

## 2025-01-23 PROCEDURE — 99214 OFFICE O/P EST MOD 30 MIN: CPT | Performed by: INTERNAL MEDICINE

## 2025-01-23 PROCEDURE — 3080F DIAST BP >= 90 MM HG: CPT | Performed by: INTERNAL MEDICINE

## 2025-01-23 SDOH — ECONOMIC STABILITY: FOOD INSECURITY: WITHIN THE PAST 12 MONTHS, YOU WORRIED THAT YOUR FOOD WOULD RUN OUT BEFORE YOU GOT MONEY TO BUY MORE.: NEVER TRUE

## 2025-01-23 SDOH — ECONOMIC STABILITY: FOOD INSECURITY: WITHIN THE PAST 12 MONTHS, THE FOOD YOU BOUGHT JUST DIDN'T LAST AND YOU DIDN'T HAVE MONEY TO GET MORE.: NEVER TRUE

## 2025-01-23 ASSESSMENT — PATIENT HEALTH QUESTIONNAIRE - PHQ9
2. FEELING DOWN, DEPRESSED OR HOPELESS: NOT AT ALL
SUM OF ALL RESPONSES TO PHQ QUESTIONS 1-9: 0
1. LITTLE INTEREST OR PLEASURE IN DOING THINGS: NOT AT ALL
SUM OF ALL RESPONSES TO PHQ9 QUESTIONS 1 & 2: 0
SUM OF ALL RESPONSES TO PHQ QUESTIONS 1-9: 0

## 2025-01-23 NOTE — PROGRESS NOTES
Abisai Portillo is a 49 y.o.  male and presents with Hypertension, Blood sugar problem (Follow up), and Obesity      SUBJECTIVE:    Cardiovascular Review:  The patient has hypertension and obesity.  Diet and Lifestyle: generally follows a low fat low cholesterol diet, exercises regularly  Home BP Monitoring: is better controlled at home.  Pt says he is compliant with taking his BP meds  Exforge and Aldactone..   BP at home is usually 130/80 so he may have component of white coat HTN. He will continue to monitor his BP at home and bring in readings to his OV  Pertinent ROS: taking medications as instructed, no medication side effects noted, no TIA's, no chest pain on exertion, no dyspnea on exertion, no swelling of ankles.     Patient has prediabetes that is is trying to control with weight loss.  Patient continues to struggle with his weight and is a good candidate for medical weight loss program which he is in the process of starting.       Patient has sleep apnea and is doing well on CPAP.    Patient has some mild dyslipidemia with low HDL and borderline LDL. The 10-year ASCVD risk score (En DK, et al., 2019) is: 20% he will consider doing a heart scan to see if he needs to start on a statin.        Respiratory ROS: negative for - shortness of breath  Cardiovascular ROS: negative for - chest pain    Current Outpatient Medications   Medication Sig    meloxicam (MOBIC) 15 MG tablet TAKE 1 TABLET BY MOUTH EVERY DAY    naproxen (NAPROSYN) 500 MG tablet Take 1 tablet by mouth 2 times daily    spironolactone (ALDACTONE) 25 MG tablet Take 1 tablet by mouth daily    amLODIPine-valsartan (EXFORGE)  MG per tablet TAKE 1 TABLET BY MOUTH EVERY DAY    acetaminophen (TYLENOL) 500 MG tablet Take 2 tablets by mouth every 6 hours as needed    ketoconazole (NIZORAL) 2 % shampoo Apply quarter size daily as needed    triamcinolone (KENALOG) 0.1 % cream use thin layer     No current facility-administered medications for

## 2025-01-23 NOTE — PROGRESS NOTES
Abisai Portillo presents today for   Chief Complaint   Patient presents with    Hypertension    Cholesterol Problem    Blood Sugar Problem     6 month follow up            \"Have you been to the ER, urgent care clinic since your last visit?  Hospitalized since your last visit?\"    NO    “Have you seen or consulted any other health care providers outside of [facility] since your last visit?”    NO

## 2025-02-24 LAB — COLONOSCOPY, EXTERNAL: NORMAL

## 2025-07-07 RX ORDER — MELOXICAM 15 MG/1
15 TABLET ORAL DAILY
Qty: 90 TABLET | Refills: 1 | Status: SHIPPED | OUTPATIENT
Start: 2025-07-07

## 2025-07-21 ENCOUNTER — HOSPITAL ENCOUNTER (OUTPATIENT)
Facility: HOSPITAL | Age: 50
Setting detail: SPECIMEN
Discharge: HOME | End: 2025-07-24

## 2025-07-21 LAB
BASOPHILS # BLD: 1 % (ref 0–2)
BASOPHILS ABSOLUTE: 0 K/UL (ref 0–0.2)
EOSINOPHIL # BLD: 2 % (ref 0–6)
EOSINOPHILS ABSOLUTE: 0.1 K/UL (ref 0–0.5)
HCT VFR BLD CALC: 44 % (ref 39.3–51.6)
HEMOGLOBIN: 14.3 G/DL (ref 13.1–17.2)
LYMPHOCYTES # BLD: 47 % (ref 20–45)
LYMPHOCYTES ABSOLUTE: 1.6 K/UL (ref 1–4.8)
MCH RBC QN AUTO: 28 PG (ref 26–34)
MCHC RBC AUTO-ENTMCNC: 33 G/DL (ref 31–36)
MCV RBC AUTO: 86 FL (ref 80–95)
MONOCYTES ABSOLUTE: 0.4 K/UL (ref 0.1–1)
MONOCYTES: 13 % (ref 3–12)
NEUTROPHILS ABSOLUTE: 1.3 K/UL (ref 1.8–7.7)
NEUTROPHILS SEGMENTED: 38 % (ref 40–75)
PDW BLD-RTO: 13.7 % (ref 10–15.5)
PLATELET # BLD: 169 K/UL (ref 140–440)
PMV BLD AUTO: 11.9 FL (ref 9–13)
RBC # BLD: 5.1 M/UL (ref 3.8–5.8)
SENTARA SPECIMEN COLLECTION: NORMAL
WBC # BLD: 3.4 K/UL (ref 4–11)

## 2025-07-21 PROCEDURE — 99001 SPECIMEN HANDLING PT-LAB: CPT

## 2025-07-22 LAB
A/G RATIO: 1.6 RATIO (ref 1.1–2.6)
ALBUMIN: 4.4 G/DL (ref 3.5–5)
ALP BLD-CCNC: 61 U/L (ref 25–115)
ALT SERPL-CCNC: 32 U/L (ref 5–40)
ANION GAP SERPL CALCULATED.3IONS-SCNC: 13 MMOL/L (ref 3–15)
AST SERPL-CCNC: 21 U/L (ref 10–37)
BILIRUB SERPL-MCNC: 0.6 MG/DL (ref 0.2–1.2)
BUN BLDV-MCNC: 19 MG/DL (ref 6–22)
CALCIUM SERPL-MCNC: 9.5 MG/DL (ref 8.4–10.5)
CHLORIDE BLD-SCNC: 104 MMOL/L (ref 98–110)
CHOLESTEROL, TOTAL: 192 MG/DL (ref 110–200)
CHOLESTEROL/HDL RATIO: 4.7 (ref 0–5)
CO2: 22 MMOL/L (ref 20–32)
CREAT SERPL-MCNC: 1 MG/DL (ref 0.5–1.2)
CREATININE, URINE  MG/DL: 187 MG/DL
ESTIMATED AVERAGE GLUCOSE: 119 MG/DL (ref 91–123)
GFR, ESTIMATED: 85.9 ML/MIN/1.73 SQ.M.
GLOBULIN: 2.7 G/DL (ref 2–4)
GLUCOSE: 108 MG/DL (ref 70–99)
HBA1C MFR BLD: 5.8 % (ref 4.8–5.6)
HDLC SERPL-MCNC: 41 MG/DL
LDL CHOLESTEROL: 124 MG/DL (ref 50–99)
LDL/HDL RATIO: 3
MICROALBUMIN/CREAT 24H UR: <12 MG/L (ref 0.1–17)
MICROALBUMIN/CREAT UR-RTO: NORMAL
NON-HDL CHOLESTEROL: 151 MG/DL
POTASSIUM SERPL-SCNC: 4.5 MMOL/L (ref 3.5–5.5)
SODIUM BLD-SCNC: 139 MMOL/L (ref 133–145)
TOTAL PROTEIN: 7.1 G/DL (ref 6.4–8.3)
TRIGL SERPL-MCNC: 133 MG/DL (ref 40–149)
VLDLC SERPL CALC-MCNC: 27 MG/DL (ref 8–30)

## 2025-07-23 DIAGNOSIS — I10 ESSENTIAL (PRIMARY) HYPERTENSION: ICD-10-CM

## 2025-07-23 DIAGNOSIS — E78.5 DYSLIPIDEMIA: ICD-10-CM

## 2025-07-23 DIAGNOSIS — R73.03 PREDIABETES: ICD-10-CM

## 2025-07-29 ENCOUNTER — OFFICE VISIT (OUTPATIENT)
Facility: CLINIC | Age: 50
End: 2025-07-29
Payer: COMMERCIAL

## 2025-07-29 VITALS
DIASTOLIC BLOOD PRESSURE: 98 MMHG | WEIGHT: 302 LBS | TEMPERATURE: 98.6 F | OXYGEN SATURATION: 98 % | BODY MASS INDEX: 36.77 KG/M2 | SYSTOLIC BLOOD PRESSURE: 151 MMHG | HEIGHT: 76 IN | HEART RATE: 77 BPM | RESPIRATION RATE: 20 BRPM

## 2025-07-29 DIAGNOSIS — E78.5 DYSLIPIDEMIA: ICD-10-CM

## 2025-07-29 DIAGNOSIS — E66.01 CLASS 2 SEVERE OBESITY DUE TO EXCESS CALORIES WITH SERIOUS COMORBIDITY AND BODY MASS INDEX (BMI) OF 36.0 TO 36.9 IN ADULT (HCC): ICD-10-CM

## 2025-07-29 DIAGNOSIS — R73.03 PREDIABETES: ICD-10-CM

## 2025-07-29 DIAGNOSIS — I10 ESSENTIAL (PRIMARY) HYPERTENSION: Primary | ICD-10-CM

## 2025-07-29 DIAGNOSIS — E66.812 CLASS 2 SEVERE OBESITY DUE TO EXCESS CALORIES WITH SERIOUS COMORBIDITY AND BODY MASS INDEX (BMI) OF 36.0 TO 36.9 IN ADULT (HCC): ICD-10-CM

## 2025-07-29 DIAGNOSIS — L30.9 ECZEMA, UNSPECIFIED TYPE: ICD-10-CM

## 2025-07-29 PROCEDURE — 3080F DIAST BP >= 90 MM HG: CPT | Performed by: INTERNAL MEDICINE

## 2025-07-29 PROCEDURE — 3077F SYST BP >= 140 MM HG: CPT | Performed by: INTERNAL MEDICINE

## 2025-07-29 PROCEDURE — 99214 OFFICE O/P EST MOD 30 MIN: CPT | Performed by: INTERNAL MEDICINE

## 2025-07-29 RX ORDER — KETOCONAZOLE 20 MG/ML
SHAMPOO, SUSPENSION TOPICAL
Qty: 120 ML | Refills: 5 | Status: SHIPPED | OUTPATIENT
Start: 2025-07-29

## 2025-07-29 RX ORDER — TRIAMCINOLONE ACETONIDE 1 MG/G
CREAM TOPICAL
Qty: 45 G | Refills: 5 | Status: SHIPPED | OUTPATIENT
Start: 2025-07-29

## 2025-07-29 NOTE — PROGRESS NOTES
Abisai Portillo presents today for   Chief Complaint   Patient presents with    Hypertension    Cholesterol Problem     6 month follow up            \"Have you been to the ER, urgent care clinic since your last visit?  Hospitalized since your last visit?\"    NO    “Have you seen or consulted any other health care providers outside of [facility] since your last visit?”    NO

## 2025-07-29 NOTE — PROGRESS NOTES
Abisai Portillo is a 50 y.o.  male and presents with Hypertension, Blood sugar problem (Follow up), and Obesity      SUBJECTIVE:    Cardiovascular Review:  The patient has hypertension and obesity.  Diet and Lifestyle: generally follows a low fat low cholesterol diet, exercises regularly  Home BP Monitoring: is uncontrolled at home.  Pt says he is compliant with taking his BP meds  Exforge and Aldactone..   BP at home is usually 140's/90's so he may have component of white coat HTN. He will  work on weight loss with medical weight loss program if possible over the next 6 months and if unable to improve BP will need to adjust medications.  Pertinent ROS: taking medications as instructed, no medication side effects noted, no TIA's, no chest pain on exertion, no dyspnea on exertion, no swelling of ankles.     Patient has prediabetes that is is trying to control with weight loss.  Patient continues to struggle with his weight and is a good candidate for medical weight loss program which he was given the information for again.      Patient has sleep apnea and is doing well on CPAP.    Patient has some mild dyslipidemia with low HDL and borderline LDL. The 10-year ASCVD risk score ([place] DK, et al., 2019) is: 22.3% he will consider doing a heart scan  and hold off and starting a statin at this time.       Respiratory ROS: negative for - shortness of breath  Cardiovascular ROS: negative for - chest pain    Current Outpatient Medications   Medication Sig    triamcinolone (KENALOG) 0.1 % cream Apply topically 2 times daily.    ketoconazole (NIZORAL) 2 % shampoo Apply topically daily as needed.    meloxicam (MOBIC) 15 MG tablet TAKE 1 TABLET BY MOUTH EVERY DAY    naproxen (NAPROSYN) 500 MG tablet Take 1 tablet by mouth 2 times daily    spironolactone (ALDACTONE) 25 MG tablet Take 1 tablet by mouth daily    amLODIPine-valsartan (EXFORGE)  MG per tablet TAKE 1 TABLET BY MOUTH EVERY DAY    acetaminophen (TYLENOL) 500 MG

## 2025-07-31 ENCOUNTER — TELEPHONE (OUTPATIENT)
Facility: CLINIC | Age: 50
End: 2025-07-31

## 2025-07-31 NOTE — TELEPHONE ENCOUNTER
----- Message from Val BONILLA sent at 7/31/2025 11:58 AM EDT -----  Regarding: [facility] Referral Request  [facility] Referral Request    Reason for referral request: Specialty Provider    Specialist/Lab/Test patient is requesting (if known):    Specialist Phone Number (if applicable):    Additional Information Patient need a referral, and also need the bloodwork result  --------------------------------------------------------------------------------------------------------------------------    Relationship to Patient: Self     Call Back Information: OK to leave message on voicemail  Preferred Call Back Number: Phone [phone]

## 2025-07-31 NOTE — TELEPHONE ENCOUNTER
Patient would like results from blood work and would like lab results sent to [facility].

## 2025-08-07 DIAGNOSIS — I10 ESSENTIAL (PRIMARY) HYPERTENSION: ICD-10-CM

## 2025-08-07 RX ORDER — SPIRONOLACTONE 25 MG/1
25 TABLET ORAL DAILY
Qty: 90 TABLET | Refills: 3 | Status: SHIPPED | OUTPATIENT
Start: 2025-08-07

## (undated) DEVICE — NEEDLE SPNL 20GA L3.5IN YEL HUB S STL REG WALL FIT STYL W/

## (undated) DEVICE — BLADE SAW 1.27X13X90 MM FOR LG BNE

## (undated) DEVICE — BIPOLAR SEALER 23-301-1 AQM MBS: Brand: AQUAMANTYS™

## (undated) DEVICE — ZIP 16 SURGICAL SKIN CLOSURE DEVICE: Brand: ZIP 16 SURGICAL SKIN CLOSURE DEVICE

## (undated) DEVICE — GARMENT,MEDLINE,DVT,INT,CALF,MED, GEN2: Brand: MEDLINE

## (undated) DEVICE — BLADE ELECTRODE: Brand: EDGE

## (undated) DEVICE — SUT VCRL + 2-0 36IN CT1 UD --

## (undated) DEVICE — DRESSING ALG W4XL8IN AG FOAM SUPERABSORBENT SIL ANTIMIC

## (undated) DEVICE — ROCKER SWITCH PENCIL HOLSTER: Brand: VALLEYLAB

## (undated) DEVICE — SOL INJ L R 1000ML BG --

## (undated) DEVICE — SUT VCRL + 1 36IN CT1 VIO --

## (undated) DEVICE — PACK PROCEDURE SURG TOT HIP ANTR CARTER CUST

## (undated) DEVICE — SOL IRRIGATION INJ NACL 0.9% 500ML BTL

## (undated) DEVICE — HANDPIECE SET WITH HIGH FLOW TIP AND SUCTION TUBE: Brand: INTERPULSE